# Patient Record
Sex: FEMALE | Race: WHITE | NOT HISPANIC OR LATINO | Employment: UNEMPLOYED | URBAN - METROPOLITAN AREA
[De-identification: names, ages, dates, MRNs, and addresses within clinical notes are randomized per-mention and may not be internally consistent; named-entity substitution may affect disease eponyms.]

---

## 2019-02-04 ENCOUNTER — OFFICE VISIT (OUTPATIENT)
Dept: OBGYN | Facility: CLINIC | Age: 39
End: 2019-02-04
Payer: COMMERCIAL

## 2019-02-04 ENCOUNTER — ANCILLARY PROCEDURE (OUTPATIENT)
Dept: MAMMOGRAPHY | Facility: CLINIC | Age: 39
End: 2019-02-04
Payer: COMMERCIAL

## 2019-02-04 VITALS
WEIGHT: 293 LBS | BODY MASS INDEX: 47.09 KG/M2 | HEART RATE: 72 BPM | SYSTOLIC BLOOD PRESSURE: 112 MMHG | HEIGHT: 66 IN | DIASTOLIC BLOOD PRESSURE: 64 MMHG

## 2019-02-04 DIAGNOSIS — E66.01 MORBID OBESITY WITH BMI OF 50.0-59.9, ADULT (H): ICD-10-CM

## 2019-02-04 DIAGNOSIS — E03.9 ACQUIRED HYPOTHYROIDISM: ICD-10-CM

## 2019-02-04 DIAGNOSIS — Z01.419 ENCOUNTER FOR GYNECOLOGICAL EXAMINATION WITHOUT ABNORMAL FINDING: Primary | ICD-10-CM

## 2019-02-04 DIAGNOSIS — Z12.31 VISIT FOR SCREENING MAMMOGRAM: ICD-10-CM

## 2019-02-04 DIAGNOSIS — Z30.09 GENERAL COUNSELING AND ADVICE ON CONTRACEPTIVE MANAGEMENT: ICD-10-CM

## 2019-02-04 PROCEDURE — 87624 HPV HI-RISK TYP POOLED RSLT: CPT | Performed by: OBSTETRICS & GYNECOLOGY

## 2019-02-04 PROCEDURE — 77067 SCR MAMMO BI INCL CAD: CPT | Mod: TC

## 2019-02-04 PROCEDURE — G0145 SCR C/V CYTO,THINLAYER,RESCR: HCPCS | Performed by: OBSTETRICS & GYNECOLOGY

## 2019-02-04 PROCEDURE — 99213 OFFICE O/P EST LOW 20 MIN: CPT | Mod: 25 | Performed by: OBSTETRICS & GYNECOLOGY

## 2019-02-04 PROCEDURE — 99396 PREV VISIT EST AGE 40-64: CPT | Performed by: OBSTETRICS & GYNECOLOGY

## 2019-02-04 RX ORDER — PHENTERMINE HYDROCHLORIDE 30 MG/1
30 CAPSULE ORAL EVERY MORNING
Qty: 30 CAPSULE | Refills: 1 | Status: SHIPPED | OUTPATIENT
Start: 2019-02-04 | End: 2019-03-04

## 2019-02-04 ASSESSMENT — ANXIETY QUESTIONNAIRES
5. BEING SO RESTLESS THAT IT IS HARD TO SIT STILL: NOT AT ALL
1. FEELING NERVOUS, ANXIOUS, OR ON EDGE: NOT AT ALL
7. FEELING AFRAID AS IF SOMETHING AWFUL MIGHT HAPPEN: NOT AT ALL
6. BECOMING EASILY ANNOYED OR IRRITABLE: NOT AT ALL
IF YOU CHECKED OFF ANY PROBLEMS ON THIS QUESTIONNAIRE, HOW DIFFICULT HAVE THESE PROBLEMS MADE IT FOR YOU TO DO YOUR WORK, TAKE CARE OF THINGS AT HOME, OR GET ALONG WITH OTHER PEOPLE: NOT DIFFICULT AT ALL
2. NOT BEING ABLE TO STOP OR CONTROL WORRYING: NOT AT ALL
GAD7 TOTAL SCORE: 0
3. WORRYING TOO MUCH ABOUT DIFFERENT THINGS: NOT AT ALL

## 2019-02-04 ASSESSMENT — PATIENT HEALTH QUESTIONNAIRE - PHQ9
SUM OF ALL RESPONSES TO PHQ QUESTIONS 1-9: 2
5. POOR APPETITE OR OVEREATING: NOT AT ALL

## 2019-02-04 ASSESSMENT — MIFFLIN-ST. JEOR: SCORE: 2298.15

## 2019-02-04 NOTE — PROGRESS NOTES
Yodit is a 38 year old  female who presents for annual exam.     Besides routine health maintenance,  she would like to discuss weight loss management and nipple sensitivity. Also interested in learning different BC options.     HPI:  The patient's PCP is [facility].    Patient hasn't been seen for about 3 yrs. Does have a PCP and has seen them and had labs done about 1-2 yrs ago that were normal. Continues to follow her thyroid through endo and that has been stable.    Patient hasn't contracepted other than condoms since having her kids. Would really like to finally make a decision on contraception and do that as would be horrified to get pregnant at her age. Patient thought her mom had cervical cancer(in notes states ovarian) but now has found out from her sisters that it was likely uterine. She  at age 48 and so Yodit was very young and didn't know the details. Patient's mom was definitely overweight from what she recalls but doesn't think it was that bad. Unsure of any other details though. Wonders about what options she would use that would either improve risk or could actually worsen it. Worries about ocps given her weight and risk of blood clots    Patient's weight has also gotten out of hand. She is actually down about 15# from last weight at a [facility] but BMI is 58. We've discussed bariatric surgery on many occasions but she feels that that isn't for her and really doesn't want to do that. States she doesn't eat much. Not a snacker, doesn't crave sweets. Definitely craves more salty snacks and bread/pasta/potatoes. Was doing a lot of soda but has actually cut that out.  is on phentermine since sept and has lost 120#. Working out all the time, cut out soda, not eating at gas stations on way home from work, etc. Already making healthier food choices b/c of his attempts. Wondering if maybe she could also try phentermine.      GYNECOLOGIC HISTORY:    Patient's last  menstrual period was 2019 (exact date).  Her current contraception method is: condoms.  She  reports that  has never smoked. she has never used smokeless tobacco.    Patient is sexually active.  STD testing offered?  Declined  Last PHQ-9 score on record =   PHQ-9 SCORE 2019   PHQ-9 Total Score 2     Last GAD7 score on record =   MELINA-7 SCORE 2019   Total Score 0     Alcohol Score = 1    HEALTH MAINTENANCE:  Cholesterol:  17   Total= 183, Triglycerides=122, HDL=45, CFL=273, TSH=2.97  Last Mammo: Never, Result: not applicable  Pap: 12 wnl, HPV-  Colonoscopy:  Never, Result: not applicable  Dexa:  Never    Health maintenance updated:  yes    HISTORY:  Obstetric History       T2      L2     SAB1   TAB0   Ectopic0   Multiple0   Live Births2       # Outcome Date GA Lbr Misael/2nd Weight Sex Delivery Anes PTL Lv   3 Term 11 39w0d  4.423 kg (9 lb 12 oz) F CS-Unspec EPI  PAULY      Name: Ebonie   2 Term 12/10/08 39w0d  3.6 kg (7 lb 15 oz) M CS-Unspec Spinal  PAULY      Name: Flaquito   1 SAB 06     SAB             Patient Active Problem List   Diagnosis     Menorrhagia with regular cycle     Morbid obesity, unspecified obesity type (H)     Family history of ovarian cancer     Hypothyroidism     Morbid obesity with BMI of 50.0-59.9, adult (H)     Past Surgical History:   Procedure Laterality Date      SECTION  12/10/08, 11     HC CREATE EARDRUM OPENING,GEN ANESTH       HC TOOTH EXTRACTION W/FORCEP        Social History     Tobacco Use     Smoking status: Never Smoker     Smokeless tobacco: Never Used   Substance Use Topics     Alcohol use: Yes     Alcohol/week: 0.0 oz      Problem (# of Occurrences) Relation (Name,Age of Onset)    Breast Cancer (1) Maternal Grandmother: 2013    Thyroid Disease (1) Sister    Uterine Cancer (1) Mother (48):  age 48            Current Outpatient Medications   Medication Sig     levothyroxine (SYNTHROID, LEVOTHROID) 125 MCG tablet  "Take 2 tablets (250 mcg) by mouth daily     phentermine (ADIPEX-P) 30 MG capsule Take 1 capsule (30 mg) by mouth every morning     No current facility-administered medications for this visit.      No Known Allergies    Past medical, surgical, social and family histories were reviewed and updated in EPIC.    ROS:   12 point review of systems negative other than symptoms noted below.  Breast: Tenderness  Genitourinary: Vaginal Discharge  Skin: Acne and Skin Dryness    EXAM:  /64   Pulse 72   Ht 1.664 m (5' 5.5\")   Wt (!) 161.9 kg (357 lb)   LMP 01/17/2019 (Exact Date)   BMI 58.50 kg/m     BMI: Body mass index is 58.5 kg/m .    PHYSICAL EXAM:  Constitutional:  Appearance: Well nourished, well developed, alert, in no acute distress  Neck:  Lymph Nodes:  No lymphadenopathy present    Thyroid:  Gland size normal, nontender, no nodules or masses present  on palpation  Chest:  Respiratory Effort:  Breathing unlabored  Cardiovascular:    Heart: Auscultation:  Regular rate, normal rhythm, no murmurs present  Breasts: Palpation of Breasts and Axillae:  No masses present on palpation, no breast tenderness. and No nodularity, asymmetry or nipple discharge bilaterally.  Gastrointestinal:   Abdominal Examination:  Abdomen nontender to palpation, tone normal without rigidity or guarding, no masses present, umbilicus without lesions   Liver and Spleen:  No hepatomegaly present, liver nontender to palpation    Hernias:  No hernias present  Lymphatic: Lymph Nodes:  No other lymphadenopathy present  Skin:  General Inspection:  No rashes present, no lesions present, no areas of  discoloration    Genitalia and Groin:  No rashes present, no lesions present, no areas of  discoloration, no masses present  Neurologic/Psychiatric:    Mental Status:  Oriented X3     Pelvic Exam:  External Genitalia:     Normal appearance for age, no discharge present, no tenderness present, no inflammatory lesions present, color normal  Vagina:  "    Normal vaginal vault without central or paravaginal defects, no discharge present, no inflammatory lesions present, no masses present  Bladder:     Nontender to palpation  Urethra:   Urethral Body:  Urethra palpation normal, urethra structural support normal   Urethral Meatus:  No erythema or lesions present  Cervix:     Appearance healthy, no lesions present, nontender to palpation, no bleeding present  Uterus:     Uterus: firm, normal sized and nontender, midplane in position.   Adnexa:     No adnexal tenderness present, no adnexal masses present  Perineum:     Perineum within normal limits, no evidence of trauma, no rashes or skin lesions present  Anus:     Anus within normal limits, no hemorrhoids present  Inguinal Lymph Nodes:     No lymphadenopathy present  Pubic Hair:     Normal pubic hair distribution for age  Genitalia and Groin:     No rashes present, no lesions present, no areas of discoloration, no masses present      COUNSELING:   Reviewed preventive health counseling, as reflected in patient instructions  Special attention given to:        Regular exercise       Healthy diet/nutrition       Contraception    BMI: Body mass index is 58.5 kg/m .  Weight management plan: Specific weight management program called bariatric surgery discussed but patient declines that. See below for alternative therapy plan    ASSESSMENT:  38 year old female with satisfactory annual exam.    ICD-10-CM    1. Encounter for gynecological examination without abnormal finding Z01.419 Pap imaged thin layer screen with HPV - recommended age 30 - 65     HPV High Risk Types DNA Cervical   2. Morbid obesity with BMI of 50.0-59.9, adult (H) E66.01 phentermine (ADIPEX-P) 30 MG capsule    Z68.43    3. General counseling and advice on contraceptive management Z30.09    4. Acquired hypothyroidism E03.9        PLAN:  Pap and cotesting done today  mammo today  Continue to follow thyroid with endo and her labs with PCP    Discussed  contraceptive options. ocps were a better choice if her mom had ovarian cancer but given her weight and sedentary lifestyle she is high risk for DVT on that. Could consider N.R but still some risk. Discussed that a mirena IUd could be a great option to decrease her flow, decrease risk of uterine cancer, and be a long term option that doesn't increase DVT risk. Patient does think she'd like to do that. Had both kids by C/S and cvx is fairly deep set so may need a long speculum but were able to see her cvx for pap today with a lot of pressure on the speculum.    Also spent a lot of time discussing her weight. Discussed her fears of bariatric surgery as legitimate but the risk to her health and life from her morbid obesity is much worse. Her risk of just uterine cancer like her mom is much higher give her BMI.  Patient isn't interested in pursuing surgery at this time though  Discussed phentermine, it's indications, and usage. Discussed its r/b/a/side effects.  Cautioned not to expect the same level of weight loss as her  as male and female weight loss is very different  Discussed that this can possibly be done long term in a lower dose it is technically meant for short term use so will start with planning about 6 months and reassess from there.  Will start on it and f/u with me in a month regarding weight loss, BP check and also do her IUD insert at same time.  Will have her do initially monthly f/u with me on the med and then could consider longer 2-3 month intervals.    Spent an additional 15 min on top of her annual addressing the morbid obesity, implications, and treatment options.    Lexi Salazar MD

## 2019-02-05 ASSESSMENT — ANXIETY QUESTIONNAIRES: GAD7 TOTAL SCORE: 0

## 2019-02-07 PROBLEM — E66.01 MORBID OBESITY WITH BMI OF 50.0-59.9, ADULT (H): Status: ACTIVE | Noted: 2019-02-07

## 2019-02-07 LAB
COPATH REPORT: NORMAL
PAP: NORMAL

## 2019-02-08 LAB
FINAL DIAGNOSIS: NORMAL
HPV HR 12 DNA CVX QL NAA+PROBE: NEGATIVE
HPV16 DNA SPEC QL NAA+PROBE: NEGATIVE
HPV18 DNA SPEC QL NAA+PROBE: NEGATIVE
SPECIMEN DESCRIPTION: NORMAL
SPECIMEN SOURCE CVX/VAG CYTO: NORMAL

## 2019-02-18 ENCOUNTER — OFFICE VISIT (OUTPATIENT)
Dept: OBGYN | Facility: CLINIC | Age: 39
End: 2019-02-18
Payer: COMMERCIAL

## 2019-02-18 ENCOUNTER — TRANSFERRED RECORDS (OUTPATIENT)
Dept: HEALTH INFORMATION MANAGEMENT | Facility: CLINIC | Age: 39
End: 2019-02-18

## 2019-02-18 VITALS
DIASTOLIC BLOOD PRESSURE: 68 MMHG | SYSTOLIC BLOOD PRESSURE: 118 MMHG | BODY MASS INDEX: 47.09 KG/M2 | HEIGHT: 66 IN | HEART RATE: 64 BPM | WEIGHT: 293 LBS

## 2019-02-18 DIAGNOSIS — Z30.431 IUD CHECK UP: ICD-10-CM

## 2019-02-18 DIAGNOSIS — Z97.5 PRESENCE OF INTRAUTERINE CONTRACEPTIVE DEVICE (IUD): ICD-10-CM

## 2019-02-18 DIAGNOSIS — Z30.430 ENCOUNTER FOR INSERTION OF INTRAUTERINE CONTRACEPTIVE DEVICE: Primary | ICD-10-CM

## 2019-02-18 LAB — BETA HCG QUAL IFA URINE: NEGATIVE

## 2019-02-18 PROCEDURE — 58300 INSERT INTRAUTERINE DEVICE: CPT | Performed by: OBSTETRICS & GYNECOLOGY

## 2019-02-18 PROCEDURE — 84703 CHORIONIC GONADOTROPIN ASSAY: CPT | Performed by: OBSTETRICS & GYNECOLOGY

## 2019-02-18 ASSESSMENT — MIFFLIN-ST. JEOR: SCORE: 2275.47

## 2019-02-18 NOTE — PROGRESS NOTES
"  IUD Insertion:  CONSULT:    Is a pregnancy test required: Yes.  Was it positive or negative?  Negative  Was a consent obtained?  Yes    Subjective: Yodit Harvey is a 38 year old  presents for IUD and desires Mirena type IUD.    Patient has been given the opportunity to ask questions about all forms of birth control, including all options appropriate for Yodit Harvey. Discussed that no method of birth control, except abstinence is 100% effective against pregnancy or sexually transmitted infection.     Yodit Harvey understands she may have the IUD removed at any time. IUD should be removed by a health care provider.    The entire insertion procedure was reviewed with the patient, including care after placement.    Patient's last menstrual period was 2019 (exact date). Has current contraception: condoms. No allergy to betadine or shellfish. Patient declines STD screening      /68   Pulse 64   Ht 1.664 m (5' 5.5\")   Wt (!) 159.7 kg (352 lb)   LMP 2019 (Exact Date)   BMI 57.68 kg/m      Pelvic Exam:   EG/BUS: normal genital architecture without lesions, erythema or abnormal secretions.   Vagina: moist, pink, rugae with physiologic discharge and secretions  Cervix: nulliparous appearing no lesions and pink, moist, closed, without lesion or CMT, very deep set so need the extra long graves but the graves was somewhat tight for the introitus  Uterus: midposition, mobile, no pain  Adnexa: within normal limits and no masses, nodularity, tenderness    PROCEDURE NOTE: -- IUD Insertion    Reason for Insertion: contraception    Patient didn't premedicate with ibuprofen. Says she will take some when she gets home.  Under sterile technique, cervix was visualized with speculum and prepped with Betadine solution swab x 3. Tenaculum was placed for stability. The uterus was gently straightened and attempt at sounding was made. The cervix allowed the sound to pass but b/c the speculum was " large for her introitus it was hard to direct the sound with the tenaculum there b/c of tight fit. Finally able to easily pass os finder clearly into the endometrial cavity. Tried to follow with sound but felt like possibly an initial false passage was being made. The os finder then again easily passed and the uterus sounded to 7.5 cm. IUD prepared for placement, and IUD inserted according to 's instructions without difficulty or significant resitance, and deployed at the fundus. The strings were visualized and trimmed to 3.0 cm from the external os. Tenaculum was removed and hemostasis noted. Speculum removed.  Patient tolerated procedure well. B/C there was some difficulty with the sound and wanted to confirm that the IUD was actually placed appropriately and centrally, patient was actually sent to [facility] for U/S to confirm. On U/S the IUD was seen to be appropriately positioned at the fundus.    Lot # [id number]  Exp: 06/2021  NDC: 39065-680-07    EBL: minimal    Complications: none    ASSESSMENT:     ICD-10-CM    1. Encounter for insertion of intrauterine contraceptive device Z30.430 levonorgestrel (MIRENA) 20 MCG/24HR IUD 20 mcg     INSERTION INTRAUTERINE DEVICE     Beta HCG Qual, Urine - [facility] and [place] ([id number])        PLAN:    Given 's handouts, including when to have IUD removed, list of danger s/sx, side effects and follow up recommended. Encouraged condom use for prevention of STD. Back up contraception advised for 7 days if progestin method. Advised to call for any fever, for prolonged or severe pain or bleeding, abnormal vaginal discharge, or unable to palpate strings. She was advised to use pain medications (ibuprofen) as needed for mild to moderate pain. Advised to follow-up in clinic in 4-6 weeks for IUD string check if unable to find strings or as directed by provider.     Patient has lost 5# since here 2 weeks ago and BP is great on phentermine. Has had a couple dizzy spells  with it but for the most part is tolerating it fine. Has a f/u in another 2-3 weeks for just that and can f/u on her IUD at that time also.    Lexi Salazar MD

## 2019-02-19 PROBLEM — Z97.5 PRESENCE OF INTRAUTERINE CONTRACEPTIVE DEVICE (IUD): Status: ACTIVE | Noted: 2019-02-18

## 2019-03-04 ENCOUNTER — OFFICE VISIT (OUTPATIENT)
Dept: OBGYN | Facility: CLINIC | Age: 39
End: 2019-03-04
Payer: COMMERCIAL

## 2019-03-04 VITALS
SYSTOLIC BLOOD PRESSURE: 112 MMHG | BODY MASS INDEX: 47.09 KG/M2 | HEART RATE: 70 BPM | DIASTOLIC BLOOD PRESSURE: 62 MMHG | HEIGHT: 66 IN | WEIGHT: 293 LBS

## 2019-03-04 DIAGNOSIS — E66.01 MORBID OBESITY WITH BMI OF 50.0-59.9, ADULT (H): Primary | ICD-10-CM

## 2019-03-04 PROCEDURE — 99212 OFFICE O/P EST SF 10 MIN: CPT | Performed by: OBSTETRICS & GYNECOLOGY

## 2019-03-04 RX ORDER — PHENTERMINE HYDROCHLORIDE 30 MG/1
30 CAPSULE ORAL EVERY MORNING
Qty: 30 CAPSULE | Refills: 0 | Status: SHIPPED | OUTPATIENT
Start: 2019-03-04 | End: 2019-04-10

## 2019-03-04 ASSESSMENT — MIFFLIN-ST. JEOR: SCORE: 2252.79

## 2019-03-04 NOTE — PROGRESS NOTES
"    SUBJECTIVE:                                                   Yodit Harvey is a 38 year old female who presents to clinic today for the following health issue(s):  Patient presents with:  Recheck Medication: f/u to phentermine- states she wishes she could see better results but \"better than nothing\"       HPI:  Patient has now been on her phentermine for one month and has lost 10#. Patient is disappointed. Had hoped to lose more. Patient's  lost 25# the first month of starting it. Reminded her again that she is not to expect to have the same results as her  and how men and women differ and that 10# in one seng is great progress    Patient is going to the gym 2-3 times a week and play volleyball one additional day on top of that. Anmol is going 6 days a week to the gym.    Is eating smaller portions, is trying to make better food choices like not going to get fast food, not snacking in the evenings as much. Has cut out all soda.    Did mention that if they go to subway Anmol is getting a lettuce wrap and \"i'm not as hard core, I'll still get a sub with bread\" but trying to make more adjustment    Was dizzy the first few days on the med but now isn't and doesn't have any other side effects.    Patient's last menstrual period was 2019 (exact date)..   Patient is sexually active, .  Using IUD and condoms for contraception.    reports that  has never smoked. she has never used smokeless tobacco.    STD testing offered?  Declined    Health maintenance updated:  yes    Problem list and histories reviewed & adjusted, as indicated.  Additional history: as documented.    Patient Active Problem List   Diagnosis     Menorrhagia with regular cycle     Morbid obesity, unspecified obesity type (H)     Family history of ovarian cancer     Hypothyroidism     Morbid obesity with BMI of 50.0-59.9, adult (H)     Presence of intrauterine contraceptive device (IUD)     Past Surgical History:   Procedure " "Laterality Date      SECTION  12/10/08, 11     HC CREATE EARDRUM OPENING,GEN ANESTH       HC TOOTH EXTRACTION W/FORCEP        Social History     Tobacco Use     Smoking status: Never Smoker     Smokeless tobacco: Never Used   Substance Use Topics     Alcohol use: Yes     Alcohol/week: 0.0 oz      Problem (# of Occurrences) Relation (Name,Age of Onset)    Breast Cancer (1) Maternal Grandmother: 2013    Thyroid Disease (1) Sister    Uterine Cancer (1) Mother (48):  age 48            Current Outpatient Medications   Medication Sig     levothyroxine (SYNTHROID, LEVOTHROID) 125 MCG tablet Take 2 tablets (250 mcg) by mouth daily     phentermine (ADIPEX-P) 30 MG capsule Take 1 capsule (30 mg) by mouth every morning     No current facility-administered medications for this visit.      No Known Allergies    ROS:  12 point review of systems negative other than symptoms noted below.    OBJECTIVE:     /62   Pulse 70   Ht 1.664 m (5' 5.5\")   Wt (!) 157.4 kg (347 lb)   LMP 2019 (Exact Date)   BMI 56.87 kg/m    Body mass index is 56.87 kg/m .    Exam:  Constitutional:  Appearance: Well nourished, well developed alert, in no acute distress  Neck:  Lymph Nodes:  No lymphadenopathy present; Thyroid:  Gland size normal, nontender, no nodules or masses present on palpation  Chest:  Respiratory Effort:  Breathing unlabored, CTA bilaterall  Cardiovascular: Heart: Auscultation:  Regular rate, normal rhythm, no murmurs present     In-Clinic Test Results:  No results found for this or any previous visit (from the past 24 hour(s)).    ASSESSMENT/PLAN:                                                        ICD-10-CM    1. Morbid obesity with BMI of 50.0-59.9, adult (H) E66.01 phentermine (ADIPEX-P) 30 MG capsule    Z68.43          Patient has lost 10# this month and is feeling good on the medication w/o side effects  Discussed other things she can do in her day to day routine to not only stimulate more " weight loss but also to just simply have lifestyle changes in place after the medicine is gone.  Strongly encouraged to avoid big bread consumption like at a subway and generally carbs  Recommend taht for weight loss she really needs to be doing at least 30 min of intense cardio to HR of at least 150+ to get weight loss burning 5x/week. Volleyball is active but it is not getting her HR high enough in a sustained fashion  Will give one additional month refill and she will f/u with me in 6 weeks or so for a check in.    Lexi Salazar MD  [facility]

## 2019-04-10 ENCOUNTER — OFFICE VISIT (OUTPATIENT)
Dept: OBGYN | Facility: CLINIC | Age: 39
End: 2019-04-10
Payer: COMMERCIAL

## 2019-04-10 DIAGNOSIS — R10.2 VAGINAL PAIN: ICD-10-CM

## 2019-04-10 DIAGNOSIS — E66.01 MORBID OBESITY WITH BMI OF 50.0-59.9, ADULT (H): Primary | ICD-10-CM

## 2019-04-10 PROCEDURE — 99214 OFFICE O/P EST MOD 30 MIN: CPT | Performed by: OBSTETRICS & GYNECOLOGY

## 2019-04-10 RX ORDER — PHENTERMINE HYDROCHLORIDE 30 MG/1
30 CAPSULE ORAL EVERY MORNING
Qty: 30 CAPSULE | Refills: 1 | Status: SHIPPED | OUTPATIENT
Start: 2019-04-10 | End: 2024-01-27

## 2019-04-10 ASSESSMENT — ANXIETY QUESTIONNAIRES
1. FEELING NERVOUS, ANXIOUS, OR ON EDGE: SEVERAL DAYS
5. BEING SO RESTLESS THAT IT IS HARD TO SIT STILL: NOT AT ALL
6. BECOMING EASILY ANNOYED OR IRRITABLE: NOT AT ALL
3. WORRYING TOO MUCH ABOUT DIFFERENT THINGS: NOT AT ALL
GAD7 TOTAL SCORE: 1
IF YOU CHECKED OFF ANY PROBLEMS ON THIS QUESTIONNAIRE, HOW DIFFICULT HAVE THESE PROBLEMS MADE IT FOR YOU TO DO YOUR WORK, TAKE CARE OF THINGS AT HOME, OR GET ALONG WITH OTHER PEOPLE: NOT DIFFICULT AT ALL
2. NOT BEING ABLE TO STOP OR CONTROL WORRYING: NOT AT ALL
7. FEELING AFRAID AS IF SOMETHING AWFUL MIGHT HAPPEN: NOT AT ALL

## 2019-04-10 ASSESSMENT — PATIENT HEALTH QUESTIONNAIRE - PHQ9
SUM OF ALL RESPONSES TO PHQ QUESTIONS 1-9: 0
5. POOR APPETITE OR OVEREATING: NOT AT ALL

## 2019-04-10 ASSESSMENT — MIFFLIN-ST. JEOR: SCORE: 2212.88

## 2019-04-10 NOTE — PROGRESS NOTES
SUBJECTIVE:                                                   Yodit Harvey is a 38 year old female who presents to clinic today for the following health issue(s):  Patient presents with:  Recheck Medication      HPI:  Patient is now just finishing her 2nd month of phentermine. She is down 19# total in that period of time and 9# this last month.  Is now at the gym 4-5 times a week and was there only 3x/week last month.  She is now definitely trying to eat more like her  in terms of food choices since he is also on phentermine and really focused on healthy eating.  Her portions are of course smaller than his but trying to order healthier if go out to eat and avoiding pasta when prepping at home.  She is still on occasion using food as a reward so will go out to eat and choose a greasy burger or they'll order pizza. She is just eating much less of it and full fast but certainly could choose something better.    Never eats breakfast and never has. Gets so busy at work that now sometimes will even occasionally skip lunch. Then gets home and is more hungry. Did this a couple days ago but ate a bowl of yogurt while prepping dinner and normally would have grabbed a bag of chips. So cravings are less and easier to control.    Still really disappointed that not going faster. Her personal goal would be 15#/month. Doesn't weigh herself at home.  weighs himself everyday and gets depressed if doesn't lose a pound or gains a pound yet he's always trying to commend her on how good she's doing and doesn't feel the same about themselves as they do about the other. However great support to one another as well.    Trying to drink enough water but certainly isn't drinking as much as she can.  In the last month or so has noticed that she'll have a day of going to the bathroom and feeling a pain or discomfort when the urine is passing. Doesn't hurt like a typical UTI, though has never had one personally, but just  aware of the sensation. Then it's gone. Can last a whole day or just one trip to the bathroom and then be gone for days. Also is aware that sometimes when she's sitting relaxed there's like a fullness in her vagina. Not painful, not a bulge but almost like its full or swollen. Then if she sits really upright with good posture or leans forward or back it goes away. No itching, burning, vaginal discharge. No other UTI type sx.     Patient's last menstrual period was 2019..   Patient is sexually active, .  Using IUD for contraception.    reports that she has never smoked. She has never used smokeless tobacco.    STD testing offered?  Declined    Health maintenance updated:  yes    Today's PHQ-2 Score: No flowsheet data found.  Today's PHQ-9 Score:   PHQ-9 SCORE 4/10/2019   PHQ-9 Total Score 0     Today's MELINA-7 Score:   MELINA-7 SCORE 4/10/2019   Total Score 1       Problem list and histories reviewed & adjusted, as indicated.  Additional history: as documented.    Patient Active Problem List   Diagnosis     Menorrhagia with regular cycle     Morbid obesity, unspecified obesity type (H)     Family history of ovarian cancer     Hypothyroidism     Morbid obesity with BMI of 50.0-59.9, adult (H)     Presence of intrauterine contraceptive device (IUD)     Past Surgical History:   Procedure Laterality Date      SECTION  12/10/08, 11     HC CREATE EARDRUM OPENING,GEN ANESTH       HC TOOTH EXTRACTION W/FORCEP        Social History     Tobacco Use     Smoking status: Never Smoker     Smokeless tobacco: Never Used   Substance Use Topics     Alcohol use: Yes     Alcohol/week: 0.0 oz      Problem (# of Occurrences) Relation (Name,Age of Onset)    Breast Cancer (1) Maternal Grandmother: 2013    Thyroid Disease (1) Sister    Uterine Cancer (1) Mother (48):  age 48            Current Outpatient Medications   Medication Sig     levothyroxine (SYNTHROID, LEVOTHROID) 125 MCG tablet Take 2 tablets (250  "mcg) by mouth daily     phentermine (ADIPEX-P) 30 MG capsule Take 1 capsule (30 mg) by mouth every morning     No current facility-administered medications for this visit.      No Known Allergies    ROS:  12 point review of systems negative other than symptoms noted below.    OBJECTIVE:     /61 (BP Location: Right arm, Patient Position: Sitting, Cuff Size: Adult Large)   Pulse 67   Ht 1.664 m (5' 5.5\")   Wt (!) 153.4 kg (338 lb 3.2 oz)   LMP 04/08/2019   Breastfeeding? No   BMI 55.42 kg/m    Body mass index is 55.42 kg/m .    Exam:  Constitutional:  Appearance: Well nourished, well developed alert, in no acute distress  Chest:  Respiratory Effort:  Breathing unlabored, CTA bilaterally  Cardiovascular: Heart: Auscultation:  Regular rate, normal rhythm, no murmurs present     In-Clinic Test Results:  No results found for this or any previous visit (from the past 24 hour(s)).    ASSESSMENT/PLAN:                                                        ICD-10-CM    1. Morbid obesity with BMI of 50.0-59.9, adult (H) E66.01 phentermine (ADIPEX-P) 30 MG capsule    Z68.43    2. Vaginal pain R10.2          Patient is doing very well from a weight loss standpoint and is averaging about 10# a month. Reassured that this is great and that she is setting unrealistic goals for herself at 15#/month.  Discussed how many less calories in a week/month she'd have to consume vs expend to get to 15# a month and she is realizing somewhat that that is really excessive to expect.  Discussed a few different small tweaks she could make in terms of water and a small breakfast of only protein that could jump start her weight loss if she wants to set 15#/month as a goal but don't want her to be discouraged by unrealistic goals either.  Also cautioned that the occasional burger or pizza is to be expected but that overall when phentermine isn't on board she will not consume small portions so to make those food choices very rarely and " learn to change what she orders at restaurants and what she grabs to eat/snack on when she's really hungry, etc.    As far as her vaginal complaints it seems almost more like her weight loss may be causing some increased vaginal laxity, in addition to having had NSVDs in past. She may be having some slight worsening In prolapse b/c the tissue is now looser from her weight loss. Though she's only lost 20# these last 2 months she was at 276# as a high one year ago so really is down 40#.  Not having the sx today and is actually starting her period so a UA will be nonuseful.   Will future out a UA/Ucx order and she can do a quick specimen drop off lab appointment on a day she is having the sx   Does not sound like it is related to her IUD but that has been in for only about 6 weeks or so now so will see how she's doing in another month or so. Can do a vaginal exam next time if sx are persisting and patient is agreeable.    Given 1 month and 1 refill on phentermine. Will f/u with me again in 6 weeks.    Spent 30 min with patient >50% of which was in face to face counseling time    Lexi Salazar MD  [facility] [place]

## 2019-04-11 VITALS
HEART RATE: 67 BPM | WEIGHT: 293 LBS | HEIGHT: 66 IN | DIASTOLIC BLOOD PRESSURE: 61 MMHG | BODY MASS INDEX: 47.09 KG/M2 | SYSTOLIC BLOOD PRESSURE: 110 MMHG

## 2019-04-11 ASSESSMENT — ANXIETY QUESTIONNAIRES: GAD7 TOTAL SCORE: 1

## 2019-05-13 ENCOUNTER — MYC MEDICAL ADVICE (OUTPATIENT)
Dept: OBGYN | Facility: CLINIC | Age: 39
End: 2019-05-13

## 2019-05-13 DIAGNOSIS — R10.2 VAGINAL PAIN: ICD-10-CM

## 2019-05-13 DIAGNOSIS — N39.0 ACUTE UTI: Primary | ICD-10-CM

## 2019-05-13 LAB
ALBUMIN UR-MCNC: NEGATIVE MG/DL
APPEARANCE UR: CLEAR
BACTERIA #/AREA URNS HPF: ABNORMAL /HPF
BILIRUB UR QL STRIP: NEGATIVE
COLOR UR AUTO: YELLOW
GLUCOSE UR STRIP-MCNC: NEGATIVE MG/DL
HGB UR QL STRIP: ABNORMAL
KETONES UR STRIP-MCNC: NEGATIVE MG/DL
LEUKOCYTE ESTERASE UR QL STRIP: ABNORMAL
NITRATE UR QL: NEGATIVE
PH UR STRIP: 5.5 PH (ref 5–7)
RBC #/AREA URNS AUTO: ABNORMAL /HPF
SOURCE: ABNORMAL
SP GR UR STRIP: 1.01 (ref 1–1.03)
UROBILINOGEN UR STRIP-ACNC: 0.2 EU/DL (ref 0.2–1)
WBC #/AREA URNS AUTO: ABNORMAL /HPF

## 2019-05-13 PROCEDURE — 81001 URINALYSIS AUTO W/SCOPE: CPT | Performed by: OBSTETRICS & GYNECOLOGY

## 2019-05-13 PROCEDURE — 87086 URINE CULTURE/COLONY COUNT: CPT | Performed by: OBSTETRICS & GYNECOLOGY

## 2019-05-13 PROCEDURE — 87186 SC STD MICRODIL/AGAR DIL: CPT | Performed by: OBSTETRICS & GYNECOLOGY

## 2019-05-13 PROCEDURE — 87088 URINE BACTERIA CULTURE: CPT | Performed by: OBSTETRICS & GYNECOLOGY

## 2019-05-13 RX ORDER — SULFAMETHOXAZOLE AND TRIMETHOPRIM 400; 80 MG/1; MG/1
1 TABLET ORAL 2 TIMES DAILY
Qty: 14 TABLET | Refills: 0 | Status: SHIPPED | OUTPATIENT
Start: 2019-05-13 | End: 2019-06-14

## 2019-05-13 NOTE — TELEPHONE ENCOUNTER
Pt informed of UA results. Informed that RX ahs been sent. Pt is complaining of pain with urination and BM's. Informed can try warm tub soaks. Could also try AZO to help with burning. Pt has appt with Dr. Salazar  on wed.

## 2019-05-13 NOTE — PROGRESS NOTES
"    SUBJECTIVE:                                                   Yodit Harvey is a 38 year old female who presents to clinic today for the following health issue(s):  Patient presents with:  Follow Up: medication - Phentermine, previous UTI - sx not going away      HPI:  Patient was originally scheduled for a phentermine f/u today as now just finishing month 3.  However she subsequentally developed severe UTI sx last week Friday so left a UA/Ucx and did have an e. Coli UTI. Only 50-100k colony counts but it was resistance to cipro/levo/ampicillin. Was already on bactrim DS for a 7 day course but started only 2 days ago. The severe dysuria is gone but still having horrible pain. Can hardyl sit. Tried azo for a couple days but didn't seem to be helping  Can hardly wear clothes. Having a BM is enough to put her into tears.   Had to leave work yesterday b/c it was so bad, took off her bottoms and just laid in bed with her legs spread b/c so painful   looked and told her \"you just have a lot of pimples down here\"  Completely miserable and doesn't understand why the UTI is so bad and not getting better on an appropriate antibiotic.    In terms of phentermine she is shocked that she lost 10# b/c hasn't been to the gym in almost 10 days b/c of her discomfort and the UTI sx prior. [place] and definitly ate a lot more and much more unhealthy food choices than has been typical for her since starting on meds. Is actually thrilled that still managed to lose 10#    No LMP recorded. (Menstrual status: IUD)..   Patient is sexually active, .  Using IUD and condoms for contraception.    reports that she has never smoked. She has never used smokeless tobacco.    STD testing offered?  Declined    Health maintenance updated:  yes    Today's PHQ-2 Score: No flowsheet data found.  Today's PHQ-9 Score:   PHQ-9 SCORE 4/10/2019   PHQ-9 Total Score 0     Today's MELINA-7 Score:   MELINA-7 SCORE 4/10/2019   Total " "Score 1       Problem list and histories reviewed & adjusted, as indicated.  Additional history: as documented.    Patient Active Problem List   Diagnosis     Menorrhagia with regular cycle     Morbid obesity, unspecified obesity type (H)     Family history of ovarian cancer     Hypothyroidism     Morbid obesity with BMI of 50.0-59.9, adult (H)     Presence of intrauterine contraceptive device (IUD)     Past Surgical History:   Procedure Laterality Date      SECTION  12/10/08, 11     HC CREATE EARDRUM OPENING,GEN ANESTH       HC TOOTH EXTRACTION W/FORCEP        Social History     Tobacco Use     Smoking status: Never Smoker     Smokeless tobacco: Never Used   Substance Use Topics     Alcohol use: Yes     Alcohol/week: 0.0 oz      Problem (# of Occurrences) Relation (Name,Age of Onset)    Breast Cancer (1) Maternal Grandmother: 2013    Thyroid Disease (1) Sister    Uterine Cancer (1) Mother (48):  age 48            Current Outpatient Medications   Medication Sig     levonorgestrel (MIRENA) 20 MCG/24HR IUD 1 each by Intrauterine route once Inserted 19 FCFW     levothyroxine (SYNTHROID, LEVOTHROID) 125 MCG tablet Take 2 tablets (250 mcg) by mouth daily (Patient taking differently: Take 250 mcg by mouth daily Brand Name Synthroid)     phentermine (ADIPEX-P) 30 MG capsule Take 1 capsule (30 mg) by mouth every morning     sulfamethoxazole-trimethoprim (BACTRIM/SEPTRA) 400-80 MG tablet Take 1 tablet by mouth 2 times daily for 7 days     valACYclovir (VALTREX) 500 MG tablet Take 1 tablet (500 mg) by mouth 2 times daily for 10 days     No current facility-administered medications for this visit.      No Known Allergies    ROS:  12 point review of systems negative other than symptoms noted below.  Genitourinary: Painful Urination  Skin: New Skin Lesions    OBJECTIVE:     /80   Pulse 88   Ht 1.651 m (5' 5\")   Wt 149.1 kg (328 lb 12.8 oz)   BMI 54.72 kg/m    Body mass index is 54.72 " kg/m .    Exam:  Constitutional:  Appearance: Well nourished, well developed alert, in no acute distress  Pelvic Exam:  External Genitalia:    PATIENT HAS EXTENSIVE HERPETIC OUTBREAK. SHE HAS A CLUSTER OF INTACT BLISTERS UPPER RIGHT LABIA MAJORA WITH AN ULCERATED SINGLE BLISTER NEXT TO IT. SHE THEN HAS MANY INDIVIDUAL ULCERATIONS ON HER RIGHT LABIA, RIGHT INNER LABIA AND THEN BILATERALLY UP AND DOWN HER INNER BUTTOCKS THERE IS EXTENSIVE ULCERATIONS  Vagina:     Normal vaginal vault without central or paravaginal defects, no discharge present, no inflammatory lesions present, no masses present, NO ABNORMAL DISCHARGE OR ERYTHEAM  Bladder:     Nontender to palpation  Urethra:   Urethral Body:  Urethra palpation normal, urethra structural support normal   Urethral Meatus:  No erythema or lesions present  Cervix:     Appearance healthy, no lesions present, nontender to palpation, no bleeding present, IUD STRINGS NOT SEEN BUT USED A SHORT GRAVES AND USUALLY NEEDS A LONG GRAVES TO SEE HER CERVIX. WAS BEING DONE JUST TO GET GC/C AND WET PREP DONE SO DIDN'T FOCUS ON GETTING ENOUGH VISUALIZATION TO SEE STRINGS WELL.  Uterus:     Uterus: firm, normal sized and nontender, anteverted in position.   Adnexa:     No adnexal tenderness present, no adnexal masses present  Perineum:     Perineum within normal limits, no evidence of trauma, no rashes or skin lesions present  Anus:     Anus within normal limits, no hemorrhoids present  Inguinal Lymph Nodes:     No lymphadenopathy present  Pubic Hair:     Normal pubic hair distribution for age  Genitalia and Groin:     No rashes present, no lesions present, no areas of discoloration, no masses present       In-Clinic Test Results:  Results for orders placed or performed in visit on 05/15/19 (from the past 24 hour(s))   Wet prep   Result Value Ref Range    Specimen Description Vagina     Wet Prep No clue cells seen     Wet Prep No yeast seen     Wet Prep No Trichomonas seen     Wet Prep  No WBC's seen        ASSESSMENT/PLAN:                                                        ICD-10-CM    1. Herpes simplex infection of genitourinary system A60.00 valACYclovir (VALTREX) 500 MG tablet     HSV 1 and 2 DNA by PCR     Herpes Simplex Virus 1 and 2 IgG   2. Acute cystitis without hematuria N30.00    3. Morbid obesity with BMI of 50.0-59.9, adult (H) E66.01     Z68.43    4. Screen for STD (sexually transmitted disease) Z11.3 Treponema Abs w Reflex to RPR and Titer     NEISSERIA GONORRHOEA PCR   5. Screening for chlamydial disease Z11.8 CHLAMYDIA TRACHOMATIS PCR   6. Screening for HIV (human immunodeficiency virus) Z11.4 HIV Antigen Antibody Combo   7. Vulvar lesion N90.89 Wet prep         Patient was informed that there is no doubt that she has a herpes outbreak. On further questioning her  just had a cold sore 3 weeks ago. Were certainly s.a around that time and she is sure that there was probably oral sex around that time though not 100% sure on timing. He has had some cold sores over the years. She and the kids never have thus far but hasn't had blood testing for it. He is her only sexual partner ever and have been together 16 yrs. He had had other partners before her.    PCR of the lesions was done for confirmation but also for typing. If this is type I, which is most likely is, then it is very likely that his HSV-1 orally is now transmitted to her genitally. Will start her on initial episode valtrex 500mg BID for 10 days though she is 5 days into the outbreak. Cautioned that may shorten or make less aggressive sx but may still take a while to heal since not started sooner.  In future if has an outbreak would start at first inkling of sx and do 500mg BID for 3 days  For now will do the valtrex and then can do either xylocaine 4% topically or just a lot of aquaphor. Can ice for comfort and/or soak in warm tub. Can even urinate in the tub so the urine doesn't cause so much pain. Offered pain  meds but declines at this time. Can do nsaids/tylenol as well    Will do complete STD Testing regardless of likely scenario being related to known cold sores, just to be sure.  Discussed that if hsv abs are neg this is truly a brand new infection and if pos then she may have gotten the virus over the years and just first outbreak  Likely her UTI is getting better and her pain was the HSV and not that as she'd thought. So should complete the 7 days of bactrim as well  Can stop azo as not helping anyway    Wet prep done to make sure that not other infection like yeast but clinically doesn't appear to be    In terms of phentermine she's doing great. There is a refill at pharmacy already so will get that. Will continue to monitor her eating for now since can't exercise at the moment and as soon as sx resolve will get going back to the gym.    F/u with me in 1 month for the phenetermine    Lexi Salazar MD  [facility]

## 2019-05-15 ENCOUNTER — OFFICE VISIT (OUTPATIENT)
Dept: OBGYN | Facility: CLINIC | Age: 39
End: 2019-05-15
Payer: COMMERCIAL

## 2019-05-15 VITALS
HEIGHT: 65 IN | BODY MASS INDEX: 48.82 KG/M2 | SYSTOLIC BLOOD PRESSURE: 108 MMHG | DIASTOLIC BLOOD PRESSURE: 80 MMHG | WEIGHT: 293 LBS | HEART RATE: 88 BPM

## 2019-05-15 DIAGNOSIS — E66.01 MORBID OBESITY WITH BMI OF 50.0-59.9, ADULT (H): ICD-10-CM

## 2019-05-15 DIAGNOSIS — Z11.8 SCREENING FOR CHLAMYDIAL DISEASE: ICD-10-CM

## 2019-05-15 DIAGNOSIS — N30.00 ACUTE CYSTITIS WITHOUT HEMATURIA: ICD-10-CM

## 2019-05-15 DIAGNOSIS — N90.89 VULVAR LESION: ICD-10-CM

## 2019-05-15 DIAGNOSIS — Z11.4 SCREENING FOR HIV (HUMAN IMMUNODEFICIENCY VIRUS): ICD-10-CM

## 2019-05-15 DIAGNOSIS — A60.00 HERPES SIMPLEX INFECTION OF GENITOURINARY SYSTEM: Primary | ICD-10-CM

## 2019-05-15 DIAGNOSIS — Z11.3 SCREEN FOR STD (SEXUALLY TRANSMITTED DISEASE): ICD-10-CM

## 2019-05-15 LAB
BACTERIA SPEC CULT: ABNORMAL
BACTERIA SPEC CULT: ABNORMAL
Lab: ABNORMAL
SPECIMEN SOURCE: ABNORMAL
SPECIMEN SOURCE: NORMAL
WET PREP SPEC: NORMAL

## 2019-05-15 PROCEDURE — 87529 HSV DNA AMP PROBE: CPT | Performed by: OBSTETRICS & GYNECOLOGY

## 2019-05-15 PROCEDURE — 87210 SMEAR WET MOUNT SALINE/INK: CPT | Performed by: OBSTETRICS & GYNECOLOGY

## 2019-05-15 PROCEDURE — 87491 CHLMYD TRACH DNA AMP PROBE: CPT | Performed by: OBSTETRICS & GYNECOLOGY

## 2019-05-15 PROCEDURE — 99214 OFFICE O/P EST MOD 30 MIN: CPT | Performed by: OBSTETRICS & GYNECOLOGY

## 2019-05-15 PROCEDURE — 87529 HSV DNA AMP PROBE: CPT | Mod: 59 | Performed by: OBSTETRICS & GYNECOLOGY

## 2019-05-15 PROCEDURE — 36415 COLL VENOUS BLD VENIPUNCTURE: CPT | Performed by: OBSTETRICS & GYNECOLOGY

## 2019-05-15 PROCEDURE — 86780 TREPONEMA PALLIDUM: CPT | Performed by: OBSTETRICS & GYNECOLOGY

## 2019-05-15 PROCEDURE — 87389 HIV-1 AG W/HIV-1&-2 AB AG IA: CPT | Performed by: OBSTETRICS & GYNECOLOGY

## 2019-05-15 PROCEDURE — 87591 N.GONORRHOEAE DNA AMP PROB: CPT | Performed by: OBSTETRICS & GYNECOLOGY

## 2019-05-15 PROCEDURE — 86696 HERPES SIMPLEX TYPE 2 TEST: CPT | Performed by: OBSTETRICS & GYNECOLOGY

## 2019-05-15 PROCEDURE — 86695 HERPES SIMPLEX TYPE 1 TEST: CPT | Performed by: OBSTETRICS & GYNECOLOGY

## 2019-05-15 RX ORDER — VALACYCLOVIR HYDROCHLORIDE 500 MG/1
500 TABLET, FILM COATED ORAL 2 TIMES DAILY
Qty: 20 TABLET | Refills: 3 | Status: SHIPPED | OUTPATIENT
Start: 2019-05-15 | End: 2019-06-14

## 2019-05-15 ASSESSMENT — MIFFLIN-ST. JEOR: SCORE: 2162.31

## 2019-05-17 PROBLEM — A60.00 GENITAL HERPES: Status: ACTIVE | Noted: 2019-05-15

## 2019-05-17 LAB
C TRACH DNA SPEC QL NAA+PROBE: NEGATIVE
HIV 1+2 AB+HIV1 P24 AG SERPL QL IA: NONREACTIVE
HSV1 DNA SPEC QL NAA+PROBE: POSITIVE
HSV1 IGG SERPL QL IA: 1 AI (ref 0–0.8)
HSV2 DNA SPEC QL NAA+PROBE: NEGATIVE
HSV2 IGG SERPL QL IA: 0.3 AI (ref 0–0.8)
N GONORRHOEA DNA SPEC QL NAA+PROBE: NEGATIVE
SPECIMEN SOURCE: ABNORMAL
SPECIMEN SOURCE: NORMAL
SPECIMEN SOURCE: NORMAL
T PALLIDUM AB SER QL: NONREACTIVE

## 2019-06-11 NOTE — PROGRESS NOTES
"    SUBJECTIVE:                                                   Yodit Harvey is a 38 year old female who presents to clinic today for the following health issue(s):  Patient presents with:  Medication Follow Up: Phentermine      HPI:  Patient did not lose weight this month and actually is up 3#. However this was a really hard month for her after her HSV diagnosis.  Was not able to go to the gym for at least 1.5 weeks before she saw me and then at least 2.5 weeks since. Has gone to the gym 3x/since she was in. Had too much pain and was unable to go  In addition to that she was really upset and angry with her . Admits she was \"eating my feelings\" and was really sedentary. Missed her phentermine doses multiple times in the last month. Partly b/c of forgetting and partly b/c didn't even want to take it b/c wanted to eat and snack and almost rebel out of frustration about the HSV  Now is feeling much better and starting to get to a better place with Anmol and hoping to get back on track  Does feel that there is still a spot near her anus that is really sore and uncomfortable but getting slowly better    No LMP recorded. (Menstrual status: IUD)..   Patient is sexually active, .  Using IUD and condoms for contraception.    reports that she has never smoked. She has never used smokeless tobacco.    STD testing offered?  Declined    Health maintenance updated:  yes    Today's PHQ-2 Score: No flowsheet data found.  Today's PHQ-9 Score:   PHQ-9 SCORE 2019   PHQ-9 Total Score 2     Today's MELINA-7 Score:   MELINA-7 SCORE 4/10/2019   Total Score 1       Problem list and histories reviewed & adjusted, as indicated.  Additional history: as documented.    Patient Active Problem List   Diagnosis     Menorrhagia with regular cycle     Morbid obesity, unspecified obesity type (H)     Family history of ovarian cancer     Hypothyroidism     Morbid obesity with BMI of 50.0-59.9, adult (H)     Presence of intrauterine " "contraceptive device (IUD)     Genital herpes     Past Surgical History:   Procedure Laterality Date      SECTION  12/10/08, 11     HC CREATE EARDRUM OPENING,GEN ANESTH       HC TOOTH EXTRACTION W/FORCEP        Social History     Tobacco Use     Smoking status: Never Smoker     Smokeless tobacco: Never Used   Substance Use Topics     Alcohol use: Yes     Alcohol/week: 0.0 oz      Problem (# of Occurrences) Relation (Name,Age of Onset)    Breast Cancer (1) Maternal Grandmother: 2013    Thyroid Disease (1) Sister    Uterine Cancer (1) Mother (48):  age 48            Current Outpatient Medications   Medication Sig     levonorgestrel (MIRENA) 20 MCG/24HR IUD 1 each by Intrauterine route once Inserted 19 FCFW     levothyroxine (SYNTHROID, LEVOTHROID) 125 MCG tablet Take 2 tablets (250 mcg) by mouth daily (Patient taking differently: Take 250 mcg by mouth daily Brand Name Synthroid)     phentermine (ADIPEX-P) 30 MG capsule Take 1 capsule (30 mg) by mouth every morning     No current facility-administered medications for this visit.      No Known Allergies    ROS:  12 point review of systems negative other than symptoms noted below.    OBJECTIVE:     /66   Pulse 84   Ht 1.651 m (5' 5\")   Wt (!) 150.4 kg (331 lb 9.6 oz)   BMI 55.18 kg/m    Body mass index is 55.18 kg/m .    Exam:  Constitutional:  Appearance: Well nourished, well developed alert, in no acute distress  Chest:  Respiratory Effort:  Breathing unlabored, CTA bilaterally  Cardiovascular: Heart: Auscultation:  Regular rate, normal rhythm, no murmurs present   Ext:NT, NE    In-Clinic Test Results:  No results found for this or any previous visit (from the past 24 hour(s)).    ASSESSMENT/PLAN:                                                        ICD-10-CM    1. Morbid obesity with BMI of 50.0-59.9, adult (H) E66.01     Z68.43        Had to leave abruptly for a delivery so didn't quite get to finish our planning  Reassured her " pain is likely going to take a bit longer to feel better given how severe her outbreak was but will get better with time  Will now refocus on herself and her health  Will take her med daily, will get back to the gym at least 5x/week like she was and really pay attention to her intake and what types of foods she's choosing, etc  Thinks she has one refill left so will not do one today and then will f/u in 1 month. If gets home and doesn't have a refill will contact me to send it in      Lexi Salazar MD  [facility] [place]

## 2019-06-14 ENCOUNTER — OFFICE VISIT (OUTPATIENT)
Dept: OBGYN | Facility: CLINIC | Age: 39
End: 2019-06-14
Payer: COMMERCIAL

## 2019-06-14 VITALS
DIASTOLIC BLOOD PRESSURE: 66 MMHG | HEIGHT: 65 IN | WEIGHT: 293 LBS | SYSTOLIC BLOOD PRESSURE: 110 MMHG | HEART RATE: 84 BPM | BODY MASS INDEX: 48.82 KG/M2

## 2019-06-14 DIAGNOSIS — E66.01 MORBID OBESITY WITH BMI OF 50.0-59.9, ADULT (H): Primary | ICD-10-CM

## 2019-06-14 PROCEDURE — 99213 OFFICE O/P EST LOW 20 MIN: CPT | Performed by: OBSTETRICS & GYNECOLOGY

## 2019-06-14 ASSESSMENT — ANXIETY QUESTIONNAIRES
3. WORRYING TOO MUCH ABOUT DIFFERENT THINGS: SEVERAL DAYS
5. BEING SO RESTLESS THAT IT IS HARD TO SIT STILL: NOT AT ALL
IF YOU CHECKED OFF ANY PROBLEMS ON THIS QUESTIONNAIRE, HOW DIFFICULT HAVE THESE PROBLEMS MADE IT FOR YOU TO DO YOUR WORK, TAKE CARE OF THINGS AT HOME, OR GET ALONG WITH OTHER PEOPLE: NOT DIFFICULT AT ALL
1. FEELING NERVOUS, ANXIOUS, OR ON EDGE: NOT AT ALL
2. NOT BEING ABLE TO STOP OR CONTROL WORRYING: NOT AT ALL
6. BECOMING EASILY ANNOYED OR IRRITABLE: SEVERAL DAYS

## 2019-06-14 ASSESSMENT — PATIENT HEALTH QUESTIONNAIRE - PHQ9
SUM OF ALL RESPONSES TO PHQ QUESTIONS 1-9: 2
5. POOR APPETITE OR OVEREATING: SEVERAL DAYS

## 2019-06-14 ASSESSMENT — MIFFLIN-ST. JEOR: SCORE: 2175.01

## 2019-07-17 ENCOUNTER — TELEPHONE (OUTPATIENT)
Dept: OBGYN | Facility: CLINIC | Age: 39
End: 2019-07-17

## 2019-07-17 NOTE — TELEPHONE ENCOUNTER
Prior Authorization Retail Medication Request    Medication/Dose: phentermine 30mg  ICD code (if different than what is on RX):    Previously Tried and Failed:    Rationale:  Patient started phentermine in February after already making some healthy changes to diet and exercise. She started at BMI of 58.5, 357lbs. SHe continues to make healthier food options and exercise. As of 6/14/19 her weith was 331 at BMI of 55.18    Insurance Name:  [employer]  [phone]  Insurance ID:  [id number]      Pharmacy Information (if different than what is on RX)  Name:    Phone:

## 2019-07-25 NOTE — TELEPHONE ENCOUNTER
Prior Authorization Approval    Authorization Effective Date: 6/25/2019  Authorization Expiration Date: 7/24/2020  Medication: phentermine 30mg- APPROVED   Approved Dose/Quantity:   Reference #:     Insurance Company:    Expected CoPay:       CoPay Card Available:      Foundation Assistance Needed:    Which Pharmacy is filling the prescription (Not needed for infusion/clinic administered): [facility] [id number] [place] [facility] [address]  Pharmacy Notified: Yes  Patient Notified: Comment:  **Instructed pharmacy to notify patient when script is ready to /ship.**

## 2019-10-01 ENCOUNTER — HEALTH MAINTENANCE LETTER (OUTPATIENT)
Age: 39
End: 2019-10-01

## 2019-11-06 ENCOUNTER — MYC MEDICAL ADVICE (OUTPATIENT)
Dept: OBGYN | Facility: CLINIC | Age: 39
End: 2019-11-06

## 2019-11-06 DIAGNOSIS — E03.9 ACQUIRED HYPOTHYROIDISM: Primary | ICD-10-CM

## 2019-11-06 NOTE — TELEPHONE ENCOUNTER
Yes that's fine. Am I managing her thyroid though? I thought she was seeing either an endo or a PCP or something now for that?

## 2019-11-07 DIAGNOSIS — E03.9 ACQUIRED HYPOTHYROIDISM: ICD-10-CM

## 2019-11-07 PROCEDURE — 84443 ASSAY THYROID STIM HORMONE: CPT | Performed by: OBSTETRICS & GYNECOLOGY

## 2019-11-07 PROCEDURE — 36415 COLL VENOUS BLD VENIPUNCTURE: CPT | Performed by: OBSTETRICS & GYNECOLOGY

## 2019-11-08 ENCOUNTER — MYC MEDICAL ADVICE (OUTPATIENT)
Dept: OBGYN | Facility: CLINIC | Age: 39
End: 2019-11-08

## 2019-11-08 DIAGNOSIS — E03.9 ACQUIRED HYPOTHYROIDISM: Primary | ICD-10-CM

## 2019-11-08 DIAGNOSIS — E03.9 HYPOTHYROIDISM: ICD-10-CM

## 2019-11-08 LAB — TSH SERPL DL<=0.005 MIU/L-ACNC: 128.85 MU/L (ref 0.4–4)

## 2019-11-08 RX ORDER — LEVOTHYROXINE SODIUM 125 UG/1
250 TABLET ORAL DAILY
Qty: 120 TABLET | Refills: 0 | Status: SHIPPED | OUTPATIENT
Start: 2019-11-08 | End: 2019-12-16

## 2019-11-08 NOTE — TELEPHONE ENCOUNTER
My Chart messages:  11/6/19  10:37 am  Good morning!   I was wondering if I could get lab work done for my thyroid.  I do not think I am level.  Is this possible?  Or should I set up an appointment with Dr. Salazar first?   Thank you for your time!   Yodit      1:47 pm  Yuri Mrianda,   Thank you for getting back to me so quickly.  I could not reply to your message so sorry this is a new one.  Yes, I did have another primary but she left that clinic and I have not gone back to find another one.  So I was just checking with Dr. Salazar.  Please thank Dr. Salazar for putting in that order for lab work.  Do I just call to set up a time to go in, or do I just walk in type of a thing?  Thank you again!   Yodit     11/8/19  1:35 pm  Yuri Salazar!   Ok, those results are very high as I could tell they would off.  I was seeing a primary but like I mentioned in another email she left that clinic and I'll be honest I just dropped the ball on getting into someone else. With that I have been out of medication and it's been over a month maybe even close to two since I have taken any medication.  I know that is not a good thing.....I know!!     Please let me know what next steps are.   Thank you!   Yodit     Left message with patient stressing the importance of an office visit with primary care or endocrinologist.      Routing to provider  Jeanie Randall, RN on 11/8/2019 at 2:40 PM

## 2019-12-06 DIAGNOSIS — E03.9 ACQUIRED HYPOTHYROIDISM: ICD-10-CM

## 2019-12-06 RX ORDER — LEVOTHYROXINE SODIUM 125 MCG
TABLET ORAL
Qty: 60 TABLET | Refills: 1 | OUTPATIENT
Start: 2019-12-06

## 2019-12-06 NOTE — TELEPHONE ENCOUNTER
"Requested Prescriptions   Pending Prescriptions Disp Refills     SYNTHROID 125 MCG tablet [Pharmacy Med Name: SYNTHROID 125 MCG TABLET] 60 tablet 1     Sig: TAKE 2 TABLETS (250 MCG) BY MOUTH DAILY       Thyroid Protocol Failed - 12/6/2019  8:35 AM        Failed - Normal TSH on file in past 12 months     Recent Labs   Lab Test 11/07/19  1120   .85*              Passed - Patient is 12 years or older        Passed - Recent (12 mo) or future (30 days) visit within the authorizing provider's specialty     Patient has had an office visit with the authorizing provider or a provider within the authorizing providers department within the previous 12 mos or has a future within next 30 days. See \"Patient Info\" tab in inbasket, or \"Choose Columns\" in Meds & Orders section of the refill encounter.              Passed - Medication is active on med list        Passed - No active pregnancy on record     If patient is pregnant or has had a positive pregnancy test, please check TSH.          Passed - No positive pregnancy test in past 12 months     If patient is pregnant or has had a positive pregnancy test, please check TSH.          Refill not needed 120 tablets sent 11/8/19  Ruth Cohen RN on 12/6/2019 at 8:48 AM    "

## 2019-12-13 DIAGNOSIS — E03.9 ACQUIRED HYPOTHYROIDISM: ICD-10-CM

## 2019-12-13 RX ORDER — LEVOTHYROXINE SODIUM 125 UG/1
250 TABLET ORAL DAILY
Qty: 120 TABLET | Refills: 0 | Status: CANCELLED | OUTPATIENT
Start: 2019-12-13

## 2019-12-13 NOTE — TELEPHONE ENCOUNTER
"Requested Prescriptions   Pending Prescriptions Disp Refills     levothyroxine (SYNTHROID/LEVOTHROID) 125 MCG tablet 120 tablet 0     Sig: Take 2 tablets (250 mcg) by mouth daily       Thyroid Protocol Failed - 12/13/2019  3:10 PM        Failed - Normal TSH on file in past 12 months     Recent Labs   Lab Test 11/07/19  1120   .85*              Passed - Patient is 12 years or older        Passed - Recent (12 mo) or future (30 days) visit within the authorizing provider's specialty     Patient has had an office visit with the authorizing provider or a provider within the authorizing providers department within the previous 12 mos or has a future within next 30 days. See \"Patient Info\" tab in inbasket, or \"Choose Columns\" in Meds & Orders section of the refill encounter.              Passed - Medication is active on med list        Passed - No active pregnancy on record     If patient is pregnant or has had a positive pregnancy test, please check TSH.          Passed - No positive pregnancy test in past 12 months     If patient is pregnant or has had a positive pregnancy test, please check TSH.          Last Written Prescription Date:  11/08/2019  Last Fill Quantity: 120,  # refills: 0   Last office visit: 6/14/2019 with prescribing provider:  Dr. Salazar   Future Office Visit:  NONE    "

## 2019-12-16 DIAGNOSIS — E03.9 ACQUIRED HYPOTHYROIDISM: ICD-10-CM

## 2019-12-16 RX ORDER — LEVOTHYROXINE SODIUM 125 UG/1
250 TABLET ORAL DAILY
Qty: 28 TABLET | Refills: 0 | Status: ON HOLD | OUTPATIENT
Start: 2019-12-16 | End: 2024-02-05

## 2019-12-16 NOTE — TELEPHONE ENCOUNTER
Left message that the rx request had come to us.  Will not be refilled at this time.  Hoping that the patient has established care with a primary or endocrinologist.  At the very least have the TSH redrawn at her nearest  clinic [facility].  Told to call clinic to follow up.  Jeanie Randall, RN on 12/16/2019 at 11:44 AM

## 2019-12-16 NOTE — TELEPHONE ENCOUNTER
Called back  And left message informing  that 2 weeks of medication has been sent. Again encouraged to establish care if she hasn't already.  Jeanie Randall RN on 12/16/2019 at 11:50 AM

## 2020-01-24 ENCOUNTER — TELEPHONE (OUTPATIENT)
Dept: OBGYN | Facility: CLINIC | Age: 40
End: 2020-01-24

## 2020-01-24 NOTE — TELEPHONE ENCOUNTER
1st attempt: LMTCB on thyroid update-established care w/ Endo, if not needs to have TSH checked ASAP, extended out 2 weeks

## 2020-01-24 NOTE — LETTER
[facility]  [address] [address]  [phone]        February 12, 2020    Yodit Harvey  [address] [facility] [address]              Dear Yodit Harvey    This is to remind you that your TSH is due.    You may call our office at [phone] to schedule an appointment.    Please disregard this notice if you have already had your labs drawn or made an appointment.        Sincerely,        Lexi Salazar MD

## 2020-03-22 ENCOUNTER — HEALTH MAINTENANCE LETTER (OUTPATIENT)
Age: 40
End: 2020-03-22

## 2021-01-15 ENCOUNTER — HEALTH MAINTENANCE LETTER (OUTPATIENT)
Age: 41
End: 2021-01-15

## 2021-05-15 ENCOUNTER — HEALTH MAINTENANCE LETTER (OUTPATIENT)
Age: 41
End: 2021-05-15

## 2021-09-04 ENCOUNTER — HEALTH MAINTENANCE LETTER (OUTPATIENT)
Age: 41
End: 2021-09-04

## 2022-06-11 ENCOUNTER — HEALTH MAINTENANCE LETTER (OUTPATIENT)
Age: 42
End: 2022-06-11

## 2022-10-16 ENCOUNTER — HEALTH MAINTENANCE LETTER (OUTPATIENT)
Age: 42
End: 2022-10-16

## 2023-06-17 ENCOUNTER — HEALTH MAINTENANCE LETTER (OUTPATIENT)
Age: 43
End: 2023-06-17

## 2023-12-20 ENCOUNTER — TELEPHONE (OUTPATIENT)
Dept: OBGYN | Facility: CLINIC | Age: 43
End: 2023-12-20
Payer: COMMERCIAL

## 2023-12-20 NOTE — TELEPHONE ENCOUNTER
[facility] Call Center    Phone Message    May a detailed message be left on voicemail: yes     Reason for Call: Other: Pt is calling because she is wanting to see Dr. Salazar and the last office visit that the patient had was 2019. Patient states that she has a phobia of going to the doctors and Dr. Salazar is the only one that she is comfortable seeing, and pt is needing to be seen for gyn reasons. Writer informed patients that Dr. Salazar is only seeing her own return patients and Pt requested that a message is to be sent. Please call Pt to discuss     Action Taken: Other: [facility]    Travel Screening: Not Applicable

## 2023-12-20 NOTE — TELEPHONE ENCOUNTER
"Last Office Visit 6/14/19 with Dr. Salazar    Pt calling because she would like to be seen for some ongoing issues she has been having.  She has not seen any doctors or primary care since 2019.    Pt states \"something is going on\"  Reports hard \"fatty tissue\" lumps on legs - both of legs behind legs on thighs. She noticed these over a year ago and can barely walk. The lumps are hard and do not move.  Her legs do not close all the way due to these lumps. Pt cannot go shopping or take her children to appointments due to this. Walking long distances is difficult. She needs to use a wheelchair to go long distances and \"feels trapped\". Denies pain or discoloration of legs    She feels like her back is \"becoming a corkscrew\" with every step she takes and it gets tighter and tighter when she walks until she sits down to rest    She believes her thyroid levels are off. She has had weight gain, is tired all the time and has dry skin. Her throat feels constricted everytime she talks, she does have a raspy voice when speaking on the phone. She states she was on synthroid in the past but is not on any current medications.    She has a mirena IUD in place that is due to come out    Verbalized to pt that a primary care provider would be appropriate for these concerns, but pt states she has a fear of doctors and feels as though Dr. Salazar is the only doctor that is calm and actually listens to her. States Dr. Salazar is \"the only reason I want to go back to the doctors\"    Pt states if Dr Salazar is willing and able to see her, it will be greatly appreciated.  Routing to provider to advise - would you be willing to see her and reestablish care?    Dunia Petersen RN on 12/20/2023 at 10:33 AM         "

## 2023-12-21 NOTE — TELEPHONE ENCOUNTER
I can nearly promise this is all significant weight related issues and though I can see her, check her thyroid, and start some eval, this is definitely going to need PCP going forward.    This is most certainly a full 30 minute consultation so nothing less than that meaning there well may be a wait time but they can make an appointment for it. Nothing crammed in to a 15 min spot though, so make sure the schedulers know this is only going to be ok in an appropriate slot.    However I think she absolutely just needs to find a PCP in addition to me just in general b/c these issues are way beyond a gyn scope of practice

## 2023-12-21 NOTE — TELEPHONE ENCOUNTER
Called pt and gave the response from Dr Salazar.  Pt would like to schedule a visit w Dr Salazar as well as she has some GYN issues she would like to discuss but understands a PCP is also recommended.    Transferred to scheduling. NEEDS TO BE A 30-45 min slot, nothing less and 1st available.    Neva Villa RN on 12/21/2023 at 8:34 AM

## 2024-01-13 ENCOUNTER — HEALTH MAINTENANCE LETTER (OUTPATIENT)
Age: 44
End: 2024-01-13

## 2024-01-27 ENCOUNTER — APPOINTMENT (OUTPATIENT)
Dept: GENERAL RADIOLOGY | Facility: CLINIC | Age: 44
End: 2024-01-27
Attending: EMERGENCY MEDICINE
Payer: COMMERCIAL

## 2024-01-27 ENCOUNTER — APPOINTMENT (OUTPATIENT)
Dept: CT IMAGING | Facility: CLINIC | Age: 44
End: 2024-01-27
Attending: EMERGENCY MEDICINE
Payer: COMMERCIAL

## 2024-01-27 ENCOUNTER — HOSPITAL ENCOUNTER (INPATIENT)
Facility: CLINIC | Age: 44
LOS: 10 days | Discharge: HOME HEALTH | End: 2024-02-06
Attending: EMERGENCY MEDICINE | Admitting: STUDENT IN AN ORGANIZED HEALTH CARE EDUCATION/TRAINING PROGRAM
Payer: COMMERCIAL

## 2024-01-27 DIAGNOSIS — R09.02 HYPOXIA: ICD-10-CM

## 2024-01-27 DIAGNOSIS — J96.01 ACUTE RESPIRATORY FAILURE WITH HYPOXIA AND HYPERCAPNIA (H): ICD-10-CM

## 2024-01-27 DIAGNOSIS — J96.02 ACUTE RESPIRATORY FAILURE WITH HYPOXIA AND HYPERCAPNIA (H): ICD-10-CM

## 2024-01-27 DIAGNOSIS — J96.11 CHRONIC RESPIRATORY FAILURE WITH HYPOXIA AND HYPERCAPNIA (H): ICD-10-CM

## 2024-01-27 DIAGNOSIS — J96.12 CHRONIC RESPIRATORY FAILURE WITH HYPOXIA AND HYPERCAPNIA (H): ICD-10-CM

## 2024-01-27 DIAGNOSIS — E66.01 MORBID OBESITY WITH BMI OF 50.0-59.9, ADULT (H): Primary | ICD-10-CM

## 2024-01-27 DIAGNOSIS — E66.813 CLASS 3 OBESITY WITH ALVEOLAR HYPOVENTILATION, SERIOUS COMORBIDITY, AND BODY MASS INDEX (BMI) OF 50.0 TO 59.9 IN ADULT (H): ICD-10-CM

## 2024-01-27 DIAGNOSIS — E03.9 HYPOTHYROIDISM, UNSPECIFIED TYPE: ICD-10-CM

## 2024-01-27 DIAGNOSIS — G47.33 OBSTRUCTIVE SLEEP APNEA SYNDROME: ICD-10-CM

## 2024-01-27 DIAGNOSIS — I47.10 SVT (SUPRAVENTRICULAR TACHYCARDIA) (H): ICD-10-CM

## 2024-01-27 DIAGNOSIS — E66.2 CLASS 3 OBESITY WITH ALVEOLAR HYPOVENTILATION, SERIOUS COMORBIDITY, AND BODY MASS INDEX (BMI) OF 50.0 TO 59.9 IN ADULT (H): ICD-10-CM

## 2024-01-27 DIAGNOSIS — E03.9 ACQUIRED HYPOTHYROIDISM: ICD-10-CM

## 2024-01-27 LAB
ALBUMIN SERPL BCG-MCNC: 4.2 G/DL (ref 3.5–5.2)
ALP SERPL-CCNC: 51 U/L (ref 40–150)
ALT SERPL W P-5'-P-CCNC: 27 U/L (ref 0–50)
ANION GAP SERPL CALCULATED.3IONS-SCNC: 14 MMOL/L (ref 7–15)
AST SERPL W P-5'-P-CCNC: 37 U/L (ref 0–45)
ATRIAL RATE - MUSE: 96 BPM
ATRIAL RATE - MUSE: NORMAL BPM
BASE EXCESS BLDV CALC-SCNC: 6 MMOL/L (ref -3–3)
BASOPHILS # BLD AUTO: 0.1 10E3/UL (ref 0–0.2)
BASOPHILS NFR BLD AUTO: 1 %
BILIRUB SERPL-MCNC: 0.3 MG/DL
BUN SERPL-MCNC: 16.1 MG/DL (ref 6–20)
CALCIUM SERPL-MCNC: 9.3 MG/DL (ref 8.6–10)
CHLORIDE SERPL-SCNC: 98 MMOL/L (ref 98–107)
CREAT SERPL-MCNC: 1.47 MG/DL (ref 0.51–0.95)
D DIMER PPP FEU-MCNC: 0.38 UG/ML FEU (ref 0–0.5)
DEPRECATED HCO3 PLAS-SCNC: 27 MMOL/L (ref 22–29)
DIASTOLIC BLOOD PRESSURE - MUSE: NORMAL MMHG
DIASTOLIC BLOOD PRESSURE - MUSE: NORMAL MMHG
EGFRCR SERPLBLD CKD-EPI 2021: 44 ML/MIN/1.73M2
EOSINOPHIL # BLD AUTO: 0.2 10E3/UL (ref 0–0.7)
EOSINOPHIL NFR BLD AUTO: 2 %
ERYTHROCYTE [DISTWIDTH] IN BLOOD BY AUTOMATED COUNT: 15.7 % (ref 10–15)
ETHANOL SERPL-MCNC: <0.01 G/DL
FERRITIN SERPL-MCNC: 152 NG/ML (ref 6–175)
GLUCOSE SERPL-MCNC: 158 MG/DL (ref 70–99)
HBA1C MFR BLD: 5.7 %
HCG SERPL QL: NEGATIVE
HCO3 BLDV-SCNC: 33 MMOL/L (ref 21–28)
HCT VFR BLD AUTO: 34.3 % (ref 35–47)
HGB BLD-MCNC: 10.6 G/DL (ref 11.7–15.7)
HOLD SPECIMEN: NORMAL
HOLD SPECIMEN: NORMAL
IMM GRANULOCYTES # BLD: 0.3 10E3/UL
IMM GRANULOCYTES NFR BLD: 3 %
INTERPRETATION ECG - MUSE: NORMAL
INTERPRETATION ECG - MUSE: NORMAL
IRON BINDING CAPACITY (ROCHE): 309 UG/DL (ref 240–430)
IRON SATN MFR SERPL: 16 % (ref 15–46)
IRON SERPL-MCNC: 49 UG/DL (ref 37–145)
LYMPHOCYTES # BLD AUTO: 1.6 10E3/UL (ref 0.8–5.3)
LYMPHOCYTES NFR BLD AUTO: 15 %
MAGNESIUM SERPL-MCNC: 2 MG/DL (ref 1.7–2.3)
MCH RBC QN AUTO: 31.1 PG (ref 26.5–33)
MCHC RBC AUTO-ENTMCNC: 30.9 G/DL (ref 31.5–36.5)
MCV RBC AUTO: 101 FL (ref 78–100)
MONOCYTES # BLD AUTO: 0.3 10E3/UL (ref 0–1.3)
MONOCYTES NFR BLD AUTO: 3 %
NEUTROPHILS # BLD AUTO: 8.4 10E3/UL (ref 1.6–8.3)
NEUTROPHILS NFR BLD AUTO: 76 %
NRBC # BLD AUTO: 0 10E3/UL
NRBC BLD AUTO-RTO: 0 /100
NT-PROBNP SERPL-MCNC: 1199 PG/ML (ref 0–450)
O2/TOTAL GAS SETTING VFR VENT: 2 %
OXYHGB MFR BLDV: 21 % (ref 70–75)
P AXIS - MUSE: 71 DEGREES
P AXIS - MUSE: NORMAL DEGREES
PCO2 BLDV: 62 MM HG (ref 40–50)
PH BLDV: 7.34 [PH] (ref 7.32–7.43)
PLATELET # BLD AUTO: 234 10E3/UL (ref 150–450)
PO2 BLDV: 19 MM HG (ref 25–47)
POTASSIUM SERPL-SCNC: 4.2 MMOL/L (ref 3.4–5.3)
PR INTERVAL - MUSE: 160 MS
PR INTERVAL - MUSE: NORMAL MS
PROT SERPL-MCNC: 7.6 G/DL (ref 6.4–8.3)
QRS DURATION - MUSE: 60 MS
QRS DURATION - MUSE: 70 MS
QT - MUSE: 260 MS
QT - MUSE: 328 MS
QTC - MUSE: 414 MS
QTC - MUSE: 447 MS
R AXIS - MUSE: 112 DEGREES
R AXIS - MUSE: 118 DEGREES
RBC # BLD AUTO: 3.41 10E6/UL (ref 3.8–5.2)
SAO2 % BLDV: 21.3 % (ref 70–75)
SODIUM SERPL-SCNC: 139 MMOL/L (ref 135–145)
SYSTOLIC BLOOD PRESSURE - MUSE: NORMAL MMHG
SYSTOLIC BLOOD PRESSURE - MUSE: NORMAL MMHG
T AXIS - MUSE: -6 DEGREES
T AXIS - MUSE: -85 DEGREES
T4 FREE SERPL-MCNC: 0.18 NG/DL (ref 0.9–1.7)
TROPONIN T SERPL HS-MCNC: 146 NG/L
TROPONIN T SERPL HS-MCNC: 170 NG/L
TROPONIN T SERPL HS-MCNC: 65 NG/L
TROPONIN T SERPL HS-MCNC: 95 NG/L
TSH SERPL DL<=0.005 MIU/L-ACNC: 144.9 UIU/ML (ref 0.3–4.2)
VENTRICULAR RATE- MUSE: 178 BPM
VENTRICULAR RATE- MUSE: 96 BPM
VIT B12 SERPL-MCNC: 392 PG/ML (ref 232–1245)
WBC # BLD AUTO: 11 10E3/UL (ref 4–11)

## 2024-01-27 PROCEDURE — 36415 COLL VENOUS BLD VENIPUNCTURE: CPT | Performed by: EMERGENCY MEDICINE

## 2024-01-27 PROCEDURE — 83880 ASSAY OF NATRIURETIC PEPTIDE: CPT | Performed by: STUDENT IN AN ORGANIZED HEALTH CARE EDUCATION/TRAINING PROGRAM

## 2024-01-27 PROCEDURE — 83036 HEMOGLOBIN GLYCOSYLATED A1C: CPT | Performed by: STUDENT IN AN ORGANIZED HEALTH CARE EDUCATION/TRAINING PROGRAM

## 2024-01-27 PROCEDURE — 84443 ASSAY THYROID STIM HORMONE: CPT | Performed by: EMERGENCY MEDICINE

## 2024-01-27 PROCEDURE — 83540 ASSAY OF IRON: CPT | Performed by: STUDENT IN AN ORGANIZED HEALTH CARE EDUCATION/TRAINING PROGRAM

## 2024-01-27 PROCEDURE — 96374 THER/PROPH/DIAG INJ IV PUSH: CPT

## 2024-01-27 PROCEDURE — 84484 ASSAY OF TROPONIN QUANT: CPT | Performed by: STUDENT IN AN ORGANIZED HEALTH CARE EDUCATION/TRAINING PROGRAM

## 2024-01-27 PROCEDURE — 258N000003 HC RX IP 258 OP 636: Performed by: EMERGENCY MEDICINE

## 2024-01-27 PROCEDURE — 82607 VITAMIN B-12: CPT | Performed by: STUDENT IN AN ORGANIZED HEALTH CARE EDUCATION/TRAINING PROGRAM

## 2024-01-27 PROCEDURE — 250N000011 HC RX IP 250 OP 636: Mod: JZ

## 2024-01-27 PROCEDURE — 210N000002 HC R&B HEART CARE

## 2024-01-27 PROCEDURE — 96361 HYDRATE IV INFUSION ADD-ON: CPT

## 2024-01-27 PROCEDURE — 82805 BLOOD GASES W/O2 SATURATION: CPT | Performed by: EMERGENCY MEDICINE

## 2024-01-27 PROCEDURE — 96375 TX/PRO/DX INJ NEW DRUG ADDON: CPT

## 2024-01-27 PROCEDURE — 83735 ASSAY OF MAGNESIUM: CPT | Performed by: EMERGENCY MEDICINE

## 2024-01-27 PROCEDURE — 99222 1ST HOSP IP/OBS MODERATE 55: CPT | Performed by: INTERNAL MEDICINE

## 2024-01-27 PROCEDURE — 99291 CRITICAL CARE FIRST HOUR: CPT | Mod: 25

## 2024-01-27 PROCEDURE — 84439 ASSAY OF FREE THYROXINE: CPT | Performed by: EMERGENCY MEDICINE

## 2024-01-27 PROCEDURE — 83550 IRON BINDING TEST: CPT | Performed by: STUDENT IN AN ORGANIZED HEALTH CARE EDUCATION/TRAINING PROGRAM

## 2024-01-27 PROCEDURE — 250N000013 HC RX MED GY IP 250 OP 250 PS 637: Performed by: EMERGENCY MEDICINE

## 2024-01-27 PROCEDURE — 93005 ELECTROCARDIOGRAM TRACING: CPT

## 2024-01-27 PROCEDURE — 250N000011 HC RX IP 250 OP 636: Performed by: EMERGENCY MEDICINE

## 2024-01-27 PROCEDURE — 84484 ASSAY OF TROPONIN QUANT: CPT | Performed by: EMERGENCY MEDICINE

## 2024-01-27 PROCEDURE — 99223 1ST HOSP IP/OBS HIGH 75: CPT | Performed by: STUDENT IN AN ORGANIZED HEALTH CARE EDUCATION/TRAINING PROGRAM

## 2024-01-27 PROCEDURE — 85379 FIBRIN DEGRADATION QUANT: CPT | Performed by: EMERGENCY MEDICINE

## 2024-01-27 PROCEDURE — 250N000013 HC RX MED GY IP 250 OP 250 PS 637: Performed by: STUDENT IN AN ORGANIZED HEALTH CARE EDUCATION/TRAINING PROGRAM

## 2024-01-27 PROCEDURE — 36415 COLL VENOUS BLD VENIPUNCTURE: CPT | Performed by: STUDENT IN AN ORGANIZED HEALTH CARE EDUCATION/TRAINING PROGRAM

## 2024-01-27 PROCEDURE — 84703 CHORIONIC GONADOTROPIN ASSAY: CPT | Performed by: EMERGENCY MEDICINE

## 2024-01-27 PROCEDURE — 71046 X-RAY EXAM CHEST 2 VIEWS: CPT

## 2024-01-27 PROCEDURE — 250N000009 HC RX 250: Performed by: EMERGENCY MEDICINE

## 2024-01-27 PROCEDURE — 85025 COMPLETE CBC W/AUTO DIFF WBC: CPT | Performed by: EMERGENCY MEDICINE

## 2024-01-27 PROCEDURE — 71275 CT ANGIOGRAPHY CHEST: CPT

## 2024-01-27 PROCEDURE — 93005 ELECTROCARDIOGRAM TRACING: CPT | Mod: 76

## 2024-01-27 PROCEDURE — 73610 X-RAY EXAM OF ANKLE: CPT | Mod: LT

## 2024-01-27 PROCEDURE — 82728 ASSAY OF FERRITIN: CPT | Performed by: STUDENT IN AN ORGANIZED HEALTH CARE EDUCATION/TRAINING PROGRAM

## 2024-01-27 PROCEDURE — 82077 ASSAY SPEC XCP UR&BREATH IA: CPT | Performed by: EMERGENCY MEDICINE

## 2024-01-27 PROCEDURE — 80053 COMPREHEN METABOLIC PANEL: CPT | Performed by: EMERGENCY MEDICINE

## 2024-01-27 RX ORDER — ACETAMINOPHEN 325 MG/1
650 TABLET ORAL EVERY 4 HOURS PRN
Status: DISCONTINUED | OUTPATIENT
Start: 2024-01-27 | End: 2024-02-06 | Stop reason: HOSPADM

## 2024-01-27 RX ORDER — IOPAMIDOL 755 MG/ML
83 INJECTION, SOLUTION INTRAVASCULAR ONCE
Status: COMPLETED | OUTPATIENT
Start: 2024-01-27 | End: 2024-01-27

## 2024-01-27 RX ORDER — CALCIUM CARBONATE 500 MG/1
1000 TABLET, CHEWABLE ORAL 4 TIMES DAILY PRN
Status: DISCONTINUED | OUTPATIENT
Start: 2024-01-27 | End: 2024-02-06 | Stop reason: HOSPADM

## 2024-01-27 RX ORDER — ONDANSETRON 2 MG/ML
4 INJECTION INTRAMUSCULAR; INTRAVENOUS ONCE
Status: COMPLETED | OUTPATIENT
Start: 2024-01-27 | End: 2024-01-27

## 2024-01-27 RX ORDER — LEVOTHYROXINE SODIUM 125 UG/1
125 TABLET ORAL ONCE
Status: COMPLETED | OUTPATIENT
Start: 2024-01-27 | End: 2024-01-27

## 2024-01-27 RX ORDER — ONDANSETRON 2 MG/ML
INJECTION INTRAMUSCULAR; INTRAVENOUS
Status: COMPLETED
Start: 2024-01-27 | End: 2024-01-27

## 2024-01-27 RX ORDER — AMOXICILLIN 250 MG
1 CAPSULE ORAL 2 TIMES DAILY PRN
Status: DISCONTINUED | OUTPATIENT
Start: 2024-01-27 | End: 2024-02-06 | Stop reason: HOSPADM

## 2024-01-27 RX ORDER — ONDANSETRON 2 MG/ML
4 INJECTION INTRAMUSCULAR; INTRAVENOUS EVERY 30 MIN PRN
Status: DISCONTINUED | OUTPATIENT
Start: 2024-01-27 | End: 2024-01-27

## 2024-01-27 RX ORDER — ASPIRIN 81 MG/1
324 TABLET, CHEWABLE ORAL ONCE
Status: COMPLETED | OUTPATIENT
Start: 2024-01-27 | End: 2024-01-27

## 2024-01-27 RX ORDER — AMOXICILLIN 250 MG
2 CAPSULE ORAL 2 TIMES DAILY PRN
Status: DISCONTINUED | OUTPATIENT
Start: 2024-01-27 | End: 2024-02-06 | Stop reason: HOSPADM

## 2024-01-27 RX ORDER — LEVOTHYROXINE SODIUM 125 UG/1
125 TABLET ORAL
Status: DISCONTINUED | OUTPATIENT
Start: 2024-01-28 | End: 2024-01-28

## 2024-01-27 RX ORDER — ADENOSINE 3 MG/ML
INJECTION, SOLUTION INTRAVENOUS
Status: COMPLETED
Start: 2024-01-27 | End: 2024-01-27

## 2024-01-27 RX ORDER — ADENOSINE 3 MG/ML
6 INJECTION, SOLUTION INTRAVENOUS ONCE
Status: COMPLETED | OUTPATIENT
Start: 2024-01-27 | End: 2024-01-27

## 2024-01-27 RX ORDER — ACETAMINOPHEN 650 MG/1
650 SUPPOSITORY RECTAL EVERY 4 HOURS PRN
Status: DISCONTINUED | OUTPATIENT
Start: 2024-01-27 | End: 2024-02-06 | Stop reason: HOSPADM

## 2024-01-27 RX ORDER — ONDANSETRON 4 MG/1
4 TABLET, ORALLY DISINTEGRATING ORAL EVERY 6 HOURS PRN
Status: DISCONTINUED | OUTPATIENT
Start: 2024-01-27 | End: 2024-02-06 | Stop reason: HOSPADM

## 2024-01-27 RX ORDER — ADENOSINE 3 MG/ML
12 INJECTION, SOLUTION INTRAVENOUS ONCE
Status: COMPLETED | OUTPATIENT
Start: 2024-01-27 | End: 2024-01-27

## 2024-01-27 RX ORDER — LIDOCAINE 40 MG/G
CREAM TOPICAL
Status: DISCONTINUED | OUTPATIENT
Start: 2024-01-27 | End: 2024-01-29

## 2024-01-27 RX ORDER — ONDANSETRON 2 MG/ML
4 INJECTION INTRAMUSCULAR; INTRAVENOUS EVERY 6 HOURS PRN
Status: DISCONTINUED | OUTPATIENT
Start: 2024-01-27 | End: 2024-02-06 | Stop reason: HOSPADM

## 2024-01-27 RX ADMIN — ADENOSINE 12 MG: 3 INJECTION, SOLUTION INTRAVENOUS at 05:09

## 2024-01-27 RX ADMIN — ASPIRIN 81 MG CHEWABLE TABLET 324 MG: 81 TABLET CHEWABLE at 09:34

## 2024-01-27 RX ADMIN — SODIUM CHLORIDE, POTASSIUM CHLORIDE, SODIUM LACTATE AND CALCIUM CHLORIDE 1000 ML: 600; 310; 30; 20 INJECTION, SOLUTION INTRAVENOUS at 04:55

## 2024-01-27 RX ADMIN — IOPAMIDOL 83 ML: 755 INJECTION, SOLUTION INTRAVENOUS at 10:42

## 2024-01-27 RX ADMIN — ONDANSETRON 4 MG: 2 INJECTION INTRAMUSCULAR; INTRAVENOUS at 04:50

## 2024-01-27 RX ADMIN — MICONAZOLE NITRATE: 2 POWDER TOPICAL at 20:03

## 2024-01-27 RX ADMIN — ADENOSINE 6 MG: 3 INJECTION, SOLUTION INTRAVENOUS at 05:08

## 2024-01-27 RX ADMIN — SODIUM CHLORIDE 99 ML: 9 INJECTION, SOLUTION INTRAVENOUS at 10:43

## 2024-01-27 RX ADMIN — LEVOTHYROXINE SODIUM 125 MCG: 125 TABLET ORAL at 09:34

## 2024-01-27 RX ADMIN — ADENOSINE 6 MG: 3 INJECTION INTRAVENOUS at 05:08

## 2024-01-27 RX ADMIN — ACETAMINOPHEN 650 MG: 325 TABLET, FILM COATED ORAL at 22:54

## 2024-01-27 RX ADMIN — ADENOSINE 12 MG: 3 INJECTION INTRAVENOUS at 05:09

## 2024-01-27 ASSESSMENT — ACTIVITIES OF DAILY LIVING (ADL)
ADLS_ACUITY_SCORE: 25
ADLS_ACUITY_SCORE: 33
ADLS_ACUITY_SCORE: 35
ADLS_ACUITY_SCORE: 25
ADLS_ACUITY_SCORE: 22
ADLS_ACUITY_SCORE: 35
ADLS_ACUITY_SCORE: 25
ADLS_ACUITY_SCORE: 22

## 2024-01-27 NOTE — ED NOTES
Pt up for ambulation trial. Required walker and assistance from writer and her  to get out of bed. Walked to the other side of her room and back twice reporting increased shortness of breath and fatigue. Difficult to get a good portable oximeter reading on her but back on wall monitor was 92% on room air after getting back in bed with pulse remaining in the 70s-80s.

## 2024-01-27 NOTE — ED TRIAGE NOTES
Patient lives at home with . She has been having some soft stools the last couple of days. She has very dizzy tonight at home and fell at least two times. Heart rate in 160s per EMS with SBPs in the 80s. She has not gone to the doctor in a couple of years. Vomiting upon arrival

## 2024-01-27 NOTE — ED PROVIDER NOTES
History     Chief Complaint:  Fall and Dizziness       HPI   Yodit Harvey is a 43 year old female who presents with 2 falls today. Patient reports while walking to the bathroom today she tripped over laundry baskets and fell. She did not hit her head but did require her  to help her up. While getting up she became dizziness, but returned to her chair. Shortly after this she got up again with her 's help to go the bathroom. She became dizzy again half way to the bathroom but her  helped her down before she fell. Patient's  then called EMS. Her heart rate was 177 and had 93% O2 with EMS. Here in the ED she has had nausea and vomiting and reports a week of worsening weakness. Denies cough, fever, or any pain. She is noted to have history of hypothymism and obesity. No drug allergies.     Independent Historian:   Spouse/Partner - They report she fell twice tonight    Review of External Notes:   OBGYN note 23 discussing weight gain and need to follow up      Medications:    Synthroid     Past Medical History:    Hypothyroidism, unspecified hypothyroidism type  Menorrhagia with regular cycle  Morbid obesity, unspecified obesity type    Past Surgical History:    IUD   section  Tympanostomy     Physical Exam   Patient Vitals for the past 24 hrs:   BP Temp Temp src Pulse Resp SpO2 Weight   24 0859 -- -- -- 64 (!) 9 96 % --   24 0843 -- -- -- 78 -- (!) 86 % --   24 0815 120/73 -- -- 75 17 99 % --   24 0800 119/63 -- -- 75 12 97 % --   24 0733 -- -- -- 77 24 97 % --   24 0730 106/71 -- -- -- -- -- --   24 0713 118/80 -- -- 82 17 92 % --   24 0711 90/56 -- -- 79 -- -- --   24 0645 108/60 -- -- 74 25 96 % --   24 0630 103/64 -- -- 78 22 98 % --   24 0620 -- -- -- 81 14 94 % --   24 0619 97/60 -- -- 83 16 -- --   24 0615 104/66 -- -- 80 26 98 % --   24 0545 -- -- -- 84 18 97 % --   24 0459 --  97.6  F (36.4  C) Oral -- -- -- --   01/27/24 0458 -- -- -- -- -- -- (!) 224 kg (493 lb 13.3 oz)   01/27/24 0453 106/76 -- -- (!) 179 22 93 % --        Physical Exam  Head: No signs of trauma.   Mouth/Throat: Oropharynx is clear and moist.   Neck: Normal range of motion.   CV: Significant tachycadia  Resp: Effort normal and breath sounds normal. No respiratory distress.   GI: Soft. There is no tenderness.  No rebound or guarding.  Normal bowel sounds.    MSK: Normal range of motion.   Neuro: The patient is alert and oriented to person, place, and time.  PERRLA, EOMI, strength in upper/lower extremities normal and symmetrical. Sensation normal. Speech normal.  Skin: Skin is warm and dry. No rash noted.   Psych: Appears anxious      Emergency Department Course   ECG  ECG results from 01/27/24   EKG 12-lead, tracing only     Value    Systolic Blood Pressure     Diastolic Blood Pressure     Ventricular Rate 178    Atrial Rate     UT Interval     QRS Duration 60        QTc 447    P Axis     R AXIS 118    T Axis -85    Interpretation ECG      ** Critical Test Result: High HR  Supraventricular tachycardia  Right axis deviation  Low voltage QRS  ST & T wave abnormality, consider inferior ischemia  Abnormal ECG  No previous ECGs available  Confirmed by GENERATED REPORT, COMPUTER ([id number]),  Darlyn Lugo ([id number]) on 1/27/2024 5:00:33 AM     EKG 12 lead     Value    Systolic Blood Pressure     Diastolic Blood Pressure     Ventricular Rate 96    Atrial Rate 96    UT Interval 160    QRS Duration 70        QTc 414    P Axis 71    R AXIS 112    T Axis -6    Interpretation ECG      Sinus rhythm  Low voltage QRS  Left posterior fascicular block  Nonspecific T wave abnormality  Abnormal ECG  When compared with ECG of 27-JAN-2024 04:54,  Vent. rate has decreased BY  82 BPM  T wave inversion no longer evident in Inferior leads  Nonspecific T wave abnormality has replaced inverted T waves in Lateral  leads  Confirmed by GENERATED REPORT, COMPUTER ([id number]),  Darlyn Lugo ([id number]) on 1/27/2024 5:14:38 AM          Imaging:  Chest XR,  PA & LAT   Final Result   IMPRESSION: Lateral view is underpenetrated due to body habitus.      Lungs are clear. No signs of pneumonia or failure. Heart is of normal size. No effusions.      XR Ankle Left G/E 3 Views   Final Result   IMPRESSION: Soft tissue edema. No visible fracture or dislocation. Plantar calcaneal spur. Several corticated calcifications plantar aspect of the midfoot presumably chronic.      CT Chest Pulmonary Embolism w Contrast    (Results Pending)          Laboratory:  Labs Ordered and Resulted from Time of ED Arrival to Time of ED Departure   COMPREHENSIVE METABOLIC PANEL - Abnormal       Result Value    Sodium 139      Potassium 4.2      Carbon Dioxide (CO2) 27      Anion Gap 14      Urea Nitrogen 16.1      Creatinine 1.47 (*)     GFR Estimate 44 (*)     Calcium 9.3      Chloride 98      Glucose 158 (*)     Alkaline Phosphatase 51      AST 37      ALT 27      Protein Total 7.6      Albumin 4.2      Bilirubin Total 0.3     TROPONIN T, HIGH SENSITIVITY - Abnormal    Troponin T, High Sensitivity 65 (*)    TSH WITH FREE T4 REFLEX - Abnormal    .90 (*)    CBC WITH PLATELETS AND DIFFERENTIAL - Abnormal    WBC Count 11.0      RBC Count 3.41 (*)     Hemoglobin 10.6 (*)     Hematocrit 34.3 (*)      (*)     MCH 31.1      MCHC 30.9 (*)     RDW 15.7 (*)     Platelet Count 234      % Neutrophils 76      % Lymphocytes 15      % Monocytes 3      % Eosinophils 2      % Basophils 1      % Immature Granulocytes 3      NRBCs per 100 WBC 0      Absolute Neutrophils 8.4 (*)     Absolute Lymphocytes 1.6      Absolute Monocytes 0.3      Absolute Eosinophils 0.2      Absolute Basophils 0.1      Absolute Immature Granulocytes 0.3      Absolute NRBCs 0.0     TROPONIN T, HIGH SENSITIVITY - Abnormal    Troponin T, High Sensitivity 95 (*)    T4 FREE - Abnormal     Free T4 0.18 (*)    BLOOD GAS VENOUS - Abnormal    pH Venous 7.34      pCO2 Venous 62 (*)     pO2 Venous 19 (*)     Bicarbonate Venous 33 (*)     Base Excess/Deficit Venous 6.0 (*)     FIO2 2      Oxyhemoglobin Venous 21 (*)     O2 Sat, Venous 21.3 (*)    ETHYL ALCOHOL LEVEL - Normal    Alcohol ethyl <0.01     HCG QUALITATIVE PREGNANCY - Normal    hCG Serum Qualitative Negative     D DIMER QUANTITATIVE - Normal    D-Dimer Quantitative 0.38     MAGNESIUM - Normal    Magnesium 2.0     EXTRA PURPLE TOP ON ICE    Hold Specimen            Emergency Department Course & Assessments:  Interventions:  Medications   ondansetron (ZOFRAN) injection 4 mg (has no administration in time range)   levothyroxine (SYNTHROID/LEVOTHROID) tablet 125 mcg (has no administration in time range)   lactated ringers BOLUS 1,000 mL (0 mLs Intravenous Stopped 1/27/24 0615)   ondansetron (ZOFRAN) injection 4 mg (4 mg Intravenous $Given 1/27/24 0450)   adenosine (ADENOCARD) injection 6 mg (6 mg Intravenous $Given 1/27/24 0508)   adenosine (ADENOCARD) injection 12 mg (12 mg Intravenous $Given 1/27/24 0509)        Assessments:  0446 I obtained history and examined the patient as noted above.     Independent Interpretation (X-rays, CTs, rhythm strip):  On my independent interpretation of CXR there is no pneumothorax      Consultations/Discussion of Management or Tests:  Dr. Verma, Hospitalist, for admission    Social Determinants of Health affecting care:   Healthcare Access/Compliance    Disposition:  The patient was admitted to the hospital under the care of Dr. Verma.     Impression & Plan    Medical Decision Making:  Yodit Hravey presents due to dizziness, and not feeling well.  She had a fall earlier in the evening.  Later she tried to get up with her , and got quite lightheaded and dizzy and had to sit on the ground.  EMS was contacted and the patient was found to be significantly tachycardic.  On arrival an EKG was obtained that  showed SVT.  Patient appeared in significant distress and overall had poor perfusion.  Pacer pads were placed and the patient was given adenosine.  With this we were able to break the SVT and she returned to a normal heart rhythm.  She felt significantly better with this.  Given the initial presentation, I had done a broad workup including troponin.  Patient's troponin did come back elevated, which would be expected given the significant elevation of her heart rate.  Patient was not having continued chest pain after the return to normal sinus rhythm.  She was feeling somewhat short of breath and was mildly hypoxic.  D-dimer had been obtained that was normal.  Chest x-ray did not show any signs of infiltrate or effusions or other acute process.  We attempted to have the patient ambulate, and she continued to be short of breath and felt quite weak and had difficulty ambulating any significant distance.  Because of the shortness of breath is not clear, the patient will be admitted for continued monitoring and evaluation.  With the hypoxia, I did order a CT scan despite the negative D-dimer.    Critical Care Time:  35 minutes excluding procedures    Diagnosis:    ICD-10-CM    1. SVT (supraventricular tachycardia)  I47.10       2. Hypoxia  R09.02       3. Hypothyroidism, unspecified type  E03.9                  Scribe Disclosure:  I, Crow Burnham, am serving as a scribe at 5:17 AM on 1/27/2024 to document services personally performed by Fei Inman MD based on my observations and the provider's statements to me.   1/27/2024   Fei Inman MD [place]stal, John A, MD  01/27/24 0913

## 2024-01-27 NOTE — ED NOTES
Writer spoke to Dr. Verma regarding bed assignment, pt does not need IMC at this time, general medicine bed order placed per verbal order

## 2024-01-27 NOTE — CONSULTS
[facility]    Cardiology Consultation    Date of Admission:  1/27/2024  Date of Service: 01/27/24    Reason for Consult   Reason for consult: I was asked by primary service to evaluate this patient for troponin elevation.    History of Present Illness   Yodit Harvey is a 43 year old female who presents with falls found to have SVT.  Past medical history includes hypothyroidism, obesity, macrocytic anemia.    The patient presented today to emergency department after experiencing lightheadedness, which occurred after she stood up from a mechanical fall twice.  There is question of if she actually lost consciousness during 1 of these episodes.      In the ED, ECG revealed supraventricular tachycardia for which the patient received adenosine x 2 with conversion to normal sinus rhythm.  She was admitted for further evaluation.  Labs on admission revealed troponin 65 -> 95 -> 170.  , T4 0.18.  Repeat ECG revealed sinus rhythm, left posterior fascicular block.  CT chest revealed no pulmonary embolism or coronary artery calcifications.  Chest x-ray revealed normal cardiac silhouette, no pleural effusions.  Given the troponin elevation, cardiology has been consulted for further recommendations.    The patient reports developing exertional dyspnea that has been getting progressively worse over the last several years.  She reports maybe around 2019 developing lower extremity edema that subsequently led to leg pain which led to deconditioning and further weight gain.  She denies any history of chest pain, or palpitations, or any other syncopal episodes.    The patient is a lifelong non-smoker, does not drink.  She does report family history of valve disease in a relative, but no history of coronary artery disease.    Assessment & Plan   Yodit Harvey is a 43 year old female who was admitted on 1/27/2024.    #1 Supraventricular tachycardia  #2  Troponin elevation likely secondary to  #1  #3  Obesity  #4  Possible HFpEF  #5  Profound hypothyroidism    Overall, the patient presents with symptomatic lightheadedness in the setting of SVT in the setting of profound hypothyroidism.  I wonder if her abnormal thyroid labs are causative not only of her SVT but also her leg edema that she has been dealing with.  Differential also includes heart failure with preserved ejection fraction/high-output heart failure in the setting of her obesity.  Bedside ultrasound unable to be performed due to technical issues with my probe.  I discussed with the patient that given her lack of concerning cardiorespiratory symptoms I am not suspicious for ACS.  Though I do not think her symptoms are secondary to ischemia, given her progressive exertional dyspnea, risk factors including obesity and hyperlipidemia, as well as secondhand smoke exposure it is reasonable to recheck another troponin tonight.  It may continue to uptrend but so long as were not seeing drastic increases I would not treat for ACS.       I do think she may have a component of heart failure as mentioned, and thus getting echocardiogram for further evaluation would be reasonable.  I think gentle diuresis could be considered but I would hold off for now given her creatinine 1.47 until further evaluation.  I think her largest issue is probably her significantly abnormal thyroid labs for which she needs appropriate dosing of her thyroid medication    Plan:  -Repeat troponin at 7 PM. Troponin trended down to 140s. No need to repeat.  -Uptitrate thyroid medication to treat her hypothyroidism  - cardiac telemetry reasonable for now  - if patient experiences recurrent SVT, recommend vagal maneuvers followed by adenosine PRN (x2)  -Agree with echocardiogram    Thank for the consult.  Please reach out with any questions or concerns.    Ankush Faye MD    Primary Care Physician   Lexi Salazar    Patient Active Problem List   Diagnosis    Menorrhagia with regular  cycle    Morbid obesity, unspecified obesity type (H)    Family history of ovarian cancer    Hypothyroidism    Morbid obesity with BMI of 50.0-59.9, adult (H)    Presence of intrauterine contraceptive device (IUD)    Genital herpes    SVT (supraventricular tachycardia)    Hypoxia       Past Medical History   I have reviewed this patient's medical history and updated it with pertinent information if needed.   Past Medical History:   Diagnosis Date    Family history of ovarian cancer 3/3/2016    Genital herpes 05/15/2019    first outbreak.  with known cold sores for years and PCR pos for type I and antibody just barely positive at first outbreak with known exposure about 3-4 weeks prior to her outbreak    Hypothyroidism, unspecified hypothyroidism type [E03.9] 3/3/2016    Menorrhagia with regular cycle 3/3/2016    Morbid obesity, unspecified obesity type (H) 3/3/2016    Presence of intrauterine contraceptive device (IUD) 2019    mirena placed by [facility]/S confirmation at [facility] immediately after with correct placement       Past Surgical History   I have reviewed this patient's surgical history and updated it with pertinent information if needed.  Past Surgical History:   Procedure Laterality Date     SECTION  12/10/08, 11    HC CREATE EARDRUM OPENING,GEN ANESTH      HC TOOTH EXTRACTION W/FORCEP         Prior to Admission Medications   Prior to Admission Medications   Prescriptions Last Dose Informant Patient Reported? Taking?   levonorgestrel (MIRENA) 20 MCG/24HR IUD  Self Yes Yes   Si each by Intrauterine route once - inserted 19 FCFW   levothyroxine (SYNTHROID/LEVOTHROID) 125 MCG tablet more than a year Self No Yes   Sig: Take 2 tablets (250 mcg) by mouth daily      Facility-Administered Medications: None     Current Facility-Administered Medications   Medication Dose Route Frequency    [START ON 2024] levothyroxine  125 mcg Oral QAM AC    miconazole   Topical BID    sodium  chloride (PF)  3 mL Intracatheter Q8H     Current Facility-Administered Medications   Medication Last Rate     Allergies   No Known Allergies    Social History    reports that she has never smoked. She has never used smokeless tobacco. She reports current alcohol use. She reports that she does not use drugs.    Family History   Family History   Problem Relation Age of Onset    Breast Cancer Maternal Grandmother         2013    Uterine Cancer Mother 48         age 48    Thyroid Disease Sister        Review of Systems   The comprehensive 10 point Review of Systems is negative other than noted in the HPI or here.     Physical Exam   Vital Signs with Ranges  Temp:  [97.6  F (36.4  C)-98.3  F (36.8  C)] 98.3  F (36.8  C)  Pulse:  [] 65  Resp:  [9-26] 20  BP: ()/(56-80) 108/76  SpO2:  [86 %-99 %] 93 %  Wt Readings from Last 4 Encounters:   24 (!) 224 kg (493 lb 13.3 oz)   19 (!) 150.4 kg (331 lb 9.6 oz)   05/15/19 149.1 kg (328 lb 12.8 oz)   04/10/19 (!) 153.4 kg (338 lb 3.2 oz)     No intake/output data recorded.      Vitals: /76   Pulse 65   Temp 98.3  F (36.8  C) (Oral)   Resp 20   Wt (!) 224 kg (493 lb 13.3 oz)   SpO2 93%   BMI 82.18 kg/m      General: Alert, oriented, in no acute distress  HEENT: PERRLA, mucous membranes moist  Neck: JVP difficult to assess  CV: Regular rate and rhythm without murmur  Respiratory: Breathing comfortably on nasal cannula, no audible crackles or coarse breath sounds  GI: Normoactive bowel sounds  Neuro: No focal deficits appreciated  Extremities bilateral nonpitting lower extremity edema    Recent Labs   Lab 24  0450   WBC 11.0   HGB 10.6*   *         POTASSIUM 4.2   CHLORIDE 98   CO2 27   BUN 16.1   CR 1.47*   GFRESTIMATED 44*   ANIONGAP 14   BECKY 9.3   *   ALBUMIN 4.2   PROTTOTAL 7.6   BILITOTAL 0.3   ALKPHOS 51   ALT 27   AST 37     Recent Labs   Lab Test 16  1014   CHOL 215*   HDL 51   *  "  TRIG 110     Recent Labs   Lab 01/27/24  1516 01/27/24  0450   WBC  --  11.0   HGB  --  10.6*   HCT  --  34.3*   MCV  --  101*   PLT  --  234   IRON 49  --    IRONSAT 16  --      --      Recent Labs   Lab 01/27/24  0858   PHV 7.34   PO2V 19*   PCO2V 62*   HCO3V 33*     Recent Labs   Lab 01/27/24  1516   NTBNPI 1,199*     Recent Labs   Lab 01/27/24  0457   DD 0.38     No results for input(s): \"SED\", \"CRP\" in the last 168 hours.  Recent Labs   Lab 01/27/24  0450        Recent Labs   Lab 01/27/24  0450   .90*     No results for input(s): \"COLOR\", \"APPEARANCE\", \"URINEGLC\", \"URINEBILI\", \"URINEKETONE\", \"SG\", \"UBLD\", \"URINEPH\", \"PROTEIN\", \"UROBILINOGEN\", \"NITRITE\", \"LEUKEST\", \"RBCU\", \"WBCU\" in the last 168 hours.    Imaging:  Recent Results (from the past 48 hour(s))   XR Ankle Left G/E 3 Views    Narrative    EXAM: XR ANKLE LEFT G/E 3 VIEWS  LOCATION: [facility]  DATE: 1/27/2024    INDICATION: Fall. Pain  COMPARISON: None.      Impression    IMPRESSION: Soft tissue edema. No visible fracture or dislocation. Plantar calcaneal spur. Several corticated calcifications plantar aspect of the midfoot presumably chronic.   Chest XR,  PA & LAT    Narrative    EXAM: XR CHEST 2 VIEWS  LOCATION: [facility]  DATE: 1/27/2024    INDICATION: SOB  COMPARISON: None.      Impression    IMPRESSION: Lateral view is underpenetrated due to body habitus.    Lungs are clear. No signs of pneumonia or failure. Heart is of normal size. No effusions.   CT Chest Pulmonary Embolism w Contrast    Narrative    EXAM: CT CHEST PULMONARY EMBOLISM W CONTRAST  LOCATION: [facility]  DATE: 1/27/2024    INDICATION: Hypoxia.  COMPARISON: None.  TECHNIQUE: CT chest pulmonary angiogram during arterial phase injection of IV contrast. Multiplanar reformats and MIP reconstructions were performed. Dose reduction techniques were used.   CONTRAST: 83mL Isovue " 370    FINDINGS:  ANGIOGRAM CHEST: Pulmonary arteries are normal caliber and negative for pulmonary emboli. Thoracic aorta is negative for dissection. No CT evidence of right heart strain.    LUNGS AND PLEURA: Mild dependent probable atelectasis versus less likely infiltrate. No effusions.    MEDIASTINUM/AXILLAE: No adenopathy. No aneurysm.    CORONARY ARTERY CALCIFICATION: None.    UPPER ABDOMEN: No acute findings.    MUSCULOSKELETAL: No frankly destructive bony lesions.      Impression    IMPRESSION:  1.  No pulmonary embolism demonstrated.  2.  Mild dependent probable atelectasis versus less likely infiltrate.         Medical Decision Making       45 MINUTES SPENT BY ME on the date of service doing chart review, history, exam, documentation & further activities per the note.

## 2024-01-27 NOTE — H&P
[facility]    History and Physical - Hospitalist Service       Date of Admission:  1/27/2024    Assessment & Plan      Yodit Harvey is a 43 year old female with past medical history significant for hypothyroidism and morbid obesity admitted on 1/27/2024 with dizziness and falls, found to be in SVT. She is also found to have mild hypoxia and compensated hypercapnia.    SVT   Dizziness, falls likely 2/2 above   Pt presented to the ED after two falls. The first fall she reports was a trip and fall, however when getting up she started feeling dizzy so sat down. When she got up she again started to feel dizzy and was assisted to the ground by her . She thinks though that she might have passed out briefly during the second fall. When EMS arrived her HR was 177.   * EKG in the ED showed SVT. She was given adenosine x2 and she eventually returned to a normal sinus rhythm.   - Cardiac Monitoring   - TTE   - Hold off on beta blocker or other agent for now given BP and HR are on the lower side (unclear how accurate BPs are - checking on forearm).   - Recommend ambulatory cardiac monitoring at discharge  - Cardiology consulted     Troponin elevation   Troponin elevated on admission 65->95. Suspect demand ischemia in the setting of SVT. No prior known hx of CAD.   - Continue to trend troponin   - TTE and Cardiology consult as noted above     Hypoxemia   Hypercapnia- suspect chronic, likely OHS and JL   Pt continue to endorse some shortness of breath following conversion to NSR. O2 sats in the ED down to mid 80s at times. VBG showed elevated pCO2 to 62 but with a normal pH, so this is presumed chronic.   * CXR was unremarkable but limited due to body habitus.   * CTPE negative for pulmonary embolism. Mild dependent probable atelectasis vs less likely infiltrate.   - Suspect the mild hypoxia and hypercapnia is chronic and related to obesity.   - Continuous oximetry, supplemental O2 as needed    - Incentive spirometry   - Monitor VBG  - Recommend outpatient sleep study     Elevated creatinine   Creatinine on admission is 1.47. No prior creatinine on file.   - Monitor renal function.   - Avoid nephrotoxins as able.     Uncontrolled hypothyroidism   Pt with known hx of hypothyroidism. PTA is on levothyroxine 250mcg daily, but has not been taking for years and prior to that was only intermittently taking  * TSH is 144 and T4 is 0.18   - Resume levothyroxine at 125mcg for now     Mild macrocytic anemia   Hemoglobin is 10.6, again no prior on record. Possibly 2/2 hypothyroidism   - Check Iron studies and B12 levels     Hyperglycemia  - Check hemoglobin A1C     Morbid obesity   Body mass index is 82.18 kg/m .  Pt reports she has gained over 200lbs since the start of COVID.   - Discussed briefly recommendations for weight loss   - Treating hypothyroidism should help   - Would also recommend referral to endocrinology or comprehensive weight management clinic.       Diet: Combination Diet Regular Diet Adult    DVT Prophylaxis: Pneumatic Compression Devices  Gonzales Catheter: Not present  Lines: None     Cardiac Monitoring: ACTIVE order. Indication: Tachyarrhythmias, acute (48 hours)  Code Status: Full Code      Clinically Significant Risk Factors Present on Admission                                  Disposition Plan      Expected Discharge Date: 01/29/2024      Destination: home            Sudha Verma MD  Hospitalist Service  [facility]  Securely message with Aframe (more info)  Text page via [employer] Paging/Directory     ______________________________________________________________________    Chief Complaint   Dizziness, Falls     History is obtained from the patient    History of Present Illness   Yodit Harvey is a 43 year old female who presents to the ED with dizziness and falls.     Pt presented to the ED after two falls. The first fall she reports was a trip and fall,  however when getting up she started feeling dizzy so sat down. When she got up she again started to feel dizzy and was assisted to the ground by her . She thinks though that she might have passed out briefly during the second fall. She did not feel her heart racing or any palpitations. She denies chest pain. She notes getting short of breath with exertion, but is not currently short of breath at rest.   She is worried about fluid retention. She sleeps upright in a recliner and notes that she has a hard time lying flat. She has never been tested for sleep apnea. She thinks that she gained about 200lbs since the start of COVID. She has not been taking her levothyroxine, but cannot really state why.       Past Medical History    Past Medical History:   Diagnosis Date    Family history of ovarian cancer 3/3/2016    Genital herpes 05/15/2019    first outbreak.  with known cold sores for years and PCR pos for type I and antibody just barely positive at first outbreak with known exposure about 3-4 weeks prior to her outbreak    Hypothyroidism, unspecified hypothyroidism type [E03.9] 3/3/2016    Menorrhagia with regular cycle 3/3/2016    Morbid obesity, unspecified obesity type (H) 3/3/2016    Presence of intrauterine contraceptive device (IUD) 2019    mirena placed by [facility]/S confirmation at [facility] immediately after with correct placement       Past Surgical History   Past Surgical History:   Procedure Laterality Date     SECTION  12/10/08, 11    HC CREATE EARDRUM OPENING,GEN ANESTH      HC TOOTH EXTRACTION W/FORCEP         Prior to Admission Medications   Prior to Admission Medications   Prescriptions Last Dose Informant Patient Reported? Taking?   levonorgestrel (MIRENA) 20 MCG/24HR IUD  Self Yes Yes   Si each by Intrauterine route once - inserted 19 FCFW   levothyroxine (SYNTHROID/LEVOTHROID) 125 MCG tablet more than a year Self No Yes   Sig: Take 2 tablets (250 mcg) by mouth  daily      Facility-Administered Medications: None        Review of Systems    The 10 point Review of Systems is negative other than noted in the HPI or here.     Physical Exam   Vital Signs: Temp: 98.3  F (36.8  C) Temp src: Oral BP: 104/59 Pulse: 65   Resp: 20 SpO2: 98 % O2 Device: Nasal cannula Oxygen Delivery: 2 LPM  Weight: 493 lbs 13.29 oz    Constitutional: Awake, alert, cooperative, no apparent distress.  Eyes: Conjunctiva and pupils examined and normal.  HEENT: dry mucous membranes, normal dentition.  Respiratory: Clear to auscultation bilaterally, no crackles or wheezing.  Cardiovascular: Regular rate and rhythm, normal S1 and S2, and no murmur noted.  GI: Soft, non-distended, non-tender, normal bowel sounds.  Skin: No rashes, no cyanosis, no edema. Very dry skin. Numerous skin tags   Musculoskeletal: No joint swelling, erythema or tenderness.  Neurologic: Cranial nerves 2-12 intact, normal strength and sensation.  Psychiatric: Alert, oriented to person, place and time, no obvious anxiety or depression.    Medical Decision Making       75 MINUTES SPENT BY ME on the date of service doing chart review, history, exam, documentation & further activities per the note.      Data     I have personally reviewed the following data over the past 24 hrs:    11.0  \   10.6 (L)   / 234     139 98 16.1 /  158 (H)   4.2 27 1.47 (H) \     ALT: 27 AST: 37 AP: 51 TBILI: 0.3   ALB: 4.2 TOT PROTEIN: 7.6 LIPASE: N/A     Trop: 95 (H) BNP: N/A     TSH: 144.90 (H) T4: 0.18 (L) A1C: N/A     INR:  N/A PTT:  N/A   D-dimer:  0.38 Fibrinogen:  N/A       Imaging results reviewed over the past 24 hrs:   Recent Results (from the past 24 hour(s))   XR Ankle Left G/E 3 Views    Narrative    EXAM: XR ANKLE LEFT G/E 3 VIEWS  LOCATION: [facility]  DATE: 1/27/2024    INDICATION: Fall. Pain  COMPARISON: None.      Impression    IMPRESSION: Soft tissue edema. No visible fracture or dislocation. Plantar calcaneal spur.  Several corticated calcifications plantar aspect of the midfoot presumably chronic.   Chest XR,  PA & LAT    Narrative    EXAM: XR CHEST 2 VIEWS  LOCATION: [facility]  DATE: 1/27/2024    INDICATION: SOB  COMPARISON: None.      Impression    IMPRESSION: Lateral view is underpenetrated due to body habitus.    Lungs are clear. No signs of pneumonia or failure. Heart is of normal size. No effusions.   CT Chest Pulmonary Embolism w Contrast    Narrative    EXAM: CT CHEST PULMONARY EMBOLISM W CONTRAST  LOCATION: [facility]  DATE: 1/27/2024    INDICATION: Hypoxia.  COMPARISON: None.  TECHNIQUE: CT chest pulmonary angiogram during arterial phase injection of IV contrast. Multiplanar reformats and MIP reconstructions were performed. Dose reduction techniques were used.   CONTRAST: 83mL Isovue 370    FINDINGS:  ANGIOGRAM CHEST: Pulmonary arteries are normal caliber and negative for pulmonary emboli. Thoracic aorta is negative for dissection. No CT evidence of right heart strain.    LUNGS AND PLEURA: Mild dependent probable atelectasis versus less likely infiltrate. No effusions.    MEDIASTINUM/AXILLAE: No adenopathy. No aneurysm.    CORONARY ARTERY CALCIFICATION: None.    UPPER ABDOMEN: No acute findings.    MUSCULOSKELETAL: No frankly destructive bony lesions.      Impression    IMPRESSION:  1.  No pulmonary embolism demonstrated.  2.  Mild dependent probable atelectasis versus less likely infiltrate.

## 2024-01-27 NOTE — PROGRESS NOTES
RECEIVING UNIT ED HANDOFF REVIEW    ED Nurse Handoff Report was reviewed by: Cadence Can RN on January 27, 2024 at 11:04 AM

## 2024-01-27 NOTE — ED NOTES
[facility]  ED Nurse Handoff Report    ED Chief complaint: Fall and Dizziness      ED Diagnosis:   Final diagnoses:   SVT (supraventricular tachycardia)   Hypoxia   Hypothyroidism, unspecified type       Code Status: To be addressed by admitting MD.     Allergies: No Known Allergies    Patient Story: Presents to the ED in SVT, converted with adenosine, reporting SOB with exertion and position  Focused Assessment:  Hypoxia on room air at 86%, on 2L via NC, in NSR in the 70s, Alert and oriented x4    Treatments and/or interventions provided:   Chest XR,  PA & LAT   Final Result   IMPRESSION: Lateral view is underpenetrated due to body habitus.      Lungs are clear. No signs of pneumonia or failure. Heart is of normal size. No effusions.      XR Ankle Left G/E 3 Views   Final Result   IMPRESSION: Soft tissue edema. No visible fracture or dislocation. Plantar calcaneal spur. Several corticated calcifications plantar aspect of the midfoot presumably chronic.      CT Chest Pulmonary Embolism w Contrast    (Results Pending)     Medications   ondansetron (ZOFRAN) injection 4 mg (has no administration in time range)   iopamidol (ISOVUE-370) solution 83 mL (has no administration in time range)   Saline (has no administration in time range)   lactated ringers BOLUS 1,000 mL (0 mLs Intravenous Stopped 1/27/24 0628)   ondansetron (ZOFRAN) injection 4 mg (4 mg Intravenous $Given 1/27/24 0450)   adenosine (ADENOCARD) injection 6 mg (6 mg Intravenous $Given 1/27/24 0508)   adenosine (ADENOCARD) injection 12 mg (12 mg Intravenous $Given 1/27/24 0509)   levothyroxine (SYNTHROID/LEVOTHROID) tablet 125 mcg (125 mcg Oral $Given 1/27/24 0934)   aspirin (ASA) chewable tablet 324 mg (324 mg Oral $Given 1/27/24 0934)       Patient's response to treatments and/or interventions: Tolerated well     To be done/followed up on inpatient unit:  Further evaluation    Does this patient have any cognitive concerns?:  NA     Activity  level - Baseline/Home:  Stand with Assist  Activity Level - Current:   Stand with Assist and Walker    Patient's Preferred language: English   Needed?: No    Isolation: None  Infection: Not Applicable  Patient tested for COVID 19 prior to admission:   Bariatric?: Yes    Vital Signs:   Vitals:    01/27/24 0858 01/27/24 0859 01/27/24 0919 01/27/24 0946   BP: 113/72  106/64 113/58   Pulse: 66 64 67 69   Resp: 10 (!) 9 16 19   Temp:       TempSrc:       SpO2: 96% 96% 91% 95%   Weight:           Cardiac Rhythm:Cardiac Rhythm: Normal sinus rhythm    Was the PSS-3 completed:   Yes  What interventions are required if any?               Family Comments:  at bedside in ED.   OBS brochure/video discussed/provided to patient/family: N/A              Name of person given brochure if not patient: NA               Relationship to patient: NA     For the majority of the shift this patient's behavior was Green.   Behavioral interventions performed were frequent rounding in ED.    ED NURSE PHONE NUMBER: [phone]

## 2024-01-27 NOTE — PHARMACY-ADMISSION MEDICATION HISTORY
Pharmacy Intern Admission Medication History    Admission medication history is complete. The information provided in this note is only as accurate as the sources available at the time of the update.    Information Source(s): Patient and CareEverywhere/SureScripts via in-person    Pertinent Information:   - pt reports hasn't taken levothyroxine in approx. more than a year but notes that she should be taking and has no refills remaining    Changes made to PTA medication list:  Added: none  Deleted: phentermine   Changed: none    Allergies reviewed with patient and updates made in EHR: yes    Medication History Completed By: Syl Diamond 1/27/2024 9:48 AM    PTA Med List   Medication Sig Last Dose    levonorgestrel (MIRENA) 20 MCG/24HR IUD 1 each by Intrauterine route once - inserted 2/18/19 FCFW     levothyroxine (SYNTHROID/LEVOTHROID) 125 MCG tablet Take 2 tablets (250 mcg) by mouth daily more than a year

## 2024-01-28 ENCOUNTER — APPOINTMENT (OUTPATIENT)
Dept: CARDIOLOGY | Facility: CLINIC | Age: 44
End: 2024-01-28
Attending: STUDENT IN AN ORGANIZED HEALTH CARE EDUCATION/TRAINING PROGRAM
Payer: COMMERCIAL

## 2024-01-28 LAB
ANION GAP SERPL CALCULATED.3IONS-SCNC: 8 MMOL/L (ref 7–15)
BUN SERPL-MCNC: 14.1 MG/DL (ref 6–20)
C PNEUM DNA SPEC QL NAA+PROBE: NOT DETECTED
CALCIUM SERPL-MCNC: 9.5 MG/DL (ref 8.6–10)
CHLORIDE SERPL-SCNC: 100 MMOL/L (ref 98–107)
CREAT SERPL-MCNC: 0.98 MG/DL (ref 0.51–0.95)
DEPRECATED HCO3 PLAS-SCNC: 32 MMOL/L (ref 22–29)
EGFRCR SERPLBLD CKD-EPI 2021: 72 ML/MIN/1.73M2
ERYTHROCYTE [DISTWIDTH] IN BLOOD BY AUTOMATED COUNT: 16.1 % (ref 10–15)
FLUAV H1 2009 PAND RNA SPEC QL NAA+PROBE: NOT DETECTED
FLUAV H1 RNA SPEC QL NAA+PROBE: NOT DETECTED
FLUAV H3 RNA SPEC QL NAA+PROBE: NOT DETECTED
FLUAV RNA SPEC QL NAA+PROBE: NOT DETECTED
FLUBV RNA SPEC QL NAA+PROBE: NOT DETECTED
GLUCOSE SERPL-MCNC: 134 MG/DL (ref 70–99)
HADV DNA SPEC QL NAA+PROBE: NOT DETECTED
HCOV PNL SPEC NAA+PROBE: NOT DETECTED
HCT VFR BLD AUTO: 36.3 % (ref 35–47)
HGB BLD-MCNC: 10.8 G/DL (ref 11.7–15.7)
HMPV RNA SPEC QL NAA+PROBE: NOT DETECTED
HPIV1 RNA SPEC QL NAA+PROBE: NOT DETECTED
HPIV2 RNA SPEC QL NAA+PROBE: NOT DETECTED
HPIV3 RNA SPEC QL NAA+PROBE: NOT DETECTED
HPIV4 RNA SPEC QL NAA+PROBE: NOT DETECTED
LVEF ECHO: NORMAL
M PNEUMO DNA SPEC QL NAA+PROBE: NOT DETECTED
MCH RBC QN AUTO: 31.1 PG (ref 26.5–33)
MCHC RBC AUTO-ENTMCNC: 29.8 G/DL (ref 31.5–36.5)
MCV RBC AUTO: 105 FL (ref 78–100)
PLATELET # BLD AUTO: 210 10E3/UL (ref 150–450)
POTASSIUM SERPL-SCNC: 4.4 MMOL/L (ref 3.4–5.3)
RBC # BLD AUTO: 3.47 10E6/UL (ref 3.8–5.2)
RSV RNA SPEC QL NAA+PROBE: NOT DETECTED
RSV RNA SPEC QL NAA+PROBE: NOT DETECTED
RV+EV RNA SPEC QL NAA+PROBE: NOT DETECTED
SODIUM SERPL-SCNC: 140 MMOL/L (ref 135–145)
WBC # BLD AUTO: 8.2 10E3/UL (ref 4–11)

## 2024-01-28 PROCEDURE — 255N000002 HC RX 255 OP 636: Performed by: STUDENT IN AN ORGANIZED HEALTH CARE EDUCATION/TRAINING PROGRAM

## 2024-01-28 PROCEDURE — 999N000208 ECHOCARDIOGRAM COMPLETE

## 2024-01-28 PROCEDURE — 210N000002 HC R&B HEART CARE

## 2024-01-28 PROCEDURE — 87581 M.PNEUMON DNA AMP PROBE: CPT | Performed by: HOSPITALIST

## 2024-01-28 PROCEDURE — 250N000011 HC RX IP 250 OP 636: Performed by: STUDENT IN AN ORGANIZED HEALTH CARE EDUCATION/TRAINING PROGRAM

## 2024-01-28 PROCEDURE — 85027 COMPLETE CBC AUTOMATED: CPT | Performed by: STUDENT IN AN ORGANIZED HEALTH CARE EDUCATION/TRAINING PROGRAM

## 2024-01-28 PROCEDURE — 36415 COLL VENOUS BLD VENIPUNCTURE: CPT | Performed by: STUDENT IN AN ORGANIZED HEALTH CARE EDUCATION/TRAINING PROGRAM

## 2024-01-28 PROCEDURE — 250N000013 HC RX MED GY IP 250 OP 250 PS 637: Performed by: HOSPITALIST

## 2024-01-28 PROCEDURE — 99232 SBSQ HOSP IP/OBS MODERATE 35: CPT | Performed by: HOSPITALIST

## 2024-01-28 PROCEDURE — 250N000011 HC RX IP 250 OP 636: Performed by: HOSPITALIST

## 2024-01-28 PROCEDURE — 36415 COLL VENOUS BLD VENIPUNCTURE: CPT | Performed by: HOSPITALIST

## 2024-01-28 PROCEDURE — 93306 TTE W/DOPPLER COMPLETE: CPT | Mod: 26 | Performed by: INTERNAL MEDICINE

## 2024-01-28 PROCEDURE — 250N000013 HC RX MED GY IP 250 OP 250 PS 637: Performed by: STUDENT IN AN ORGANIZED HEALTH CARE EDUCATION/TRAINING PROGRAM

## 2024-01-28 PROCEDURE — 80048 BASIC METABOLIC PNL TOTAL CA: CPT | Performed by: STUDENT IN AN ORGANIZED HEALTH CARE EDUCATION/TRAINING PROGRAM

## 2024-01-28 RX ORDER — IBUPROFEN 400 MG/1
400 TABLET, FILM COATED ORAL EVERY 6 HOURS PRN
Status: DISCONTINUED | OUTPATIENT
Start: 2024-01-28 | End: 2024-01-28

## 2024-01-28 RX ORDER — ENOXAPARIN SODIUM 100 MG/ML
40 INJECTION SUBCUTANEOUS EVERY 24 HOURS
Status: DISCONTINUED | OUTPATIENT
Start: 2024-01-28 | End: 2024-01-28

## 2024-01-28 RX ORDER — ENOXAPARIN SODIUM 100 MG/ML
40 INJECTION SUBCUTANEOUS EVERY 12 HOURS
Status: DISCONTINUED | OUTPATIENT
Start: 2024-01-28 | End: 2024-02-06 | Stop reason: HOSPADM

## 2024-01-28 RX ORDER — IBUPROFEN 600 MG/1
600 TABLET, FILM COATED ORAL EVERY 8 HOURS PRN
Status: DISCONTINUED | OUTPATIENT
Start: 2024-01-28 | End: 2024-01-29

## 2024-01-28 RX ADMIN — ONDANSETRON 4 MG: 2 INJECTION INTRAMUSCULAR; INTRAVENOUS at 22:45

## 2024-01-28 RX ADMIN — ENOXAPARIN SODIUM 40 MG: 40 INJECTION SUBCUTANEOUS at 17:00

## 2024-01-28 RX ADMIN — HUMAN ALBUMIN MICROSPHERES AND PERFLUTREN 9 ML: 10; .22 INJECTION, SOLUTION INTRAVENOUS at 08:51

## 2024-01-28 RX ADMIN — MICONAZOLE NITRATE: 2 POWDER TOPICAL at 09:56

## 2024-01-28 RX ADMIN — IBUPROFEN 600 MG: 600 TABLET ORAL at 11:55

## 2024-01-28 RX ADMIN — LEVOTHYROXINE SODIUM 125 MCG: 125 TABLET ORAL at 08:15

## 2024-01-28 RX ADMIN — ACETAMINOPHEN 650 MG: 325 TABLET, FILM COATED ORAL at 08:15

## 2024-01-28 RX ADMIN — MICONAZOLE NITRATE: 2 POWDER TOPICAL at 21:06

## 2024-01-28 RX ADMIN — IBUPROFEN 600 MG: 600 TABLET ORAL at 20:21

## 2024-01-28 ASSESSMENT — ACTIVITIES OF DAILY LIVING (ADL)
ADLS_ACUITY_SCORE: 33
ADLS_ACUITY_SCORE: 33
ADLS_ACUITY_SCORE: 29
ADLS_ACUITY_SCORE: 29
ADLS_ACUITY_SCORE: 33
ADLS_ACUITY_SCORE: 29
ADLS_ACUITY_SCORE: 33
ADLS_ACUITY_SCORE: 33

## 2024-01-28 NOTE — PROVIDER NOTIFICATION
MD Notification    Notified Person: MD    Notified Person Name: Dr. Palm    Notification Date/Time: 01/27 @ 1952    Notification Interaction: AMCOM    Purpose of Notification: FYI: Trops trending down 95--170--146    Orders Received:    Comments:

## 2024-01-28 NOTE — PROGRESS NOTES
[facility]    Medicine Progress Note - Hospitalist Service    Date of Admission:  1/27/2024    Assessment & Plan     Yodit Harvey is a 43 year old female with past medical history significant for hypothyroidism and morbid obesity admitted on 1/27/2024 with dizziness and falls, found to be in SVT. She is also found to have mild hypoxia and compensated hypercapnia.     SVT   Dizziness, falls likely 2/2 above   Pt presented to the ED after two falls. The first fall she reports was a trip and fall, however when getting up she started feeling dizzy so sat down. When she got up she again started to feel dizzy and was assisted to the ground by her . She thinks though that she might have passed out briefly during the second fall. When EMS arrived her HR was 177.   - EKG in the ED showed SVT. She was given adenosine x2 and she eventually returned to a normal sinus rhythm.   -Patient evaluated by the cardiology team and initial differentials included heart failure with preserved ejection fraction versus high-output heart failure and they were not concerned about ischemia and they recommended to treat her underlying thyroid issues  -Patient was being monitored in the hospital and troponins peaked at 170 and they have trended down   -Echo on admission showed EF of 55 to 60%, LV diastolic function is indeterminate and mild RV volume overload  -Heart rate seems to be stable for now    Troponin elevation   -On admission presented without any chest pain and troponins peaked at around 170s and trended down   Continue to trend troponin   - TTE and Cardiology consult as noted above      Hypoxemia   Hypercapnia- suspect chronic, likely OHS and JL   History of recent COVID-19 infection in December 2023  -Pt continue to endorse some shortness of breath following conversion to NSR. O2 sats in the ED down to mid 80s at times. VBG showed elevated pCO2 to 62 but with a normal pH, so this is presumed  chronic.   -CXR was unremarkable but limited due to body habitus.   - CTPE negative for pulmonary embolism. Mild dependent probable atelectasis vs less likely infiltrate.   - Suspect the mild hypoxia and hypercapnia is chronic and related to obesity, likely undiagnosed sleep apnea and obesity hypoventilation syndrome  -On exam I did not appreciate any wheeze but she has swelling of the legs and on my examination patient did not needed any oxygen but at times when she sleeps she has been needing oxygen and there is no history of any prior sleep study in the past  -Patient during the course of the day has been needing intermittent oxygen and we will order overnight oximetry and see how she does  -Will also get VBG  -Discussed with the cardiology team and they do not recommend any indication of diuresis at this point in time and that given that she had elevated renal function yesterday and is taking ibuprofen I discussed with  that today is not optimal date to give her diuresis as it may worsen the renal function and he understands and agrees  - Continuous oximetry, supplemental O2 as needed   - Incentive spirometry   -Will check VBG again today  -Will also order respiratory panel  - Recommend outpatient sleep study      Likely acute kidney injury-resolved  -Creatinine on admission is 1.47. No prior creatinine on file.   -Renal function has improved to 0.98 today  -Will monitor daily     Uncontrolled hypothyroidism   Pt with known hx of hypothyroidism. PTA is on levothyroxine 250mcg daily, but has not been taking for years and prior to that was only intermittently taking  - TSH is 144 and T4 is 0.18   -will change to levothyroxine at 250 mcg  which was her prior dose  -`Will recommend to follow-up with endocrinology     Mild chronic macrocytic anemia   -Hemoglobin on admission was 10.6 and in EMR last recorded value is in 2016 when it was 11.6  -B12 and iron studies are adequate  -Will check for folate levels  tomorrow morning and this can be due to underlying hypothyroidism       Hyperglycemia  -  HbA1c during hospitalization is 5.7    Headache  -As needed Tylenol and as needed ibuprofen is available for the patient     Morbid obesity   Body mass index is 82.18 kg/m .  Pt reports she has gained over 200lbs since the start of COVID.   - Discussed briefly recommendations for weight loss   - Treating hypothyroidism should help   - Would also recommend referral to endocrinology or comprehensive weight management clinic.              Diet: Combination Diet Regular Diet Adult    DVT Prophylaxis: Pneumatic Compression Devices, will start subcu Lovenox  Gonzales Catheter: Not present  Lines: None     Cardiac Monitoring: ACTIVE order. Indication: Tachyarrhythmias, acute (48 hours)  Code Status: Full Code      Clinically Significant Risk Factors                                    Disposition Plan      Expected Discharge Date: 01/29/2024      Destination: home              Alma Christopher MD  Hospitalist Service  [facility]  Securely message with [employer] (more info)  Text page via [facility] Paging/Directory   ______________________________________________________________________    Interval History     Saw the patient this morning and she denied any shortness of breath, nausea, vomiting or chest discomfort.  The  was worried about the BNP had echo at that time was pending.  Patient's oxygenation when I saw her was stable on room air but getting a good waveform on her is a challenge.  She was complaining of headache and that Tylenol did not help and has taken ibuprofen in the past.    I again saw the patient later in the day and the  and her mother were present and discussed the results of the echo.  The  was wondering about edema and is aware that it is most likely due to hypothyroidism.  I did discuss with him that we will consult PT and Occupational Therapy tomorrow and overnight oximetry will  be ordered.  If she continues to have significant hypoxia at rest then she will benefit From Pulmonary Team and He Is on Board with the Plan of Care.    Physical Exam   Vital Signs: Temp: 98.4  F (36.9  C) Temp src: Oral BP: 106/55 Pulse: 79   Resp: 20 SpO2: 98 % O2 Device: Oxymask Oxygen Delivery: 2 LPM  Weight: 493 lbs 13.29 oz        General: Patient appears comfortable and in no acute distress.  HEENT: Head is atraumatic, normocephalic.    Neck: Neck is supple and No Lymphadenopathy   Respiratory: Lungs are clear to auscultation bilaterally with no wheeze or crackles   Cardiovascular: Regular rate , S1 and S2 normal with no murmer or rubs or gallops and has bilateral swelling  Abdomen:   soft , non tender , non distended and bowel sound present   Skin: No skin rashes or lesions to inspection or palpation.  Neurologic: Higher functions are within normal limits. No obvious defects in speech, language and memory. No facial droop  Musculoskeletal: Normal Range of motion over upper and lower extremities bilaterally   Psychiatric: cooperative      Medical Decision Making           Time time spent in care of patient is 45 minutes and I reviewed her labs including CBC and basic metabolic panel and discussed  plan of care in detail with the patient and nursing staff.  I again saw the patient this evening and also discussed with her , mother and the cardiology team.    Data     I have personally reviewed the following data over the past 24 hrs:    8.2  \   10.8 (L)   / 210     140 100 14.1 /  134 (H)   4.4 32 (H) 0.98 (H) \     Trop: 146 (HH) BNP: 1,199 (H)     TSH: N/A T4: N/A A1C: 5.7 (H)     Ferritin:  152 % Retic:  N/A LDH:  N/A       Imaging results reviewed over the past 24 hrs:   Recent Results (from the past 24 hour(s))   CT Chest Pulmonary Embolism w Contrast    Narrative    EXAM: CT CHEST PULMONARY EMBOLISM W CONTRAST  LOCATION: [facility]  DATE: 1/27/2024    INDICATION:  Hypoxia.  COMPARISON: None.  TECHNIQUE: CT chest pulmonary angiogram during arterial phase injection of IV contrast. Multiplanar reformats and MIP reconstructions were performed. Dose reduction techniques were used.   CONTRAST: 83mL Isovue 370    FINDINGS:  ANGIOGRAM CHEST: Pulmonary arteries are normal caliber and negative for pulmonary emboli. Thoracic aorta is negative for dissection. No CT evidence of right heart strain.    LUNGS AND PLEURA: Mild dependent probable atelectasis versus less likely infiltrate. No effusions.    MEDIASTINUM/AXILLAE: No adenopathy. No aneurysm.    CORONARY ARTERY CALCIFICATION: None.    UPPER ABDOMEN: No acute findings.    MUSCULOSKELETAL: No frankly destructive bony lesions.      Impression    IMPRESSION:  1.  No pulmonary embolism demonstrated.  2.  Mild dependent probable atelectasis versus less likely infiltrate.

## 2024-01-28 NOTE — PLAN OF CARE
"Shift Note [id number]:     Patient is alert and oriented x4.   Mobility: Ax2 GB, Walker  Vitals: VSS-soft b/p's at times. Continues on 2L NC  Tele: SR  Aggression Stop Light: green    Shift Summary: Patient admitted to floor from ED. Per pt report fallen 2x today. Redness noted in patient's folds. Blisters noted under right breast and back of LLE. WOC consulted. Attempted to use PCDs but patient stated \"it hurts when it squeezes\"-PCDs removed at this time.     Discharge Plan: pending test results.     See Flow sheets for assessment  "

## 2024-01-28 NOTE — PLAN OF CARE
Goal Outcome Evaluation:    Orientations: Aox4  Vitals/Pain: VSS, RA when awake, up to 5L oxymask when asleep, PRN tylenol and ibuprofen given x1  Tele: SR  Lines/Drains: PIV SL  Skin/Wounds: Blistering under right breast and back of left leg, rash in folds of buttocks   GI/: UOA, -BM  Labs: Abnormal/Trends, Electrolyte Replacement- VBG needs to be collected, repiratory panel in process, BMP and CBC in AM  Ambulation/Assist: A2 GBW  Plan: Overnight oximetry study tonight, WOC consulted, PT and lymphedema therapy ordered, continue plan of care

## 2024-01-28 NOTE — PLAN OF CARE
Primary Diagnosis: SVT (supraventricular tachycardia)  Hypoxia  Hypothyroidism, unspecified type  Inpatient Status: Cardiac   Needed? No  Procedures This Admit: Echo pending   Drips: nONE  Drains & Devices:  PIV SL   Rhythm:  SR  Activity Level: Ax2/GB/W vs Lift  Oxygen needs: 2L Oxymask, desats @ HS, possible JL  Important Labs Since Admit: Elevated Trops but trending down  Significant Echo results: n/a  Anticipated D/C Date: TBD  GI/: Continent. Tolerating regular diet  Other Concerns:Redness noted in patient's folds. Blisters noted under right breast and back of LLE. WOC consulted     Nurse: Donavan RN

## 2024-01-29 ENCOUNTER — APPOINTMENT (OUTPATIENT)
Dept: PHYSICAL THERAPY | Facility: CLINIC | Age: 44
End: 2024-01-29
Attending: HOSPITALIST
Payer: COMMERCIAL

## 2024-01-29 LAB
ANION GAP SERPL CALCULATED.3IONS-SCNC: 7 MMOL/L (ref 7–15)
BASE EXCESS BLDV CALC-SCNC: -4.1 MMOL/L (ref -3–3)
BASE EXCESS BLDV CALC-SCNC: 3.6 MMOL/L (ref -3–3)
BASE EXCESS BLDV CALC-SCNC: 3.6 MMOL/L (ref -3–3)
BASE EXCESS BLDV CALC-SCNC: 5.4 MMOL/L (ref -3–3)
BUN SERPL-MCNC: 16.3 MG/DL (ref 6–20)
CALCIUM SERPL-MCNC: 9.2 MG/DL (ref 8.6–10)
CHLORIDE SERPL-SCNC: 97 MMOL/L (ref 98–107)
CREAT SERPL-MCNC: 0.91 MG/DL (ref 0.51–0.95)
DEPRECATED HCO3 PLAS-SCNC: 33 MMOL/L (ref 22–29)
EGFRCR SERPLBLD CKD-EPI 2021: 79 ML/MIN/1.73M2
ERYTHROCYTE [DISTWIDTH] IN BLOOD BY AUTOMATED COUNT: 15.9 % (ref 10–15)
GLUCOSE BLDC GLUCOMTR-MCNC: 85 MG/DL (ref 70–99)
GLUCOSE BLDC GLUCOMTR-MCNC: 92 MG/DL (ref 70–99)
GLUCOSE SERPL-MCNC: 129 MG/DL (ref 70–99)
HCO3 BLDV-SCNC: 24 MMOL/L (ref 21–28)
HCO3 BLDV-SCNC: 34 MMOL/L (ref 21–28)
HCO3 BLDV-SCNC: 35 MMOL/L (ref 21–28)
HCO3 BLDV-SCNC: 36 MMOL/L (ref 21–28)
HCT VFR BLD AUTO: 33.8 % (ref 35–47)
HGB BLD-MCNC: 10.2 G/DL (ref 11.7–15.7)
MCH RBC QN AUTO: 31.8 PG (ref 26.5–33)
MCHC RBC AUTO-ENTMCNC: 30.2 G/DL (ref 31.5–36.5)
MCV RBC AUTO: 105 FL (ref 78–100)
NT-PROBNP SERPL-MCNC: 1271 PG/ML (ref 0–450)
O2/TOTAL GAS SETTING VFR VENT: 4 %
O2/TOTAL GAS SETTING VFR VENT: 4 %
O2/TOTAL GAS SETTING VFR VENT: 50 %
O2/TOTAL GAS SETTING VFR VENT: 75 %
OXYHGB MFR BLDV: 30 % (ref 70–75)
OXYHGB MFR BLDV: 58 % (ref 70–75)
OXYHGB MFR BLDV: 62 % (ref 70–75)
OXYHGB MFR BLDV: 73 % (ref 70–75)
PCO2 BLDV: 60 MM HG (ref 40–50)
PCO2 BLDV: 86 MM HG (ref 40–50)
PCO2 BLDV: 94 MM HG (ref 40–50)
PCO2 BLDV: 94 MM HG (ref 40–50)
PH BLDV: 7.17 [PH] (ref 7.32–7.43)
PH BLDV: 7.19 [PH] (ref 7.32–7.43)
PH BLDV: 7.2 [PH] (ref 7.32–7.43)
PH BLDV: 7.21 [PH] (ref 7.32–7.43)
PLATELET # BLD AUTO: 171 10E3/UL (ref 150–450)
PO2 BLDV: 23 MM HG (ref 25–47)
PO2 BLDV: 34 MM HG (ref 25–47)
PO2 BLDV: 37 MM HG (ref 25–47)
PO2 BLDV: 42 MM HG (ref 25–47)
POTASSIUM SERPL-SCNC: 4.5 MMOL/L (ref 3.4–5.3)
RBC # BLD AUTO: 3.21 10E6/UL (ref 3.8–5.2)
SAO2 % BLDV: 30 % (ref 70–75)
SAO2 % BLDV: 59.3 % (ref 70–75)
SAO2 % BLDV: 63.7 % (ref 70–75)
SAO2 % BLDV: 74.3 % (ref 70–75)
SODIUM SERPL-SCNC: 137 MMOL/L (ref 135–145)
WBC # BLD AUTO: 8.1 10E3/UL (ref 4–11)

## 2024-01-29 PROCEDURE — 250N000011 HC RX IP 250 OP 636: Performed by: STUDENT IN AN ORGANIZED HEALTH CARE EDUCATION/TRAINING PROGRAM

## 2024-01-29 PROCEDURE — 250N000011 HC RX IP 250 OP 636: Performed by: INTERNAL MEDICINE

## 2024-01-29 PROCEDURE — 97530 THERAPEUTIC ACTIVITIES: CPT | Mod: GP

## 2024-01-29 PROCEDURE — 200N000001 HC R&B ICU

## 2024-01-29 PROCEDURE — 258N000003 HC RX IP 258 OP 636: Performed by: PEDIATRICS

## 2024-01-29 PROCEDURE — 5A09557 ASSISTANCE WITH RESPIRATORY VENTILATION, GREATER THAN 96 CONSECUTIVE HOURS, CONTINUOUS POSITIVE AIRWAY PRESSURE: ICD-10-PCS | Performed by: INTERNAL MEDICINE

## 2024-01-29 PROCEDURE — 82805 BLOOD GASES W/O2 SATURATION: CPT | Performed by: INTERNAL MEDICINE

## 2024-01-29 PROCEDURE — 85027 COMPLETE CBC AUTOMATED: CPT | Performed by: HOSPITALIST

## 2024-01-29 PROCEDURE — 97161 PT EVAL LOW COMPLEX 20 MIN: CPT | Mod: GP

## 2024-01-29 PROCEDURE — 97140 MANUAL THERAPY 1/> REGIONS: CPT | Mod: GP

## 2024-01-29 PROCEDURE — 94660 CPAP INITIATION&MGMT: CPT

## 2024-01-29 PROCEDURE — 99291 CRITICAL CARE FIRST HOUR: CPT | Performed by: INTERNAL MEDICINE

## 2024-01-29 PROCEDURE — 94762 N-INVAS EAR/PLS OXIMTRY CONT: CPT

## 2024-01-29 PROCEDURE — 36569 INSJ PICC 5 YR+ W/O IMAGING: CPT

## 2024-01-29 PROCEDURE — 250N000011 HC RX IP 250 OP 636: Performed by: HOSPITALIST

## 2024-01-29 PROCEDURE — 36569 INSJ PICC 5 YR+ W/O IMAGING: CPT | Mod: 52

## 2024-01-29 PROCEDURE — 999N000157 HC STATISTIC RCP TIME EA 10 MIN

## 2024-01-29 PROCEDURE — G0463 HOSPITAL OUTPT CLINIC VISIT: HCPCS

## 2024-01-29 PROCEDURE — 80048 BASIC METABOLIC PNL TOTAL CA: CPT | Performed by: HOSPITALIST

## 2024-01-29 PROCEDURE — 82805 BLOOD GASES W/O2 SATURATION: CPT | Performed by: HOSPITALIST

## 2024-01-29 PROCEDURE — 250N000009 HC RX 250: Performed by: INTERNAL MEDICINE

## 2024-01-29 PROCEDURE — 36415 COLL VENOUS BLD VENIPUNCTURE: CPT | Performed by: HOSPITALIST

## 2024-01-29 PROCEDURE — 99291 CRITICAL CARE FIRST HOUR: CPT | Performed by: HOSPITALIST

## 2024-01-29 PROCEDURE — 272N000452 HC KIT SHRLOCK 5FR POWER PICC TRIPLE LUMEN

## 2024-01-29 PROCEDURE — 272N000433 HC KIT CATH IV 18 OR 20G CM, POWERGLIDE W MAX BARRIER

## 2024-01-29 PROCEDURE — 83880 ASSAY OF NATRIURETIC PEPTIDE: CPT | Performed by: HOSPITALIST

## 2024-01-29 RX ORDER — KETOROLAC TROMETHAMINE 15 MG/ML
15 INJECTION, SOLUTION INTRAMUSCULAR; INTRAVENOUS EVERY 6 HOURS PRN
Status: DISCONTINUED | OUTPATIENT
Start: 2024-01-29 | End: 2024-02-02

## 2024-01-29 RX ORDER — NICOTINE POLACRILEX 4 MG
15-30 LOZENGE BUCCAL
Status: DISCONTINUED | OUTPATIENT
Start: 2024-01-29 | End: 2024-02-06 | Stop reason: HOSPADM

## 2024-01-29 RX ORDER — ONDANSETRON 2 MG/ML
4 INJECTION INTRAMUSCULAR; INTRAVENOUS EVERY 6 HOURS PRN
Status: DISCONTINUED | OUTPATIENT
Start: 2024-01-29 | End: 2024-01-29

## 2024-01-29 RX ORDER — KETOROLAC TROMETHAMINE 30 MG/ML
15 INJECTION, SOLUTION INTRAMUSCULAR; INTRAVENOUS EVERY 6 HOURS PRN
Status: DISCONTINUED | OUTPATIENT
Start: 2024-01-29 | End: 2024-01-29

## 2024-01-29 RX ORDER — HEPARIN SODIUM 10000 [USP'U]/ML
7500 INJECTION, SOLUTION INTRAVENOUS; SUBCUTANEOUS EVERY 8 HOURS
Status: DISCONTINUED | OUTPATIENT
Start: 2024-01-29 | End: 2024-01-29 | Stop reason: ALTCHOICE

## 2024-01-29 RX ORDER — LIDOCAINE 40 MG/G
CREAM TOPICAL
Status: DISCONTINUED | OUTPATIENT
Start: 2024-01-29 | End: 2024-01-30

## 2024-01-29 RX ORDER — KETOROLAC TROMETHAMINE 30 MG/ML
15 INJECTION, SOLUTION INTRAMUSCULAR; INTRAVENOUS ONCE
Status: COMPLETED | OUTPATIENT
Start: 2024-01-29 | End: 2024-01-29

## 2024-01-29 RX ORDER — NITROGLYCERIN 0.4 MG/1
0.4 TABLET SUBLINGUAL EVERY 5 MIN PRN
Status: DISCONTINUED | OUTPATIENT
Start: 2024-01-29 | End: 2024-01-31

## 2024-01-29 RX ORDER — DEXTROSE MONOHYDRATE 25 G/50ML
25-50 INJECTION, SOLUTION INTRAVENOUS
Status: DISCONTINUED | OUTPATIENT
Start: 2024-01-29 | End: 2024-02-06 | Stop reason: HOSPADM

## 2024-01-29 RX ORDER — SODIUM CHLORIDE, SODIUM LACTATE, POTASSIUM CHLORIDE, CALCIUM CHLORIDE 600; 310; 30; 20 MG/100ML; MG/100ML; MG/100ML; MG/100ML
INJECTION, SOLUTION INTRAVENOUS CONTINUOUS
Status: DISCONTINUED | OUTPATIENT
Start: 2024-01-29 | End: 2024-01-30

## 2024-01-29 RX ORDER — ONDANSETRON 4 MG/1
4 TABLET, ORALLY DISINTEGRATING ORAL EVERY 6 HOURS PRN
Status: DISCONTINUED | OUTPATIENT
Start: 2024-01-29 | End: 2024-01-29

## 2024-01-29 RX ORDER — LIDOCAINE 40 MG/G
CREAM TOPICAL
Status: DISCONTINUED | OUTPATIENT
Start: 2024-01-29 | End: 2024-02-06 | Stop reason: HOSPADM

## 2024-01-29 RX ORDER — KETOROLAC TROMETHAMINE 15 MG/ML
15 INJECTION, SOLUTION INTRAMUSCULAR; INTRAVENOUS EVERY 6 HOURS PRN
Status: DISCONTINUED | OUTPATIENT
Start: 2024-01-29 | End: 2024-01-29

## 2024-01-29 RX ORDER — CARBOXYMETHYLCELLULOSE SODIUM 5 MG/ML
1 SOLUTION/ DROPS OPHTHALMIC
Status: DISCONTINUED | OUTPATIENT
Start: 2024-01-29 | End: 2024-02-06 | Stop reason: HOSPADM

## 2024-01-29 RX ADMIN — KETOROLAC TROMETHAMINE 15 MG: 30 INJECTION, SOLUTION INTRAMUSCULAR; INTRAVENOUS at 15:31

## 2024-01-29 RX ADMIN — KETOROLAC TROMETHAMINE 15 MG: 30 INJECTION, SOLUTION INTRAMUSCULAR; INTRAVENOUS at 09:14

## 2024-01-29 RX ADMIN — PROCHLORPERAZINE EDISYLATE 5 MG: 5 INJECTION INTRAMUSCULAR; INTRAVENOUS at 08:11

## 2024-01-29 RX ADMIN — ONDANSETRON 4 MG: 2 INJECTION INTRAMUSCULAR; INTRAVENOUS at 05:38

## 2024-01-29 RX ADMIN — ENOXAPARIN SODIUM 40 MG: 40 INJECTION SUBCUTANEOUS at 04:16

## 2024-01-29 RX ADMIN — MICONAZOLE NITRATE: 2 POWDER TOPICAL at 20:14

## 2024-01-29 RX ADMIN — KETOROLAC TROMETHAMINE 15 MG: 15 INJECTION, SOLUTION INTRAMUSCULAR; INTRAVENOUS at 19:57

## 2024-01-29 RX ADMIN — SODIUM CHLORIDE, POTASSIUM CHLORIDE, SODIUM LACTATE AND CALCIUM CHLORIDE: 600; 310; 30; 20 INJECTION, SOLUTION INTRAVENOUS at 22:26

## 2024-01-29 RX ADMIN — ENOXAPARIN SODIUM 40 MG: 40 INJECTION SUBCUTANEOUS at 18:31

## 2024-01-29 RX ADMIN — LIDOCAINE HYDROCHLORIDE 1 ML: 10 INJECTION, SOLUTION EPIDURAL; INFILTRATION; INTRACAUDAL; PERINEURAL at 19:05

## 2024-01-29 ASSESSMENT — ACTIVITIES OF DAILY LIVING (ADL)
ADLS_ACUITY_SCORE: 29
ADLS_ACUITY_SCORE: 34
ADLS_ACUITY_SCORE: 29

## 2024-01-29 NOTE — PROGRESS NOTES
01/29/24 1500   Appointment Info   Signing Clinician's Name / Credentials (PT) Kourtney Ragsdale, ADEN   Living Environment   People in Home significant other;child(pam), dependent   Current Living Arrangements house   Self-Care   Usual Activity Tolerance moderate   Current Activity Tolerance poor   Regular Exercise No   Equipment Currently Used at Home cane, straight;wheelchair, manual   Fall history within last six months yes   Number of times patient has fallen within last six months 2   General Information   Onset of Illness/Injury or Date of Surgery 01/27/24   Referring Physician Dr. Veram   Patient/Family Therapy Goals Statement (PT) To go home   Pertinent History of Current Problem (include personal factors and/or comorbidities that impact the POC) Pt is a 43 year old female admitted with SVT, hypoxia   Existing Precautions/Restrictions fall   Cognition   Affect/Mental Status (Cognition) WFL   Orientation Status (Cognition) oriented x 3   Follows Commands (Cognition) WFL   Pain Assessment   Patient Currently in Pain No   Range of Motion (ROM)   Range of Motion ROM is WFL   Strength (Manual Muscle Testing)   Strength (Manual Muscle Testing) Deficits observed during functional mobility   Bed Mobility   Comment, (Bed Mobility) Min A   Transfers   Comment, (Transfers) Min A   Gait/Stairs (Locomotion)   Comment, (Gait/Stairs) NT   Balance   Balance Comments Good in sitting, fair in standing   Clinical Impression   Criteria for Skilled Therapeutic Intervention Yes, treatment indicated   PT Diagnosis (PT) Impaired ambulation   Influenced by the following impairments Decreased strength, decreased endurance, decreased balance   Functional limitations due to impairments Difficulty with bed mobility, transfers, ambulation, stair management   Clinical Presentation (PT Evaluation Complexity) evolving   Clinical Presentation Rationale Clinical judgment   Clinical Decision Making (Complexity) moderate complexity   Planned  Therapy Interventions (PT) balance training;bed mobility training;gait training;patient/family education;stair training;strengthening;transfer training   Risk & Benefits of therapy have been explained evaluation/treatment results reviewed;risks/benefits reviewed;care plan/treatment goals reviewed;current/potential barriers reviewed;participants voiced agreement with care plan;participants included;patient   PT Total Evaluation Time   PT Eval, Low Complexity Minutes (61260) 10   PT Discharge Planning   PT Plan Ambulation with w/c follow, lymphedema wraps when able   PT Discharge Recommendation (DC Rec) home with assist   PT Rationale for DC Rec Anticipate with further medical management and therapy, pt will progress to SBA and be safe to discharge home with family   PT Brief overview of current status Assist of 1, decreased activity tolerance

## 2024-01-29 NOTE — PROGRESS NOTES
Called respiratory care 2x to request setting up sleep study. Still awaiting RT to come to bedside. On 5L Oxymask.     RT came to set up sleep oxygen study at 1045.

## 2024-01-29 NOTE — PLAN OF CARE
Goal Outcome Evaluation:      Plan of Care Reviewed With: patient    Overall Patient Progress: improvingOverall Patient Progress: improving    Outcome Evaluation: Pt neuros intact. Underwent overnight oxygen study. Pt observed to be apneic frequently requiring increasing levels of O2. Currelty on 8L via oxymask. Sats dropped as low as 20%. RT notified. NSR, BP WDL. Afebrile.    0615: Pt awoke with c/o nausea and headache. Zofran given with no effect. Pt repositioned higher in bed and sitting more upright. Switched over to NC 3L, sating mid 90s. VSS WDL. Unable to tolerate Ibu or Tylenol at this time. Ice chips given with some relief. MD updated. VBG to be ordered. No further interventions at this time.

## 2024-01-29 NOTE — PROGRESS NOTES
Order received to transfer to ICU. Report called to ICU RN. Family and patient aware of transfer, in agreement. All belongings packed and sent.

## 2024-01-29 NOTE — PROGRESS NOTES
Critical vbg results of pc02 86 and pH 7.20 received from lab. This is a slight improvement from prior, and RT is in room presently attempting to place BIPAP. MD made aware of results in person. Pt education on the importance of allowing the BIPAP to assist her breathing. RN received a message that  wanted to be called by RN. Pt gives verbal consent for RN to update . RN called  but there was no answer after 5 rings.

## 2024-01-29 NOTE — PROVIDER NOTIFICATION
Message sent to Dr Christopher: Critical VBG results of pH 7.17, pCO2 94. Please advise. She is alert but feeling unwell with headache and nausea.

## 2024-01-29 NOTE — CONSULTS
[facility] Nurse Inpatient Assessment     Consulted for: Left Leg wound    Summary: Pt has very deep skin folds with chronic moisture trapping. Overall skin does look pretty good, however could use some routine skin care. Will use Interdry AG to wick moisture, no powder when using Interdry AG    Patient History (according to provider note(s):      Yodit Harvey is a 43 year old female with past medical history significant for hypothyroidism and morbid obesity admitted on 1/27/2024 with dizziness and falls, found to be in SVT. She is also found to have mild hypoxia and compensated hypercapnia.       Assessment:      Areas visualized during today's visit: Skin folds     Skin Injury Location: Skin folds    Last photo: none taken  Skin injury due to: Intertrigo  Skin history and plan of care:   Pt has very deep skin folds with mild irritation and noted moisture trapping. No significant rash, erythema and no open areas observed.   Affected area:      Skin assessment: Faint, mild, superficial denudement/irritation to skin creases behind knees, pannus and breast creases.      Measurements (length x width x depth, in cm) No measurable open areas      Color: normal and consistent with surrounding tissue and pink     Temperature  moist     Drainage: none .      Color: none      Odor: none  Pain: denies , none  Pain interventions prior to dressing change: N/A  Treatment goal: Decrease moisture and Protection  STATUS: initial assessment  Supplies ordered: ordered Interdry AG and supplies stored on unit       Treatment Plan:     Skin Folds wound(s): BID  Daily:  Interdry(order#[id number]):   1.  Cleanse skin folds with christa cleanse and protect and dry white wash cloth.  2.  With clean scissors, cut enough fabric to cover the affected area, allowing for a minimum of 2 inches to extend beyond the skin fold for moisture evaporation.  3.  Lay a single layer of fabric in the skin fold, placing one edge  into the base of the fold. Gently smooth the rest of the fabric over the skin, keeping it flat.  4.  Leave at least 2 inches of fabric exposed outside the skin fold.  5.  Secure the fabric in one of several ways: with the skin fold, with a small amount of skin-friendly tape, or tucked under clothing.  6.  Remove the fabric before bathing and reuse it when finished. When removing, gently separate the skin fold and lift away.  Helpful Hints  1. InterDry  can be written on with a ballpoint pen. It may be helpful to label each piece of InterDry  with the date you started using it.  2.  Each piece of InterDry  may be used up to 5 days, depending on fabric soiling, odor, amount of moisture and general skin condition. Replace InterDry  if it becomes soiled with blood, urine or stool.  3.  Do not use creams, ointments, or powders with InterDry  as it may interfere with product efficacy.       Orders: Written    RECOMMEND PRIMARY TEAM ORDER: None, at this time  Education provided: importance of repositioning, plan of care, and Moisture management  Discussed plan of care with: Patient and Nurse  WOC nurse follow-up plan: signing off  Notify WOC if wound(s) deteriorate.  Nursing to notify the Provider(s) and re-consult the WOC Nurse if new skin concern.    DATA:     Current support surface: Standard  Standard gel/foam mattress (IsoFlex, Atmos air, etc)-nursing has bariatric bed and waiting on sling for transfer  Containment of urine/stool: Incontinence Protocol and Incontinent pad in bed  BMI: Body mass index is 84.77 kg/m .   Active diet order: Orders Placed This Encounter      NPO for Medical/Clinical Reasons Except for: No Exceptions     Output: No intake/output data recorded.     Labs:   Recent Labs   Lab 01/29/24  0653 01/28/24  0542 01/27/24  1516 01/27/24  0450   ALBUMIN  --   --   --  4.2   HGB 10.2*   < >  --  10.6*   WBC 8.1   < >  --  11.0   A1C  --   --  5.7*  --     < > = values in this interval not displayed.      Pressure injury risk assessment:   Sensory Perception: 3-->slightly limited  Moisture: 4-->rarely moist  Activity: 2-->chairfast  Mobility: 2-->very limited  Nutrition: 2-->probably inadequate  Friction and Shear: 2-->potential problem  Pal Score: 15    Alfonso Fried RN CWOCN  -Securely message with [employer] (more info) - can reach individually by name or search 'WOC Nurse' (Vini) to reach all current WOCs on duty.  WOC Office Phone: [phone]

## 2024-01-29 NOTE — PROGRESS NOTES
[facility]    Medicine Progress Note - Hospitalist Service    Date of Admission:  1/27/2024    Assessment & Plan     Yodit Harvey is a 43 year old female with past medical history significant for hypothyroidism and morbid obesity admitted on 1/27/2024 with dizziness and falls, found to be in SVT. She is also found to have mild hypoxia and compensated hypercapnia.           Respiratory failure with hypoxemia and hypercapnia likely due to OHS and JL  Respiratory acidosis  Hypercapnia- suspect chronic, likely OHS and JL   History of recent COVID-19 infection in December 2023  -On admission patient endorsed some shortness of breath following conversion to NSR. O2 sats in the ED down to mid 80s at times. VBG showed elevated pCO2 to 62 but with a normal pH, so this is presumed chronic.   -CXR was unremarkable but limited due to body habitus.   - CTPE negative for pulmonary embolism. Mild dependent probable atelectasis vs less likely infiltrate.   -It was thought that her mild hypoxia and hypercapnia is chronic likely due to obesity hypoventilation syndrome, likely due to undiagnosed sleep apnea  -Respiratory panel was checked on admission and was negative  --Echo on admission showed EF of 55 to 60%, LV diastolic function is indeterminate and mild RV volume overload  -She did had mildly elevated proBNP on admission and I did order repeat as per request of her  and it is near same as before and cardiology did not think that patient needed any diuresis  -On exam she does seem to have bilateral lymphedema and lymphedema therapy has been ordered  -I had ordered overnight oximetry study and reviewed the report  -I had ordered VBG yesterday evening but it was never done and the hospital medicine team was never notified and VBG this morning showed pH of 7.17 with a pCO2 of 94  -This morning patient was very nauseous and she has been following commands and I had ordered BiPAP and respiratory  was not comfortable on starting BiPAP given this for aspiration.  I ordered a stat ABG but was not able to be drawn.  I did discuss with patient and  about risk and benefits and they were okay with the BiPAP trial.  Patient has been alert oriented x 3 all the time and has been following commands.  Before starting BiPAP I repeated blood gas which showed that venous pH improved to 7.20 with pCO2 of 86   -patient was started on BiPAP and was continued and had half an hour break in the afternoon  -And pulmonary team was also consulted today  -Repeat VBG done at 230 this afternoon does not show improvement in her VBG and shows pH of 7.19 with pCO2 of 94.  -Pulmonary team recommends to put arterial line and they want to see what her successive ABGs are on while she is on BiPAP and if there is no improvement in her CO2 then patient will need to be intubated and this was discussed with the patient,  and mother and initially she was reluctant but then patient understand the risk and benefits and is on board with intubation if needed.  -She will be moved to ICU and I have also consulted intensivist and they will follow-up on ongoing blood gases and decide on need for intubation  --Will need definitive sleep study at discharge  -Discussed with Dr Song Intensivist and for the rest for the day they will manage and follow blood gases and appreciate input     SVT   Dizziness, falls likely 2/2 above   Pt presented to the ED after two falls. The first fall she reports was a trip and fall, however when getting up she started feeling dizzy so sat down. When she got up she again started to feel dizzy and was assisted to the ground by her . She thinks though that she might have passed out briefly during the second fall. When EMS arrived her HR was 177.   - EKG in the ED showed SVT. She was given adenosine x2 and she eventually returned to a normal sinus rhythm.   -Patient evaluated by the cardiology team and  initial differentials included heart failure with preserved ejection fraction versus high-output heart failure and they were not concerned about ischemia and they recommended to treat her underlying thyroid issues  -Patient was being monitored in the hospital and troponins peaked at 170 and they have trended down   -Echo on admission showed EF of 55 to 60%, LV diastolic function is indeterminate and mild RV volume overload  -Heart rate seems to be stable for now    Troponin elevation likely due to demand  -On admission presented without any chest pain and troponins peaked at around 170s and trended down   - TTE and Cardiology consult as noted above       acute kidney injury-resolved  -Creatinine on admission is 1.47. No prior creatinine on file.   -Renal function has improved to 0.98 today  -Will monitor daily     Uncontrolled hypothyroidism   Pt with known hx of hypothyroidism. PTA is on levothyroxine 250mcg daily, but has not been taking for years and prior to that was only intermittently taking  - TSH is 144 and T4 is 0.18   -Continue with PTA levothyroxine at 250 mcg    -`Will recommend to follow-up with endocrinology     Mild chronic macrocytic anemia   -Hemoglobin on admission was 10.6 and in EMR last recorded value is in 2016 when it was 11.6  -B12 and iron studies are adequate  -Hemoglobin today is 10.2  -Will check for folate levels tomorrow morning and this can be due to underlying hypothyroidism       Hyperglycemia  -  HbA1c during hospitalization is 5.7    Headache  -On exam she does not have any focal deficits and I think her headache is most likely due to CO2 narcosis and the patient was on BiPAP this morning and we can use IV Toradol as needed     Morbid obesity   Body mass index is 82.18 kg/m .  Pt reports she has gained over 200lbs since the start of COVID.   - Discussed briefly recommendations for weight loss   - Treating hypothyroidism should help   - Would also recommend referral to endocrinology  "or comprehensive weight management clinic.              Diet: NPO for Medical/Clinical Reasons Except for: No Exceptions    DVT Prophylaxis: Pneumatic Compression Devices, will start subcu Lovenox  Gonzales Catheter: Not present  Lines: None     Cardiac Monitoring: ACTIVE order. Indication: Tachyarrhythmias, acute (48 hours)  Code Status: Full Code      Clinically Significant Risk Factors                         # Severe Obesity: Estimated body mass index is 84.77 kg/m  as calculated from the following:    Height as of this encounter: 1.626 m (5' 4\").    Weight as of this encounter: 224 kg (493 lb 13.3 oz)., PRESENT ON ADMISSION            Disposition Plan      Expected Discharge Date: 01/31/2024      Destination: home              Alma Christopher MD  Hospitalist Service  [facility]  Securely message with [employer] (more info)  Text page via [employer] Paging/Directory   _  She is critically ill and her prognosis is guarded and this was discussed with the patient,  and mother    _____________________________________________________________________    Interval History     Seen this morning and VBG was reviewed and it does show pH of 7.17 with pCO2 of 94 and BiPAP was ordered but patient was having headache and nausea and the Zofran did not help and she was given Compazine.  Stat ABG was ordered but could not be done due to access issues.  Discussed with patient's  and and patient and she is agreeable with BiPAP and understand the risk and benefits including aspiration.  Patient was put on BiPAP and she was seen multiple times and I had updated the  on the phone and in person few times and she was again seen in the afternoon with the pulmonary team.  Discussed repeat VBG which has not shown any improvement and plan to move to ICU to put arterial line and monitor ABGs and if no improvement then she will need to be intubated and she is on agreement    Physical Exam   Vital Signs: Temp: " 97.6  F (36.4  C) Temp src: Oral BP: 111/60 Pulse: 83   Resp: 18 SpO2: 90 % O2 Device: BiPAP/CPAP Oxygen Delivery: 4 LPM  Weight: 493 lbs 13.29 oz        General: Patient appears comfortable and in no acute distress.  HEENT: Head is atraumatic,   Respiratory: Lungs are clear to auscultation bilaterally with no wheeze or crackles   Cardiovascular: Regular rate , S1 and S2 normal with no murmer or rubs or gallops and has bilateral swelling and her edema seems to be nonpitting  Abdomen:   soft , non tender , non distended and bowel sound present and she is on nasal cannula  Neurologic: No facial droop  Musculoskeletal: Normal Range of motion over upper and lower extremities bilaterally   Psychiatric: cooperative      Medical Decision Making           Critical care time spent in management of the patient is 35 minutes and I have reviewed her labs including CBC, basic metabolic panel, multiple VBG's I discussed the plan of care in detail with the patient, nursing staff, pulmonary team and also with the  met and no other    Data     I have personally reviewed the following data over the past 24 hrs:    8.1  \   10.2 (L)   / 171     137 97 (L) 16.3 /  129 (H)   4.5 33 (H) 0.91 \       Imaging results reviewed over the past 24 hrs:   No results found for this or any previous visit (from the past 24 hour(s)).

## 2024-01-29 NOTE — CONSULTS
"                                                         Pulmonary Consult  Yodit Harvey MRN: [id number]  1980  Date of Admission:1/27/2024  Primary care provider: Lexi Salazar  ___________________________________    Yodit Harvey MRN# [id number]   YOB: 1980 Age: 43 year old   Date of Admission: 1/27/2024     Reason for consult: \"Unexplained hypoxia, recent COVID around [facility].  Morbidly obese\"         Assessment and Recommendations:     ## Probable obesity hypoventilation syndrome  ## Nocturnal hypoxia  ## Obesity    Initially presented for dizziness and falls, found to be in SVT, but also hypoxic and hypercarbic.  Overnight oximetry with 42 minutes below 88% despite supplemental oxygen.  Venous gases with pH between 7.17 and 7.21 with minimal improvement on BiPAP.  I spent significant time in the room with the patient trying to adjust the settings on her BiPAP to provide adequate ventilation, however moderate pressure settings were not sufficient to overcome her thoracic resistance, and higher settings just resulted in a greater mask leak again without achieving adequate ventilation.  This was complicated by extreme anxiety on the patient's part.  After approximately 2 hours of attempting BiPAP ventilation, her venous CO2 eulogio from 86-94 and she became less responsive.  She is at high risk of her hypercarbia progressing to respiratory failure necessitating transfer to the ICU.    Once she has recovered from this acute episode, she will need overnight assessment of CO2 accumulation by ABG.  Unfortunately due to her habitus we have not been able to obtain an ABG so she will need to have an arterial line placed have an ABG collected when she is awake, and another early in the morning after she has been sleeping without BiPAP to assess whether or not her CO2 accumulates overnight.  This will be necessary to qualify her for home BiPAP    -Continue ICU cares  -Assess for nocturnal CO2 " accumulation as described above after she is recovered  -Will almost certainly need noninvasive ventilation at discharge  -Will need full sleep medicine assessment following discharge      Aneudy Hinkle M.D.  Pulmonary & Critical Care  Pager: Click Here to page      Billing: This patient is critically ill: yes. Total critical care time today 60 min, excluding procedures personally providing and directing critical care services at the bedside and on the critical care unit.             HPI:     Yodit Harvey is a 43 year old female with HO hypothyroidism and morbid obesity admitted 1/27/2024 for dizziness and falls, found to be in SVT.  Also found to be hypoxic and hypercarbic.         Presented to the ED after 2 falls at home and reporting dizziness.  Found to be in SVT with an HR of 177.  Treated with adenosine x 2 with return to normal sinus rhythm.    Since admission has been alternating between room air, Oxymizer, and BiPAP.    Bicarb high normal on admission, has been trending up throughout this hospitalization.  Free T4 low, NT proBNP elevated.    Initially with compensated hypercarbia, however this has worsened with VBG today indicating a pH of 7.17 with a pCO2 of 94.    CT chest without abnormality.  No prior PFTs.    In hospital overnight oximetry for 6-1/2 hours revealed 42 minutes (11%) below 88%.  With the longest continuous time 4 minutes 30 seconds.  The patient was on between 1 and 8 L of oxygen during this time.           Past Medical History:     Past Medical History:   Diagnosis Date    Family history of ovarian cancer 3/3/2016    Genital herpes 05/15/2019    first outbreak.  with known cold sores for years and PCR pos for type I and antibody just barely positive at first outbreak with known exposure about 3-4 weeks prior to her outbreak    Hypothyroidism, unspecified hypothyroidism type [E03.9] 3/3/2016    Menorrhagia with regular cycle 3/3/2016    Morbid obesity, unspecified  obesity type (H) 3/3/2016    Presence of intrauterine contraceptive device (IUD) 2019    mirena placed by KENAN Salazar/S confirmation at [facility] immediately after with correct placement              Past Surgical History:      Past Surgical History:   Procedure Laterality Date     SECTION  12/10/08, 11    HC CREATE EARDRUM OPENING,GEN ANESTH      HC TOOTH EXTRACTION W/FORCEP                Social History:     Social History     Socioeconomic History    Marital status:      Spouse name: Benjie    Number of children: 2    Years of education: Not on file    Highest education level: Not on file   Occupational History    Occupation: Advocate     Employer: [employer]     Comment: [employer]   Tobacco Use    Smoking status: Never    Smokeless tobacco: Never   Substance and Sexual Activity    Alcohol use: Yes     Alcohol/week: 0.0 standard drinks of alcohol    Drug use: No    Sexual activity: Yes     Partners: Male     Birth control/protection: Condom, I.U.D.     Comment: mirena   Other Topics Concern    Parent/sibling w/ CABG, MI or angioplasty before 65F 55M? Not Asked   Social History Narrative    Not on file     Social Determinants of Health     Financial Resource Strain: Not on file   Food Insecurity: Not on file   Transportation Needs: Not on file   Physical Activity: Not on file   Stress: Not on file   Social Connections: Not on file   Interpersonal Safety: Not on file   Housing Stability: Not on file               Family History:     Family History   Problem Relation Age of Onset    Breast Cancer Maternal Grandmother         2013    Uterine Cancer Mother 48         age 48    Thyroid Disease Sister               Immunizations:     Immunization History   Administered Date(s) Administered    COVID-19 Monovalent 18+ (Moderna) 2021, 2022    Influenza (IIV3) PF 10/11/2010    Influenza Vaccine, 6+MO IM (QUADRIVALENT W/PRESERVATIVES) 2017  "             Allergies:   No Known Allergies             Medications:     Current Facility-Administered Medications   Medication    acetaminophen (TYLENOL) tablet 650 mg    Or    acetaminophen (TYLENOL) Suppository 650 mg    calcium carbonate (TUMS) chewable tablet 1,000 mg    carboxymethylcellulose PF (REFRESH PLUS) 0.5 % ophthalmic solution 1 drop    enoxaparin ANTICOAGULANT (LOVENOX) injection 40 mg    HOLD: All Oral Medications    ibuprofen (ADVIL/MOTRIN) tablet 600 mg    levothyroxine (SYNTHROID/LEVOTHROID) tablet 250 mcg    lidocaine (LMX4) cream    lidocaine (LMX4) cream    lidocaine 1 % 0.1-1 mL    lidocaine 1 % 0.1-1 mL    miconazole (MICATIN) 2 % powder    nitroGLYcerin (NITROSTAT) sublingual tablet 0.4 mg    No lozenges or gum should be given while patient on BIPAP/AVAPS/AVAPS AE    ondansetron (ZOFRAN ODT) ODT tab 4 mg    Or    ondansetron (ZOFRAN) injection 4 mg    prochlorperazine (COMPAZINE) injection 5 mg    senna-docusate (SENOKOT-S/PERICOLACE) 8.6-50 MG per tablet 1 tablet    Or    senna-docusate (SENOKOT-S/PERICOLACE) 8.6-50 MG per tablet 2 tablet    sodium chloride (PF) 0.9% PF flush 3 mL    sodium chloride (PF) 0.9% PF flush 3 mL    sodium chloride (PF) 0.9% PF flush 3 mL    sodium chloride (PF) 0.9% PF flush 3 mL               Review of Systems:     Review of Systems   Unable to perform ROS: Severe respiratory distress              Exam:   /66   Pulse 70   Temp 97.6  F (36.4  C) (Oral)   Resp 18   Ht 1.626 m (5' 4\")   Wt (!) 224 kg (493 lb 13.3 oz)   SpO2 99%   BMI 84.77 kg/m      Vitals:    01/27/24 0458 01/28/24 0629   Weight: (!) 224 kg (493 lb 13.3 oz) (!) 224 kg (493 lb 13.3 oz)         Physical Exam  Vitals and nursing note reviewed.   Constitutional:       Appearance: She is obese.   Cardiovascular:      Rate and Rhythm: Regular rhythm. Tachycardia present.      Heart sounds: No murmur heard.  Pulmonary:      Comments: Increased respiratory effort, otherwise " "clear  Musculoskeletal:      Right lower leg: Edema present.      Left lower leg: Edema present.                Data:   ROUTINE ICU LABS (Last four results)  CMP  Recent Labs   Lab 01/29/24  0653 01/28/24  0542 01/27/24  0450    140 139   POTASSIUM 4.5 4.4 4.2   CHLORIDE 97* 100 98   CO2 33* 32* 27   ANIONGAP 7 8 14   * 134* 158*   BUN 16.3 14.1 16.1   CR 0.91 0.98* 1.47*   GFRESTIMATED 79 72 44*   BECKY 9.2 9.5 9.3   MAG  --   --  2.0   PROTTOTAL  --   --  7.6   ALBUMIN  --   --  4.2   BILITOTAL  --   --  0.3   ALKPHOS  --   --  51   AST  --   --  37   ALT  --   --  27     CBC  Recent Labs   Lab 01/29/24  0653 01/28/24  0542 01/27/24  0450   WBC 8.1 8.2 11.0   RBC 3.21* 3.47* 3.41*   HGB 10.2* 10.8* 10.6*   HCT 33.8* 36.3 34.3*   * 105* 101*   MCH 31.8 31.1 31.1   MCHC 30.2* 29.8* 30.9*   RDW 15.9* 16.1* 15.7*    210 234     INFLAMMATIONNo lab results found in last 7 days.    Invalid input(s): \"ESR\"    INRNo lab results found in last 7 days.  Arterial Blood Gas  Recent Labs   Lab 01/29/24  0853 01/29/24  0653 01/27/24  0858   O2PER 4 4 2       ALL CULTURESNo results for input(s): \"CULT\" in the last 168 hours.           Imaging:     CT chest 1/27/2024  1.  No pulmonary embolism demonstrated.  2.  Mild dependent probable atelectasis versus less likely infiltrate.    Overnight oximetry 1/28 On 5L oximask        The above note was dictated using voice recognition software and may include typographical errors. Please contact the author for any clarifications.    "

## 2024-01-29 NOTE — PROGRESS NOTES
Cared for patient [id number]. Repositioned per patient request. Purewick in place. Sinus rhythm. Oxymask on 5L due to desatting to 40-60% when asleep. Patient oriented x 4 but drowsy at times. Lab VBG ordered but multiple lab techs have tried and have been unsuccessful thus far, called lab to have them come try again. Plan of care ongoing.

## 2024-01-30 ENCOUNTER — APPOINTMENT (OUTPATIENT)
Dept: INTERVENTIONAL RADIOLOGY/VASCULAR | Facility: CLINIC | Age: 44
End: 2024-01-30
Attending: NURSE PRACTITIONER
Payer: COMMERCIAL

## 2024-01-30 ENCOUNTER — APPOINTMENT (OUTPATIENT)
Dept: PHYSICAL THERAPY | Facility: CLINIC | Age: 44
End: 2024-01-30
Payer: COMMERCIAL

## 2024-01-30 LAB
ANION GAP SERPL CALCULATED.3IONS-SCNC: 7 MMOL/L (ref 7–15)
BASE EXCESS BLDV CALC-SCNC: 6.5 MMOL/L (ref -3–3)
BASE EXCESS BLDV CALC-SCNC: 7.6 MMOL/L (ref -3–3)
BASE EXCESS BLDV CALC-SCNC: 8.1 MMOL/L (ref -3–3)
BASE EXCESS BLDV CALC-SCNC: 9.7 MMOL/L (ref -3–3)
BUN SERPL-MCNC: 17.6 MG/DL (ref 6–20)
CALCIUM SERPL-MCNC: 8.9 MG/DL (ref 8.6–10)
CHLORIDE SERPL-SCNC: 100 MMOL/L (ref 98–107)
CREAT SERPL-MCNC: 0.94 MG/DL (ref 0.51–0.95)
DEPRECATED HCO3 PLAS-SCNC: 32 MMOL/L (ref 22–29)
EGFRCR SERPLBLD CKD-EPI 2021: 76 ML/MIN/1.73M2
ERYTHROCYTE [DISTWIDTH] IN BLOOD BY AUTOMATED COUNT: 15.7 % (ref 10–15)
GLUCOSE BLDC GLUCOMTR-MCNC: 161 MG/DL (ref 70–99)
GLUCOSE BLDC GLUCOMTR-MCNC: 74 MG/DL (ref 70–99)
GLUCOSE BLDC GLUCOMTR-MCNC: 82 MG/DL (ref 70–99)
GLUCOSE BLDC GLUCOMTR-MCNC: 85 MG/DL (ref 70–99)
GLUCOSE SERPL-MCNC: 93 MG/DL (ref 70–99)
HCO3 BLDV-SCNC: 33 MMOL/L (ref 21–28)
HCO3 BLDV-SCNC: 36 MMOL/L (ref 21–28)
HCO3 BLDV-SCNC: 36 MMOL/L (ref 21–28)
HCO3 BLDV-SCNC: 37 MMOL/L (ref 21–28)
HCT VFR BLD AUTO: 30.5 % (ref 35–47)
HGB BLD-MCNC: 9.2 G/DL (ref 11.7–15.7)
MCH RBC QN AUTO: 31.6 PG (ref 26.5–33)
MCHC RBC AUTO-ENTMCNC: 30.2 G/DL (ref 31.5–36.5)
MCV RBC AUTO: 105 FL (ref 78–100)
O2/TOTAL GAS SETTING VFR VENT: 100 %
O2/TOTAL GAS SETTING VFR VENT: 25 %
O2/TOTAL GAS SETTING VFR VENT: 45 %
O2/TOTAL GAS SETTING VFR VENT: 50 %
OXYHGB MFR BLDV: 41 % (ref 70–75)
OXYHGB MFR BLDV: 45 % (ref 70–75)
OXYHGB MFR BLDV: 50 % (ref 70–75)
OXYHGB MFR BLDV: 64 % (ref 70–75)
PCO2 BLDV: 45 MM HG (ref 40–50)
PCO2 BLDV: 62 MM HG (ref 40–50)
PCO2 BLDV: 75 MM HG (ref 40–50)
PCO2 BLDV: 82 MM HG (ref 40–50)
PH BLDV: 7.25 [PH] (ref 7.32–7.43)
PH BLDV: 7.29 [PH] (ref 7.32–7.43)
PH BLDV: 7.38 [PH] (ref 7.32–7.43)
PH BLDV: 7.47 [PH] (ref 7.32–7.43)
PLATELET # BLD AUTO: 153 10E3/UL (ref 150–450)
PO2 BLDV: 24 MM HG (ref 25–47)
PO2 BLDV: 26 MM HG (ref 25–47)
PO2 BLDV: 29 MM HG (ref 25–47)
PO2 BLDV: 31 MM HG (ref 25–47)
POTASSIUM SERPL-SCNC: 4.5 MMOL/L (ref 3.4–5.3)
RBC # BLD AUTO: 2.91 10E6/UL (ref 3.8–5.2)
SAO2 % BLDV: 41.4 % (ref 70–75)
SAO2 % BLDV: 45.3 % (ref 70–75)
SAO2 % BLDV: 50.6 % (ref 70–75)
SAO2 % BLDV: 65.6 % (ref 70–75)
SODIUM SERPL-SCNC: 139 MMOL/L (ref 135–145)
VIT B12 SERPL-MCNC: 311 PG/ML (ref 232–1245)
WBC # BLD AUTO: 6.5 10E3/UL (ref 4–11)

## 2024-01-30 PROCEDURE — 258N000003 HC RX IP 258 OP 636: Performed by: NURSE PRACTITIONER

## 2024-01-30 PROCEDURE — 250N000011 HC RX IP 250 OP 636: Mod: JZ | Performed by: NURSE PRACTITIONER

## 2024-01-30 PROCEDURE — 999N000157 HC STATISTIC RCP TIME EA 10 MIN

## 2024-01-30 PROCEDURE — 82805 BLOOD GASES W/O2 SATURATION: CPT | Performed by: INTERNAL MEDICINE

## 2024-01-30 PROCEDURE — 80048 BASIC METABOLIC PNL TOTAL CA: CPT | Performed by: HOSPITALIST

## 2024-01-30 PROCEDURE — 82805 BLOOD GASES W/O2 SATURATION: CPT | Performed by: NURSE PRACTITIONER

## 2024-01-30 PROCEDURE — 94660 CPAP INITIATION&MGMT: CPT

## 2024-01-30 PROCEDURE — 999N000040 HC STATISTIC CONSULT NO CHARGE VASC ACCESS

## 2024-01-30 PROCEDURE — 272N000452 HC KIT SHRLOCK 5FR POWER PICC TRIPLE LUMEN

## 2024-01-30 PROCEDURE — 99233 SBSQ HOSP IP/OBS HIGH 50: CPT | Performed by: INTERNAL MEDICINE

## 2024-01-30 PROCEDURE — 99232 SBSQ HOSP IP/OBS MODERATE 35: CPT | Performed by: HOSPITALIST

## 2024-01-30 PROCEDURE — 36573 INSJ PICC RS&I 5 YR+: CPT

## 2024-01-30 PROCEDURE — 99233 SBSQ HOSP IP/OBS HIGH 50: CPT | Performed by: NURSE PRACTITIONER

## 2024-01-30 PROCEDURE — 36415 COLL VENOUS BLD VENIPUNCTURE: CPT | Performed by: NURSE PRACTITIONER

## 2024-01-30 PROCEDURE — 97530 THERAPEUTIC ACTIVITIES: CPT | Mod: GP

## 2024-01-30 PROCEDURE — C1751 CATH, INF, PER/CENT/MIDLINE: HCPCS

## 2024-01-30 PROCEDURE — 02HV33Z INSERTION OF INFUSION DEVICE INTO SUPERIOR VENA CAVA, PERCUTANEOUS APPROACH: ICD-10-PCS | Performed by: RADIOLOGY

## 2024-01-30 PROCEDURE — 36415 COLL VENOUS BLD VENIPUNCTURE: CPT | Performed by: HOSPITALIST

## 2024-01-30 PROCEDURE — 85048 AUTOMATED LEUKOCYTE COUNT: CPT | Performed by: HOSPITALIST

## 2024-01-30 PROCEDURE — 200N000001 HC R&B ICU

## 2024-01-30 PROCEDURE — 82607 VITAMIN B-12: CPT | Performed by: NURSE PRACTITIONER

## 2024-01-30 PROCEDURE — 250N000011 HC RX IP 250 OP 636: Performed by: INTERNAL MEDICINE

## 2024-01-30 PROCEDURE — 250N000011 HC RX IP 250 OP 636: Performed by: HOSPITALIST

## 2024-01-30 RX ORDER — FUROSEMIDE 10 MG/ML
40 INJECTION INTRAMUSCULAR; INTRAVENOUS ONCE
Status: COMPLETED | OUTPATIENT
Start: 2024-01-30 | End: 2024-01-30

## 2024-01-30 RX ORDER — FOLIC ACID 1 MG/1
1 TABLET ORAL DAILY
Status: DISCONTINUED | OUTPATIENT
Start: 2024-01-30 | End: 2024-02-06 | Stop reason: HOSPADM

## 2024-01-30 RX ORDER — ACETAZOLAMIDE 500 MG/5ML
500 INJECTION, POWDER, LYOPHILIZED, FOR SOLUTION INTRAVENOUS ONCE
Status: COMPLETED | OUTPATIENT
Start: 2024-01-30 | End: 2024-01-30

## 2024-01-30 RX ADMIN — DEXTROSE AND SODIUM CHLORIDE: 5; 900 INJECTION, SOLUTION INTRAVENOUS at 11:05

## 2024-01-30 RX ADMIN — ACETAZOLAMIDE SODIUM 500 MG: 500 INJECTION, POWDER, LYOPHILIZED, FOR SOLUTION INTRAVENOUS at 17:22

## 2024-01-30 RX ADMIN — KETOROLAC TROMETHAMINE 15 MG: 15 INJECTION, SOLUTION INTRAMUSCULAR; INTRAVENOUS at 13:20

## 2024-01-30 RX ADMIN — MICONAZOLE NITRATE: 2 POWDER TOPICAL at 21:54

## 2024-01-30 RX ADMIN — KETOROLAC TROMETHAMINE 15 MG: 15 INJECTION, SOLUTION INTRAMUSCULAR; INTRAVENOUS at 03:52

## 2024-01-30 RX ADMIN — MICONAZOLE NITRATE: 2 POWDER TOPICAL at 09:11

## 2024-01-30 RX ADMIN — ENOXAPARIN SODIUM 40 MG: 40 INJECTION SUBCUTANEOUS at 03:52

## 2024-01-30 RX ADMIN — ENOXAPARIN SODIUM 40 MG: 40 INJECTION SUBCUTANEOUS at 17:22

## 2024-01-30 ASSESSMENT — ACTIVITIES OF DAILY LIVING (ADL)
ADLS_ACUITY_SCORE: 34

## 2024-01-30 NOTE — PROGRESS NOTES
Contacted now about placing PICC again. Explained to bedside RN that pt has very limited access choices. Brachial and basilc veins are very deep and it is unlikely that needle is long enough to reach those vessels. Cephalic was the only vein that is more superficial, however the vessel is extremely tortuous and shrinks in size intermittently, ruling it out as a viable vein for PICC. Bedside RN to speak to MD regarding best option for vascular access for this pt. Will continue to monitor.

## 2024-01-30 NOTE — PROGRESS NOTES
[facility]    Medicine Progress Note - Hospitalist Service    Date of Admission:  1/27/2024    Assessment & Plan     Yodit Harvey is a 43 year old female with past medical history significant for hypothyroidism and morbid obesity admitted on 1/27/2024 with dizziness and falls, found to be in SVT. She is also found to have mild hypoxia and compensated hypercapnia.           Respiratory failure with hypoxemia and hypercapnia likely due to OHS and JL  Respiratory acidosis  Hypercapnia- suspect chronic, likely OHS and JL   History of recent COVID-19 infection in December 2023  Encephalopathy likely due to hypercapnia  -On admission patient endorsed some shortness of breath following conversion to NSR. O2 sats in the ED down to mid 80s at times. VBG showed elevated pCO2 to 62 but with a normal pH, so this is presumed chronic.   -CXR was unremarkable but limited due to body habitus.   - CTPE negative for pulmonary embolism. Mild dependent probable atelectasis vs less likely infiltrate.   -It was thought that her mild hypoxia and hypercapnia is chronic likely due to obesity hypoventilation syndrome, likely due to undiagnosed sleep apnea  -Respiratory panel was checked on admission and was negative  --Echo on admission showed EF of 55 to 60%, LV diastolic function is indeterminate and mild RV volume overload  -She did had mildly elevated proBNP on admission and I did order repeat as per request of her  and it is near same as before and cardiology did not think that patient needed any diuresis  -On exam she does seem to have bilateral lymphedema and lymphedema therapy has been ordered  -VBG's have been done on the patient and on 1/29/2024 she was found to have respiratory acidosis with hypercapnia with initiation of BiPAP and pulmonary team was consulted and patient was moved to ICU as she had severe decompensation in form of encephalopathy and she was continued with  BiPAP  -1/30/2024-patient had midline placed which seems to be not functioning well this morning and patient is followed by the pulmonary and the ICU team and VBG's have been reviewed this morning and pulmonary and ICU team did adjust her BiPAP settings with improvement in her blood gas.  They have also contacted IR for better access.  We will continue to monitor how she does and  continue to follow blood gases  -given that patient has been dependent on BiPAP and has not eaten fluids were ordered with D5 and off note patient's echo was reviewed by pulmonary team      SVT   Dizziness, falls likely 2/2 above   Pt presented to the ED after two falls. The first fall she reports was a trip and fall, however when getting up she started feeling dizzy so sat down. When she got up she again started to feel dizzy and was assisted to the ground by her . She thinks though that she might have passed out briefly during the second fall. When EMS arrived her HR was 177.   - EKG in the ED showed SVT. She was given adenosine x2 and she eventually returned to a normal sinus rhythm.   -Patient evaluated by the cardiology team and initial differentials included heart failure with preserved ejection fraction versus high-output heart failure and they were not concerned about ischemia and they recommended to treat her underlying thyroid issues  -Patient was being monitored in the hospital and troponins peaked at 170 and they have trended down   -Echo on admission showed EF of 55 to 60%, LV diastolic function is indeterminate and mild RV volume overload  -Heart rate seems to be stable for now    Troponin elevation likely due to demand  -On admission presented without any chest pain and troponins peaked at around 170s and trended down   - TTE and Cardiology consult as noted above       acute kidney injury-resolved  -Creatinine on admission is 1.47. No prior creatinine on file.   -Renal function has improved to 0.94 today  -Will  "monitor daily     Uncontrolled hypothyroidism   Pt with known hx of hypothyroidism. PTA is on levothyroxine 250mcg daily, but has not been taking for years and prior to that was only intermittently taking  - TSH is 144 and T4 is 0.18   -Continue with PTA levothyroxine at 250 mcg    -`Will recommend to follow-up with endocrinology     Mild chronic macrocytic anemia   -Hemoglobin on admission was 10.6 and in EMR last recorded value is in 2016 when it was 11.6  -B12 and iron studies are adequate  -Hemoglobin today is 9.2  -Will check for folate levels tomorrow morning and this can be due to underlying hypothyroidism       Hyperglycemia  -  HbA1c during hospitalization is 5.7    Headache  -On exam she does not have any focal deficits and I think her headache is most likely due to CO2 narcosis and the patient was on BiPAP this morning and we can use IV Toradol as needed     Morbid obesity   Body mass index is 82.18 kg/m .  Pt reports she has gained over 200lbs since the start of COVID.   - Discussed briefly recommendations for weight loss   - Treating hypothyroidism should help   - Would also recommend referral to endocrinology or comprehensive weight management clinic.              Diet: NPO for Medical/Clinical Reasons Except for: Meds    DVT Prophylaxis: Pneumatic Compression Devices, will start subcu Lovenox  Gonzales Catheter: PRESENT, indication: Strict 1-2 Hour I&O;Retention  Lines: PRESENT           Cardiac Monitoring: ACTIVE order. Indication: ICU  Code Status: Full Code      Clinically Significant Risk Factors                         # Severe Obesity: Estimated body mass index is 84.77 kg/m  as calculated from the following:    Height as of this encounter: 1.626 m (5' 4\").    Weight as of this encounter: 224 kg (493 lb 13.3 oz)., PRESENT ON ADMISSION            Disposition Plan     Expected Discharge Date: 01/31/2024      Destination: home              Alma Christopher MD  Hospitalist Service  [facility] " [facility]  Securely message with [employer] (more info)  Text page via [employer] Paging/Directory   _  She is critically ill and her prognosis is guarded     _____________________________________________________________________    Interval History     Seen this morning with the patient's nurse, pulmonary team and also discussed with ICU team and she has been dependent on BiPAP with improvement in blood gases.  Patient did complain that she was having some shakiness and she was found to have blood sugar of 85 and will be started on IV fluids    I spoke with andres lowry at 445     Physical Exam   Vital Signs: Temp: 99  F (37.2  C) Temp src: Bladder BP: 110/74 Pulse: 59   Resp: 17 SpO2: 100 % O2 Device: BiPAP/CPAP    Weight: 493 lbs 13.29 oz        General: Patient appears comfortable and in no acute distress.  HEENT: Head is atraumatic,   Respiratory: Lungs are clear to auscultation bilaterally with no wheeze or crackles   Cardiovascular: Regular rate , S1 and S2 normal with no murmer or rubs or gallops and has bilateral swelling and her edema seems to be nonpitting  Abdomen:   soft , non tender , non distended and bowel sound present and she is on nasal cannula  Neurologic: No facial droop  Musculoskeletal: Normal Range of motion over upper and lower extremities bilaterally   Psychiatric: cooperative      Medical Decision Making           Time spent in care of patient is 45 minutes and reviewed labs and discussed plan of care in detail with the patient and the nursing staff.  I also called the  later in the day today.    Data     I have personally reviewed the following data over the past 24 hrs:    6.5  \   9.2 (L)   / 153     139 100 17.6 /  85   4.5 32 (H) 0.94 \       Imaging results reviewed over the past 24 hrs:   No results found for this or any previous visit (from the past 24 hour(s)).

## 2024-01-30 NOTE — PROGRESS NOTES
Critical Care Progress Note      01/30/2024    Name: Yodit Harvey MRN#: [id number]   Age: 43 year old YOB: 1980     Hsptl Day# 3  ICU DAY #    MV DAY #             Problem List:   Principal Problem:    Hypoxia  Active Problems:    SVT (supraventricular tachycardia)           Summary/Hospital Course:   Yodit Harvey is a 43 year old female with past medical history significant for hypothyroidism and morbid obesity admitted on 1/27/2024 with dizziness and falls, found to be in SVT. She is also found to be in hypoxemic hypercarbic respiratory failure. Transferred to the ICU 1/29 for placement of arterial line, ABG monitoring to seek qualification for home BIPAP           Assessment and plan :     Yodit Harvey IS a 43 year old female admitted on 1/27/2024 for hypercarbic respiratory failure.   I have personally reviewed the daily labs, imaging studies, cultures and discussed the case with referring physician and consulting physicians.     My assessment and plan by system for this patient is as follows:    Neurology/Psychiatry:   No issues. Awake, mentating well.   Mobilize    Cardiovascular:   1.Hemodynamics -normotensive  2.Rhythm - SR  3.elevated troponin on admission evaluated by cardiology. Peaked at 170s and trended down. EF 55 to 60%  4. SVT on admission, adenosine x 2  Plan  None    Pulmonary/Ventilator Management:   #Hypercarbic respiratory failure  #Possible obesity hypoventilation syndrome  #Obesity  1. Airway patent  2. Oxygenation/ventilation/mechanics  Plan  - VBG 7.29/75/26/45 this am. BIPAP increased to 22/8 with increase in 1.5 L in MV.   - Follow up VBG in 2 hours  - Seen by pulmonology 1/29. Will need nocturnal CO2 accumulation evaluation after recovery. Will need sleep medicine consult.   - Will hold on arterial line placement. Order PICC line.     GI and Nutrition :   #Obesity  1. NPO for now while on BIPAP continously      Renal/Fluids/Electrolytes:   1. Recovered SUELLEN.  Renal panel normal. Bicarb noted 27-->32 over last 72 hours  2.Volume status- euvolemic  Plan  - Diurese with 40 of lasix and 500 of diamox  - monitor function and electrolytes as needed with replacement per ICU protocols.  - generally avoid nephrotoxic agents such as NSAID, IV contrast unless specifically required  - adjust medications as needed for renal clearance  - follow I/O's as appropriate.    Infectious Disease:   1.No leukocytosis, afebrile. No s/s infection    Endocrine:   1. Hypothyroidism    Plan  - Pt with known hx of hypothyroidism. PTA is on levothyroxine 250mcg daily, but has not been taking for years and prior to that was only intermittently taking  - TSH is 144 and T4 is 0.18   -Continue with PTA levothyroxine at 250 mcg    -`Will recommend to follow-up with endocrinology    Hematology/Oncology:   1.Macrocytic Anemia, no signs, symptoms of active blood loss    Plan  - Send vitamin b 12, folate levels  - Start empiric folic acid  - DVT prophylaxis: Lovenox 40 mg BID based on BMI      MSKL/Rheum:  #hx of falls  Mobilize  PT/OT consults      IV/Access:   1. Venous access - Midline, PIV  2. Arterial access - None  Plan  - None      ICU Prophylaxis:   1. DVT: Mechanical, Lovenox  2. VAP: HOB 30 degrees  3. Stress Ulcer: Not indicated  4. Restraints: None  5. Wound care - per unit routine   6. Feeding - NPO  7. Family Update:Pending  8. Disposition - Likely transfer if VBG stable        Key goals for next 24 hours:   Titrate BIPAP settings to adequately ventilate  mobilization               Interim History:              Key Medications:      enoxaparin ANTICOAGULANT  40 mg Subcutaneous Q12H    levothyroxine  250 mcg Oral QAM AC    miconazole   Topical BID    sodium chloride (PF)  10 mL Intracatheter Q8H    sodium chloride (PF)  10-40 mL Intracatheter Q7 Days    sodium chloride (PF)  3 mL Intracatheter Q8H      lactated ringers 75 mL/hr at 01/29/24 5243    - MEDICATION INSTRUCTIONS -                  Physical Examination:   Temp:  [97.7  F (36.5  C)-98.4  F (36.9  C)] 98.4  F (36.9  C)  Pulse:  [56-83] 72  Resp:  [9-27] 15  BP: ()/(35-74) 109/57  FiO2 (%):  [45 %-65 %] 50 %  SpO2:  [51 %-100 %] 97 %    Intake/Output Summary (Last 24 hours) at 1/30/2024 0930  Last data filed at 1/30/2024 0600  Gross per 24 hour   Intake 567.5 ml   Output 810 ml   Net -242.5 ml     Wt Readings from Last 4 Encounters:   01/28/24 (!) 224 kg (493 lb 13.3 oz)   06/14/19 (!) 150.4 kg (331 lb 9.6 oz)   05/15/19 149.1 kg (328 lb 12.8 oz)   04/10/19 (!) 153.4 kg (338 lb 3.2 oz)     BP - Mean:  [] 71  FiO2 (%): 50 %  Resp: 15    Recent Labs   Lab 01/30/24  0424 01/29/24  2327 01/29/24  1818 01/29/24  1431   O2PER 50 100 75 50       GEN: no acute distress   HEENT: head ncat, sclera anicteric, OP patent, trachea midline   PULM: unlabored synchronous  clear anteriorly    CV/COR: RRR S1S2 no gallop,  No rub, no murmur  ABD: soft nontender, hypoactive bowel sounds, no mass  EXT:  Edema   warm  NEURO: grossly intact  SKIN: no obvious rash  LINES: clean, dry intact         Data:   All data and imaging reviewed     ROUTINE ICU LABS (Last four results)  CMP  Recent Labs   Lab 01/30/24  0910 01/30/24  0718 01/30/24  0415 01/30/24  0003 01/29/24  1639 01/29/24  0653 01/28/24  0542 01/27/24  0450   NA  --  139  --   --   --  137 140 139   POTASSIUM  --  4.5  --   --   --  4.5 4.4 4.2   CHLORIDE  --  100  --   --   --  97* 100 98   CO2  --  32*  --   --   --  33* 32* 27   ANIONGAP  --  7  --   --   --  7 8 14   GLC 85 93 82 74   < > 129* 134* 158*   BUN  --  17.6  --   --   --  16.3 14.1 16.1   CR  --  0.94  --   --   --  0.91 0.98* 1.47*   GFRESTIMATED  --  76  --   --   --  79 72 44*   BECKY  --  8.9  --   --   --  9.2 9.5 9.3   MAG  --   --   --   --   --   --   --  2.0   PROTTOTAL  --   --   --   --   --   --   --  7.6   ALBUMIN  --   --   --   --   --   --   --  4.2   BILITOTAL  --   --   --   --   --   --   --  0.3   ALKPHOS  --   --    "--   --   --   --   --  51   AST  --   --   --   --   --   --   --  37   ALT  --   --   --   --   --   --   --  27    < > = values in this interval not displayed.     CBC  Recent Labs   Lab 01/30/24  0718 01/29/24  0653 01/28/24  0542 01/27/24  0450   WBC 6.5 8.1 8.2 11.0   RBC 2.91* 3.21* 3.47* 3.41*   HGB 9.2* 10.2* 10.8* 10.6*   HCT 30.5* 33.8* 36.3 34.3*   * 105* 105* 101*   MCH 31.6 31.8 31.1 31.1   MCHC 30.2* 30.2* 29.8* 30.9*   RDW 15.7* 15.9* 16.1* 15.7*    171 210 234     INRNo lab results found in last 7 days.  Arterial Blood Gas  Recent Labs   Lab 01/30/24  0424 01/29/24  2327 01/29/24  1818 01/29/24  1431   O2PER 50 100 75 50       All cultures:  No results for input(s): \"CULT\" in the last 168 hours.  No results found for this or any previous visit (from the past 24 hour(s)).      Billing: This patient is critically ill: Yes. Total critical care time today 30 min.            "

## 2024-01-30 NOTE — PROCEDURES
[facility]    Single Lumen Midline Placement    Date/Time: 1/29/2024 7:06 PM    Performed by: Davina Posada RN  Authorized by: Andrea Junior MD  Indications: vascular access      UNIVERSAL PROTOCOL   Site Marked: Yes  Prior Images Obtained and Reviewed:  Yes  Required items: Required blood products, implants, devices and special equipment available    Patient identity confirmed:  Verbally with patient, provided demographic data and hospital-assigned identification number  NA - No sedation, light sedation, or local anesthesia  Confirmation Checklist:  Patient's identity using two indicators, relevant allergies and procedure was appropriate and matched the consent or emergent situation  Time out: Immediately prior to the procedure a time out was called    Universal Protocol: the Joint Commission Universal Protocol was followed    Preparation: Patient was prepped and draped in usual sterile fashion    ESBL (mL):  0     ANESTHESIA    Anesthesia:  Local infiltration  Local Anesthetic:  Lidocaine 1% without epinephrine  Anesthetic Total (mL):  1      SEDATION    Patient Sedated: No        Preparation: skin prepped with ChloraPrep  Skin prep agent: skin prep agent completely dried prior to procedure  Sterile barriers: maximum sterile barriers were used: cap, mask, sterile gown, sterile gloves, and large sterile sheet  Hand hygiene: hand hygiene performed prior to central venous catheter insertion  Type of line used: Midline  Catheter type: single lumen  Lumen type: non-valved  Catheter size: 3 Fr  Brand: Bard  Placement method: venipuncture, MST and ultrasound  Number of attempts: 2  Difficulty threading catheter: yes  Successful placement: yes  Location: cephalic vein  Tip Location: distal to axillary vein  Arm circumference: adults 10 cm  Extremity circumference: 74  Visible catheter length: 2  Internal length: 18 cm  Total catheter length: 20  Dressing and securement: blood  removed, chlorhexidine disc applied, dressing applied, gloves changed prior to final dressing, secured with tape, taped and transparent securement dressing  Post procedure assessment: blood return through all ports and free fluid flow    UNABLE TO PLACE PICC BOTH SIDE IV IN RIGHT. MIDLINE IN LEFT DR BEATRICE    A midline catheter is a form of peripheral venous access. Not recommended for the infusion of vesicants (Vancomycin, Vasopressors, TPN, etc.)

## 2024-01-30 NOTE — PROGRESS NOTES
In room preparing to place PICC as ordered. Just after opening PICC kit bedside RN entered room and notified VAT RN's that MD was cancelling PICC order and is planning to place an art line. Aborted procedure as ordered. Will continue to monitor.

## 2024-01-30 NOTE — IR NOTE
[facility]  POST PROCEDURE NOTE      Procedure: RUE triple lumen PICC placed in the Basilic Vein.  3 lumes aspirated normally and flushed with normal saline.  If trouble flushing/aspirating recommend redo of dressing as PICC may kink superficially.    PICC ready for use.  TIp at RA/SVC junction.  50 cm length, 2 cm external.    Physician: Daly    Type of Sedation: None    Estimated Blood Loss: 5 ml      Complications/Reactions: None    Plan: PICC ready for use.      Nicanor Escoto MD   1/30/2024, 5:12 PM

## 2024-01-30 NOTE — PROGRESS NOTES
[facility]    Pulmonary, Allergy, Critical Care and Sleep Medicine    Pulmonary Consult Progress Note    Yodit Harvey MRN# [id number]   Age: 43 year old YOB: 1980     Date of Admission: 1/27/2024  Date of Service: 01/30/2024     ==================================================  INTERVAL EVENTS:  -Persistent respiratory acidosis pH/pCO2 with slight improvement overnight  -BiPAP pressure settings increased this morning with improved CO2 clearance       CHANGES FOR TODAY:  -Monitor mental status and trend VBG      ==================================================    ASSESSMENT AND RECOMMENDATIONS:    ## Probable obesity hypoventilation syndrome  ## Nocturnal hypoxia  ## Obesity     Initially presented for dizziness and falls, found to be in SVT, but also hypoxic and hypercarbic.  Overnight oximetry with 42 minutes below 88% despite supplemental oxygen.  Venous gases 1/29 with pH between 7.17 and 7.21 with minimal improvement on BiPAP so she was transferred to the ICU for closer monitoring.    Once she has recovered from this acute episode, she will need overnight assessment of CO2 accumulation by ABG.  Unfortunately due to her habitus we have not been able to obtain an ABG so she will need to have an arterial line placed have an ABG collected when she is awake, and another early in the morning after she has been sleeping without BiPAP to assess whether or not her CO2 accumulates overnight.  This will be necessary to qualify her for home BiPAP     -Continue BiPAP  -Target oxygen saturations between 88 and 92%  -Assess for nocturnal CO2 accumulation as described above after she is recovered  -Will almost certainly need noninvasive ventilation at discharge  -Will need full sleep medicine assessment following discharge           Aneudy Hinkle M.D.  Pulmonary & Critical Care  Pager: Click Here to page      I spent 50 minutes dedicated to this care so far today excluding  "procedures, including review of medical records, review of imaging (results & images), time with patient and time in documentation.      ==================================================      PHYSICAL EXAM  /57 (BP Location: Right arm)   Pulse 72   Temp 98.4  F (36.9  C)   Resp 15   Ht 1.626 m (5' 4\")   Wt (!) 224 kg (493 lb 13.3 oz)   SpO2 97%   BMI 84.77 kg/m        Intake/Output Summary (Last 24 hours) at 1/30/2024 0931  Last data filed at 1/30/2024 0600  Gross per 24 hour   Intake 567.5 ml   Output 810 ml   Net -242.5 ml       Vitals:    01/27/24 0458 01/28/24 0629   Weight: (!) 224 kg (493 lb 13.3 oz) (!) 224 kg (493 lb 13.3 oz)         General: Alert, interactive, NAD  Resp: CTAB, no crackles or wheezes  Cardiac: Tachy, regular, NS1,S2, No m/r/g  Extremities: No LE edema or obvious joint abnormalities  Skin: Warm and dry, no jaundice or rash  Neuro: Alert & oriented x 3, Cns 2-12 intact, moves all extremities equally      Recent Labs   Lab Test 01/30/24  0718 01/29/24  0653 01/28/24  0542   WBC 6.5 8.1 8.2   RBC 2.91* 3.21* 3.47*   HGB 9.2* 10.2* 10.8*    171 210       Recent Labs   Lab Test 01/30/24  0910 01/30/24  0718 01/30/24  0415 01/29/24  1639 01/29/24  0653 01/28/24  0542 01/27/24  0450   NA  --  139  --   --  137 140 139   POTASSIUM  --  4.5  --   --  4.5 4.4 4.2   CHLORIDE  --  100  --   --  97* 100 98   CO2  --  32*  --   --  33* 32* 27   BUN  --  17.6  --   --  16.3 14.1 16.1   CR  --  0.94  --   --  0.91 0.98* 1.47*   GLC 85 93 82   < > 129* 134* 158*   BECKY  --  8.9  --   --  9.2 9.5 9.3   MAG  --   --   --   --   --   --  2.0    < > = values in this interval not displayed.           No results for input(s): \"CULT\" in the last 168 hours.      No results found for this or any previous visit (from the past 48 hour(s)).      "

## 2024-01-30 NOTE — IR NOTE
Interventional Radiology Intra-procedural Nursing Note    Patient Name: Yodit Harvey  Medical Record Number: [id number]  Today's Date: January 30, 2024    Procedure: Right Arm PICC Placement  Start time: 1642  End time: 1700  Report provided to: Gay TOBIAS    Note: Patient entered Interventional Radiology Suite number 1 via cart. Patient awake, alert and orientated. Assisted onto procedural table in semi-sitting position. Prepped and draped.  Dr. Escoto in room. Time out and procedure started. Vital signs stable. Telemetry reading NSR.    Procedure well tolerated by patient without complications. Procedure end with debrief by Dr. Escoto.    Measured between 650 and 660 mm in circumference at site.    Extra time was taken by staff to ensure pt returned to cot safely, extending pt's time in department and overall transfer time.      Administered medication totals:  Lidocaine 1% 8 mL Intradermal

## 2024-01-30 NOTE — CONSULTS
Patient is on IR schedule today 1/30 or tomorrow 1/31 for a PICC line placement with local.     Vascular access evaluated Yodit's arms and did not feel they had needles long enough to get into the Veins. Patient will need to lay flat or nearly flat on IR table for procedure to use fluoro and from chart review and d/w ICU RN patient doesn't tolerate laying flat as she has apnea and doesn't tolerate.     Will follow for placement attempt most likely tomorrow.     -Labs WNL for procedure.    -No NPO required.   -Consent will be done prior to procedure.     Please contact the IR department at [phone] for procedural related questions.     Discussed with IR Dr Kauffman and ICU RN today.    Total time: 20 minutes    Thanks, Sena [facility] Interventional Radiology CNP ([phone]) (phone [phone])

## 2024-01-30 NOTE — PLAN OF CARE
Problem: Gas Exchange Impaired  Goal: Optimal Gas Exchange  Outcome: Progressing  Intervention: Optimize Oxygenation and Ventilation  Recent Flowsheet Documentation  Taken 1/30/2024 0400 by Estella Casillas RN  Head of Bed (HOB) Positioning: HOB at 30-45 degrees  Taken 1/30/2024 0200 by Estella Casillas RN  Head of Bed (HOB) Positioning: HOB at 30-45 degrees  Taken 1/30/2024 0000 by Estella Casillas RN  Head of Bed (HOB) Positioning: HOB at 30-45 degrees  Taken 1/29/2024 2200 by Estella Casillas RN  Head of Bed (HOB) Positioning: HOB at 30-45 degrees  Taken 1/29/2024 2000 by Estella Casillas RN  Head of Bed (HOB) Positioning: HOB at 30-45 degrees   Goal Outcome Evaluation:      Plan of Care Reviewed With: patient, spouse    Overall Patient Progress: improvingOverall Patient Progress: improving    Outcome Evaluation: Pt alert & oriented x 4. Sating upper 90s on cont BIPAP. Tolarating well. Gonzales placed due to retention and difficulty with straight cath. Maint fluid started, VSS. Afebrile. No BM overnight. Adequate Urine output- decreasing throughout the shift. Pt unable to tolerate full bath due to position of HOB.

## 2024-01-30 NOTE — PLAN OF CARE
Goal Outcome Evaluation:      Plan of Care Reviewed With: patient, spouse    Overall Patient Progress: decliningOverall Patient Progress: declining    Outcome Evaluation: Shift [id number]: pt increasingly lethargic. BIPAP on upon arrival, currently 20/8, O2 at 75%. A/O x4. RIVERO. +3-4 edema in BLE/BUE. no infusions. VSS. Tele SR. afebrile.      Problem: Gas Exchange Impaired  Goal: Optimal Gas Exchange  Outcome: Not Progressing  Intervention: Optimize Oxygenation and Ventilation  Recent Flowsheet Documentation  Taken 1/29/2024 1800 by Celestina Meeks RN  Head of Bed (HOB) Positioning: HOB at 30-45 degrees  Taken 1/29/2024 1600 by Celestina Meeks RN  Head of Bed (HOB) Positioning: HOB at 30-45 degrees     Problem: Fall Injury Risk  Goal: Absence of Fall and Fall-Related Injury  Outcome: Progressing  Intervention: Identify and Manage Contributors  Recent Flowsheet Documentation  Taken 1/29/2024 1800 by Celestina Meeks RN  Medication Review/Management: medications reviewed  Taken 1/29/2024 1600 by Celestina Meeks RN  Medication Review/Management: medications reviewed  Intervention: Promote Injury-Free Environment  Recent Flowsheet Documentation  Taken 1/29/2024 1800 by Celestina Meeks RN  Safety Promotion/Fall Prevention:   nonskid shoes/slippers when out of bed   lighting adjusted   clutter free environment maintained   safety round/check completed  Taken 1/29/2024 1600 by Celestina Meeks RN  Safety Promotion/Fall Prevention:   nonskid shoes/slippers when out of bed   lighting adjusted   clutter free environment maintained   safety round/check completed

## 2024-01-30 NOTE — PROGRESS NOTES
Patient was transferred from [facility] [place] around 1600. Currently on BiPAP 20/8 60%. Medium face mask, skin is intact under mask, mepilex in place on bridge of nose. RT will continue to adjust settings based on PCO2 values and continue monitoring.     Carmen Plummer, RT  1/29/2024

## 2024-01-30 NOTE — PROGRESS NOTES
Patient was transferred to the ICU from the [facility] today for arterial line placement and attempt to certify that she was eligible for insurance purposes to receive home BiPAP or CPAP.  Before she was transferred she developed hypercapnic respiratory failure, and was sent up on BiPAP and a somnolent state.  Over the ensuing hour her pCO2 went from 90 mmHg to 60 mmHg.  She became responsive again and is now stable on current BiPAP settings.  We will observe overnight and determine if she can return to the heart center in the morning.  No interventions were made thus far by the critical care team.    Andrea Junior MD  [facility] Intensivist Service

## 2024-01-30 NOTE — PROGRESS NOTES
Patient transported to IR for PICC, on BiPAP ST 20/8 x 20RR 45%. Vital signs remained stable, b no issues to report. Patient is back in room 365 on same settings.     Carmen Plummer, RT  1/30/2024

## 2024-01-31 ENCOUNTER — APPOINTMENT (OUTPATIENT)
Dept: GENERAL RADIOLOGY | Facility: CLINIC | Age: 44
End: 2024-01-31
Attending: NURSE PRACTITIONER
Payer: COMMERCIAL

## 2024-01-31 ENCOUNTER — APPOINTMENT (OUTPATIENT)
Dept: PHYSICAL THERAPY | Facility: CLINIC | Age: 44
End: 2024-01-31
Payer: COMMERCIAL

## 2024-01-31 LAB
ANION GAP SERPL CALCULATED.3IONS-SCNC: 7 MMOL/L (ref 7–15)
ANION GAP SERPL CALCULATED.3IONS-SCNC: 8 MMOL/L (ref 7–15)
BASE EXCESS BLDV CALC-SCNC: 7 MMOL/L (ref -3–3)
BASE EXCESS BLDV CALC-SCNC: 7.8 MMOL/L (ref -3–3)
BUN SERPL-MCNC: 19 MG/DL (ref 6–20)
BUN SERPL-MCNC: 19.8 MG/DL (ref 6–20)
CALCIUM SERPL-MCNC: 8.7 MG/DL (ref 8.6–10)
CALCIUM SERPL-MCNC: 9.1 MG/DL (ref 8.6–10)
CHLORIDE SERPL-SCNC: 102 MMOL/L (ref 98–107)
CHLORIDE SERPL-SCNC: 98 MMOL/L (ref 98–107)
CREAT SERPL-MCNC: 1.07 MG/DL (ref 0.51–0.95)
CREAT SERPL-MCNC: 1.11 MG/DL (ref 0.51–0.95)
DEPRECATED HCO3 PLAS-SCNC: 32 MMOL/L (ref 22–29)
DEPRECATED HCO3 PLAS-SCNC: 34 MMOL/L (ref 22–29)
EGFRCR SERPLBLD CKD-EPI 2021: 62 ML/MIN/1.73M2
EGFRCR SERPLBLD CKD-EPI 2021: 65 ML/MIN/1.73M2
ERYTHROCYTE [DISTWIDTH] IN BLOOD BY AUTOMATED COUNT: 15.9 % (ref 10–15)
ERYTHROCYTE [DISTWIDTH] IN BLOOD BY AUTOMATED COUNT: 16.1 % (ref 10–15)
FOLATE SERPL-MCNC: 11.4 NG/ML (ref 4.6–34.8)
GLUCOSE BLDC GLUCOMTR-MCNC: 80 MG/DL (ref 70–99)
GLUCOSE BLDC GLUCOMTR-MCNC: 82 MG/DL (ref 70–99)
GLUCOSE BLDC GLUCOMTR-MCNC: 85 MG/DL (ref 70–99)
GLUCOSE SERPL-MCNC: 226 MG/DL (ref 70–99)
GLUCOSE SERPL-MCNC: 94 MG/DL (ref 70–99)
HCO3 BLDV-SCNC: 33 MMOL/L (ref 21–28)
HCO3 BLDV-SCNC: 34 MMOL/L (ref 21–28)
HCT VFR BLD AUTO: 27.8 % (ref 35–47)
HCT VFR BLD AUTO: 28.8 % (ref 35–47)
HGB BLD-MCNC: 8.5 G/DL (ref 11.7–15.7)
HGB BLD-MCNC: 9.1 G/DL (ref 11.7–15.7)
MCH RBC QN AUTO: 31 PG (ref 26.5–33)
MCH RBC QN AUTO: 32 PG (ref 26.5–33)
MCHC RBC AUTO-ENTMCNC: 30.6 G/DL (ref 31.5–36.5)
MCHC RBC AUTO-ENTMCNC: 31.6 G/DL (ref 31.5–36.5)
MCV RBC AUTO: 101 FL (ref 78–100)
MCV RBC AUTO: 102 FL (ref 78–100)
O2/TOTAL GAS SETTING VFR VENT: 35 %
O2/TOTAL GAS SETTING VFR VENT: 45 %
OXYHGB MFR BLDV: 61 % (ref 70–75)
OXYHGB MFR BLDV: 77 % (ref 70–75)
PCO2 BLDV: 56 MM HG (ref 40–50)
PCO2 BLDV: 58 MM HG (ref 40–50)
PH BLDV: 7.38 [PH] (ref 7.32–7.43)
PH BLDV: 7.38 [PH] (ref 7.32–7.43)
PLATELET # BLD AUTO: 145 10E3/UL (ref 150–450)
PLATELET # BLD AUTO: 160 10E3/UL (ref 150–450)
PO2 BLDV: 34 MM HG (ref 25–47)
PO2 BLDV: 43 MM HG (ref 25–47)
POTASSIUM SERPL-SCNC: 3.4 MMOL/L (ref 3.4–5.3)
POTASSIUM SERPL-SCNC: 3.8 MMOL/L (ref 3.4–5.3)
RBC # BLD AUTO: 2.74 10E6/UL (ref 3.8–5.2)
RBC # BLD AUTO: 2.84 10E6/UL (ref 3.8–5.2)
SAO2 % BLDV: 62.4 % (ref 70–75)
SAO2 % BLDV: 78.4 % (ref 70–75)
SODIUM SERPL-SCNC: 140 MMOL/L (ref 135–145)
SODIUM SERPL-SCNC: 141 MMOL/L (ref 135–145)
WBC # BLD AUTO: 4.6 10E3/UL (ref 4–11)
WBC # BLD AUTO: 5 10E3/UL (ref 4–11)

## 2024-01-31 PROCEDURE — 82805 BLOOD GASES W/O2 SATURATION: CPT | Performed by: INTERNAL MEDICINE

## 2024-01-31 PROCEDURE — 999N000157 HC STATISTIC RCP TIME EA 10 MIN

## 2024-01-31 PROCEDURE — 94660 CPAP INITIATION&MGMT: CPT

## 2024-01-31 PROCEDURE — 250N000011 HC RX IP 250 OP 636: Performed by: HOSPITALIST

## 2024-01-31 PROCEDURE — 82746 ASSAY OF FOLIC ACID SERUM: CPT | Performed by: HOSPITALIST

## 2024-01-31 PROCEDURE — 99232 SBSQ HOSP IP/OBS MODERATE 35: CPT | Performed by: HOSPITALIST

## 2024-01-31 PROCEDURE — 85014 HEMATOCRIT: CPT | Performed by: HOSPITALIST

## 2024-01-31 PROCEDURE — 250N000011 HC RX IP 250 OP 636: Performed by: INTERNAL MEDICINE

## 2024-01-31 PROCEDURE — 999N000065 XR CHEST PORT 1 VIEW

## 2024-01-31 PROCEDURE — 250N000013 HC RX MED GY IP 250 OP 250 PS 637: Performed by: HOSPITALIST

## 2024-01-31 PROCEDURE — 210N000002 HC R&B HEART CARE

## 2024-01-31 PROCEDURE — 97530 THERAPEUTIC ACTIVITIES: CPT | Mod: GP

## 2024-01-31 PROCEDURE — 99233 SBSQ HOSP IP/OBS HIGH 50: CPT | Mod: FS | Performed by: NURSE PRACTITIONER

## 2024-01-31 PROCEDURE — 99233 SBSQ HOSP IP/OBS HIGH 50: CPT | Performed by: INTERNAL MEDICINE

## 2024-01-31 PROCEDURE — 82805 BLOOD GASES W/O2 SATURATION: CPT | Performed by: HOSPITALIST

## 2024-01-31 PROCEDURE — 85027 COMPLETE CBC AUTOMATED: CPT | Performed by: INTERNAL MEDICINE

## 2024-01-31 PROCEDURE — 80048 BASIC METABOLIC PNL TOTAL CA: CPT | Performed by: INTERNAL MEDICINE

## 2024-01-31 PROCEDURE — 80048 BASIC METABOLIC PNL TOTAL CA: CPT | Performed by: HOSPITALIST

## 2024-01-31 PROCEDURE — 250N000013 HC RX MED GY IP 250 OP 250 PS 637: Performed by: NURSE PRACTITIONER

## 2024-01-31 PROCEDURE — 250N000013 HC RX MED GY IP 250 OP 250 PS 637: Performed by: INTERNAL MEDICINE

## 2024-01-31 RX ORDER — ACETAMINOPHEN 325 MG/1
975 TABLET ORAL ONCE
Status: COMPLETED | OUTPATIENT
Start: 2024-01-31 | End: 2024-01-31

## 2024-01-31 RX ORDER — NITROGLYCERIN 0.4 MG/1
0.4 TABLET SUBLINGUAL EVERY 5 MIN PRN
Status: DISCONTINUED | OUTPATIENT
Start: 2024-01-31 | End: 2024-02-06 | Stop reason: HOSPADM

## 2024-01-31 RX ORDER — LIDOCAINE 40 MG/G
CREAM TOPICAL
Status: DISCONTINUED | OUTPATIENT
Start: 2024-01-31 | End: 2024-02-02

## 2024-01-31 RX ADMIN — ENOXAPARIN SODIUM 40 MG: 40 INJECTION SUBCUTANEOUS at 15:54

## 2024-01-31 RX ADMIN — MICONAZOLE NITRATE: 2 POWDER TOPICAL at 09:14

## 2024-01-31 RX ADMIN — MICONAZOLE NITRATE: 2 POWDER TOPICAL at 20:15

## 2024-01-31 RX ADMIN — ONDANSETRON 4 MG: 2 INJECTION INTRAMUSCULAR; INTRAVENOUS at 05:58

## 2024-01-31 RX ADMIN — ENOXAPARIN SODIUM 40 MG: 40 INJECTION SUBCUTANEOUS at 03:40

## 2024-01-31 RX ADMIN — FOLIC ACID 1 MG: 1 TABLET ORAL at 09:14

## 2024-01-31 RX ADMIN — KETOROLAC TROMETHAMINE 15 MG: 15 INJECTION, SOLUTION INTRAMUSCULAR; INTRAVENOUS at 03:56

## 2024-01-31 RX ADMIN — LEVOTHYROXINE SODIUM 250 MCG: 100 TABLET ORAL at 09:13

## 2024-01-31 RX ADMIN — ACETAMINOPHEN 975 MG: 325 TABLET, FILM COATED ORAL at 06:18

## 2024-01-31 ASSESSMENT — ABNORMAL INVOLUNTARY MOVEMENT SCALE (AIMS)
LOWER_BODY_EXTREMITIES: NONE, NORMAL
CURRENT_PROBLEMS_TEETH_DENTURES: NO
UPPER_BODY_EXTREMITIES: NONE, NORMAL

## 2024-01-31 ASSESSMENT — ACTIVITIES OF DAILY LIVING (ADL)
ADLS_ACUITY_SCORE: 34
ADLS_ACUITY_SCORE: 27
ADLS_ACUITY_SCORE: 34

## 2024-01-31 NOTE — PLAN OF CARE
Goal Outcome Evaluation:      Plan of Care Reviewed With: patient, friend    Overall Patient Progress: improvingOverall Patient Progress: improving    Outcome Evaluation: Pt AOx4. SpO2 saturations between 88 and 96% per pulmonologist on continuous BiPap. Bipap settings frequently changed throughout the day, see flowsheets. FiO2 45-60%. Tolerates BiPap with occassional oral swabs and cares. LS clear. Gonzales in place with AUO, given diuretics this evening. No BM, audible but hypoactive BS, passing flatus. R triple lumen PICC placed, redressed by vascular team. L midline removed. Pt up in chair with PT today, stood and pivoted with GBW. Skin grossly intact, new RUE and RLE rash onset approx 1300.   Plan: CTM respiratory status and encourage ambulation; trend VBGs. Discharge with BiPap machine and cardiact monitor.      Problem: Adult Inpatient Plan of Care  Goal: Plan of Care Review  Description: The Plan of Care Review/Shift note should be completed every shift.  The Outcome Evaluation is a brief statement about your assessment that the patient is improving, declining, or no change.  This information will be displayed automatically on your shift  note.  Outcome: Progressing  Flowsheets (Taken 1/30/2024 2008)  Outcome Evaluation:   Pt AOx4. SpO2 saturations between 88 and 96% per pulmonologist on continuous BiPap. Bipap settings frequently changed throughout the day, see flowsheets. FiO2 45-60%. Tolerates BiPap with occassional oral swabs and cares. LS clear. Gonzales in place with AUO, given diuretics this evening. No BM. R triple lumen PICC placed, redressed by vascular team. L midline removed. Pt up in chair with PT today, stood and pivoted with GBW. Skin grossly intact, new RUE and RLE rash onset approx 1300. CTM respiratory status and encourage ambulation   trend VBGs. Plan to discharge with BiPap machine and cardiact monitor.  Plan of Care Reviewed With:   patient   friend  Overall Patient Progress: improving  Goal:  "Patient-Specific Goal (Individualized)  Description: You can add care plan individualizations to a care plan. Examples of Individualization might be:  \"Parent requests to be called daily at 9am for status\", \"I have a hard time hearing out of my right ear\", or \"Do not touch me to wake me up as it startles  me\".  Outcome: Progressing  Goal: Absence of Hospital-Acquired Illness or Injury  Outcome: Progressing  Intervention: Identify and Manage Fall Risk  Recent Flowsheet Documentation  Taken 1/30/2024 1700 by Tierra Rowe RN  Safety Promotion/Fall Prevention:   nonskid shoes/slippers when out of bed   lighting adjusted   clutter free environment maintained   safety round/check completed  Taken 1/30/2024 1200 by Tierra Rowe RN  Safety Promotion/Fall Prevention:   nonskid shoes/slippers when out of bed   lighting adjusted   clutter free environment maintained   safety round/check completed  Taken 1/30/2024 0800 by Tierra Rowe RN  Safety Promotion/Fall Prevention:   nonskid shoes/slippers when out of bed   lighting adjusted   clutter free environment maintained   safety round/check completed  Intervention: Prevent Skin Injury  Recent Flowsheet Documentation  Taken 1/30/2024 1700 by Tierra Rowe RN  Body Position:   turned   right  Skin Protection:   incontinence pads utilized   silicone foam dressing in place   pulse oximeter probe site changed  Device Skin Pressure Protection:   tubing/devices free from skin contact   absorbent pad utilized/changed  Taken 1/30/2024 1500 by Tierra Rowe RN  Body Position: (sitting, edge of bed/upright in chair) other (see comments)  Taken 1/30/2024 1400 by Tierra Rowe RN  Body Position:   turned   left  Taken 1/30/2024 1200 by Tierra Rowe RN  Body Position:   turned   right  Skin Protection:   incontinence pads utilized   silicone foam dressing in place   pulse oximeter probe site changed  Device Skin Pressure Protection:   tubing/devices free from skin " contact   absorbent pad utilized/changed  Taken 1/30/2024 1000 by Tierra Rowe RN  Body Position:   turned   left  Taken 1/30/2024 0800 by Tierra Rowe RN  Body Position:   position maintained   weight shifting  Skin Protection:   incontinence pads utilized   silicone foam dressing in place   pulse oximeter probe site changed  Device Skin Pressure Protection:   tubing/devices free from skin contact   absorbent pad utilized/changed  Intervention: Prevent and Manage VTE (Venous Thromboembolism) Risk  Recent Flowsheet Documentation  Taken 1/30/2024 1700 by Tierra Rowe RN  VTE Prevention/Management: SCDs (sequential compression devices) on  Taken 1/30/2024 1200 by Tierra Rowe RN  VTE Prevention/Management: SCDs (sequential compression devices) on  Taken 1/30/2024 0800 by Tierra Rowe RN  VTE Prevention/Management: SCDs (sequential compression devices) on  Intervention: Prevent Infection  Recent Flowsheet Documentation  Taken 1/30/2024 1700 by Tierra Rowe RN  Infection Prevention: cohorting utilized  Taken 1/30/2024 1200 by Tierra Rowe RN  Infection Prevention: cohorting utilized  Taken 1/30/2024 0800 by Tierra Rowe RN  Infection Prevention: cohorting utilized  Goal: Optimal Comfort and Wellbeing  Outcome: Progressing  Intervention: Monitor Pain and Promote Comfort  Recent Flowsheet Documentation  Taken 1/30/2024 1320 by Tierra Rowe RN  Pain Management Interventions:   medication (see MAR)   distraction  Taken 1/30/2024 0800 by Tierra Rowe RN  Pain Management Interventions:   care clustered   environmental changes  Intervention: Provide Person-Centered Care  Recent Flowsheet Documentation  Taken 1/30/2024 1700 by Tierra Rowe RN  Trust Relationship/Rapport:   care explained   choices provided   questions answered   questions encouraged   thoughts/feelings acknowledged  Taken 1/30/2024 1200 by Tierra Rowe RN  Trust Relationship/Rapport:   care explained   choices  provided   questions answered   questions encouraged   thoughts/feelings acknowledged  Taken 1/30/2024 0800 by Tierra Rowe RN  Trust Relationship/Rapport:   care explained   choices provided   questions answered   questions encouraged   thoughts/feelings acknowledged  Goal: Readiness for Transition of Care  Outcome: Progressing     Problem: Gas Exchange Impaired  Goal: Optimal Gas Exchange  Outcome: Progressing  Intervention: Optimize Oxygenation and Ventilation  Recent Flowsheet Documentation  Taken 1/30/2024 1700 by Tierra Rowe RN  Airway/Ventilation Management: airway patency maintained  Head of Bed (HOB) Positioning: HOB at 30-45 degrees  Taken 1/30/2024 1500 by Tierra Rowe RN  Head of Bed (HOB) Positioning: HOB at 60-90 degrees  Taken 1/30/2024 1400 by Tierra Rowe RN  Head of Bed (HOB) Positioning: HOB at 30-45 degrees  Taken 1/30/2024 1200 by Tierra Rowe RN  Airway/Ventilation Management: airway patency maintained  Head of Bed (HOB) Positioning: HOB at 30-45 degrees  Taken 1/30/2024 1000 by Tierra Rowe RN  Head of Bed (HOB) Positioning: HOB at 30-45 degrees  Taken 1/30/2024 0800 by Teirra Rowe RN  Airway/Ventilation Management: airway patency maintained  Head of Bed (HOB) Positioning: HOB at 30-45 degrees     Problem: Fall Injury Risk  Goal: Absence of Fall and Fall-Related Injury  Outcome: Progressing  Intervention: Identify and Manage Contributors  Recent Flowsheet Documentation  Taken 1/30/2024 1700 by Tierra Rowe RN  Medication Review/Management: medications reviewed  Taken 1/30/2024 1200 by Tirera Rowe RN  Medication Review/Management: medications reviewed  Taken 1/30/2024 0800 by Tierra Rowe RN  Medication Review/Management: medications reviewed  Intervention: Promote Injury-Free Environment  Recent Flowsheet Documentation  Taken 1/30/2024 1700 by Tierra Rowe RN  Safety Promotion/Fall Prevention:   nonskid shoes/slippers when out of bed   lighting  adjusted   clutter free environment maintained   safety round/check completed  Taken 1/30/2024 1200 by Tierra Rowe, RN  Safety Promotion/Fall Prevention:   nonskid shoes/slippers when out of bed   lighting adjusted   clutter free environment maintained   safety round/check completed  Taken 1/30/2024 0800 by Tierra Rowe, RN  Safety Promotion/Fall Prevention:   nonskid shoes/slippers when out of bed   lighting adjusted   clutter free environment maintained   safety round/check completed

## 2024-01-31 NOTE — PROVIDER NOTIFICATION
"At 0600, pt became nauseated with a 10/10 headache and felt \"very warm.\" Discussed with MD IV toradol was ineffective. MD ordered 975 mg PO tylenol. Bi pap removed and placed on NC 5 lpm. Zofran given, cool washcloths and tylenol. Will reassess pain as per policy.   "

## 2024-01-31 NOTE — PLAN OF CARE
Problem: Adult Inpatient Plan of Care  Goal: Plan of Care Review  Description: The Plan of Care Review/Shift note should be completed every shift.  The Outcome Evaluation is a brief statement about your assessment that the patient is improving, declining, or no change.  This information will be displayed automatically on your shift  note.  Outcome: Progressing  Flowsheets (Taken 1/31/2024 0512)  Outcome Evaluation: a/ox4, neuro intact, tolerating bipap-oral cares and mouth swabs as needed. placed on 5 lpm nc for 5 minutes while patient brushed teeth at hs.  Plan of Care Reviewed With: patient  Overall Patient Progress: improving     Problem: Gas Exchange Impaired  Goal: Optimal Gas Exchange  Outcome: Progressing  Intervention: Optimize Oxygenation and Ventilation  Recent Flowsheet Documentation  Taken 1/31/2024 0000 by Livier Leonard RN  Head of Bed (HOB) Positioning: HOB at 30-45 degrees  Taken 1/30/2024 2200 by Livier Leonard RN  Head of Bed (HOB) Positioning: HOB at 30-45 degrees  Taken 1/30/2024 2000 by Livier Leonard RN  Head of Bed (HOB) Positioning: HOB at 30-45 degrees  Taken 1/30/2024 1900 by Livier Leonard RN  Head of Bed (HOB) Positioning: HOB at 30-45 degrees     Problem: Fall Injury Risk  Goal: Absence of Fall and Fall-Related Injury  Outcome: Progressing  Intervention: Identify and Manage Contributors  Recent Flowsheet Documentation  Taken 1/31/2024 0000 by Livier Leonard RN  Medication Review/Management: medications reviewed  Taken 1/30/2024 2000 by Livier Leonard RN  Medication Review/Management: medications reviewed  Intervention: Promote Injury-Free Environment  Recent Flowsheet Documentation  Taken 1/31/2024 0000 by Livier Leonard RN  Safety Promotion/Fall Prevention:   activity supervised   room near nurse's station   safety round/check completed   supervised activity  Taken 1/30/2024 2000 by Livier Leonard RN  Safety Promotion/Fall  Prevention:   activity supervised   room near nurse's station   safety round/check completed   supervised activity   Goal Outcome Evaluation:      Plan of Care Reviewed With: patient    Overall Patient Progress: improvingOverall Patient Progress: improving    Outcome Evaluation: a/ox4, neuro intact, tolerating bipap-oral cares and mouth swabs as needed. placed on 5 lpm nc for 5 minutes while patient brushed teeth at hs.

## 2024-01-31 NOTE — PROGRESS NOTES
[facility]    Pulmonary, Allergy, Critical Care and Sleep Medicine    Pulmonary Consult Progress Note    Yodit Harvey MRN# [id number]   Age: 43 year old YOB: 1980     Date of Admission: 1/27/2024  Date of Service: 01/31/2024     ==================================================  INTERVAL EVENTS:    Feels improved, with improvement in blood gases.  Persistent hypercapnia but not acidotic.  Patient is alert awake and denies any respiratory distress.       CHANGES FOR TODAY:    -Agree with breaks from BiPAP and monitoring of mental status.  Anticipate need for BiPAP as needed and she definitely requires BiPAP overnight.  She would also require BiPAP to be set up at home and this will need to be arranged prior to discharge.       ==================================================    ASSESSMENT AND RECOMMENDATIONS:    Probable obesity hypoventilation syndrome with suspected obstructive sleep apnea  Acute on chronic hypoxic respiratory failure with hypercapnia  Morbid obesity       Initially presented for dizziness and falls, found to be in SVT, but also hypoxic and hypercarbic.  Overnight oximetry with 42 minutes below 88% despite supplemental oxygen.  Venous gases 1/29 with pH between 7.17 and 7.21 with minimal improvement on BiPAP so she was transferred to the ICU for closer monitoring.    Once she has recovered from this acute episode, she will need overnight assessment of CO2 accumulation by ABG.  Unfortunately due to her habitus we have not been able to obtain an ABG so she will need to have an arterial line placed have an ABG collected when she is awake, and another early in the morning after she has been sleeping without BiPAP to assess whether or not her CO2 accumulates overnight.  This will be necessary to qualify her for home BiPAP     Continue BiPAP but okay with breaks from BiPAP and monitoring of mental status in between.  Anticipate need for BiPAP as needed and  "she definitely requires BiPAP overnight.  She would also require BiPAP to be set up at home and this will need to be arranged prior to discharge.  Target oxygen saturations between 88 and 92%  She will need full sleep medicine assessment following discharge     Agree with transfer out of ICU to intermediate care.  She will need to be placed in unit where she can still get BiPAP during daytime and BiPAP at night.  She continues to require close monitoring for risk for deterioration of respiratory status and impending respiratory failure.  This was relayed to the ICU RN.     Ethan Holliday MD [facility]  Associate Professor of Medicine  Division of Pulmonary, Allergy & Critical Care   [facility]  [facility]      ==================================================      PHYSICAL EXAM  BP 96/53   Pulse 65   Temp 99  F (37.2  C)   Resp 22   Ht 1.626 m (5' 4\")   Wt (!) 224 kg (493 lb 13.3 oz)   SpO2 94%   BMI 84.77 kg/m        Intake/Output Summary (Last 24 hours) at 1/30/2024 0931  Last data filed at 1/30/2024 0600  Gross per 24 hour   Intake 567.5 ml   Output 810 ml   Net -242.5 ml       Vitals:    01/27/24 0458 01/28/24 0629   Weight: (!) 224 kg (493 lb 13.3 oz) (!) 224 kg (493 lb 13.3 oz)         General: Alert, interactive, NAD  Resp: CTAB, no crackles or wheezes  Cardiac: Tachy, regular, NS1,S2, No m/r/g  Extremities: No LE edema or obvious joint abnormalities  Skin: Warm and dry, no jaundice or rash  Neuro: Alert & oriented x 3, Cns 2-12 intact, moves all extremities equally      Recent Labs   Lab Test 01/31/24  0346 01/30/24  0718 01/29/24  0653   WBC 5.0 6.5 8.1   RBC 2.84* 2.91* 3.21*   HGB 9.1* 9.2* 10.2*    153 171       Recent Labs   Lab Test 01/31/24  0346 01/31/24  0345 01/31/24  0013 01/30/24  0910 01/30/24  0718 01/29/24  1639 01/29/24  0653 01/28/24  0542 01/27/24  0450     --   --   --  139  --  137   < > 139   POTASSIUM 3.8  --   --   --  " "4.5  --  4.5   < > 4.2   CHLORIDE 98  --   --   --  100  --  97*   < > 98   CO2 34*  --   --   --  32*  --  33*   < > 27   BUN 19.0  --   --   --  17.6  --  16.3   < > 16.1   CR 1.11*  --   --   --  0.94  --  0.91   < > 1.47*   GLC 94 80 85   < > 93   < > 129*   < > 158*   BEKCY 9.1  --   --   --  8.9  --  9.2   < > 9.3   MAG  --   --   --   --   --   --   --   --  2.0    < > = values in this interval not displayed.           No results for input(s): \"CULT\" in the last 168 hours.      Recent Results (from the past 48 hour(s))   IR PICC Placement > 5 Yrs of Age    Narrative    IR PICC PLACEMENT > 5 YEARS OF AGE 1/30/2024 5:10 PM    HISTORY: 43-year-old woman with need for long-term IV access. Attempts  were made for access by IV team, though unsuccessful. Request made for  placement in IR. Patient is greater than 500 pounds, difficult to  position and document location.    TECHNIQUE: Patient was brought to interventional radiology department  after informed consent obtained. Patient was placed in a supine  position. Maximal Sterile Barrier Technique Utilized: Cap AND mask AND  sterile gown AND sterile gloves AND sterile full body drape AND hand  hygiene AND skin preparation 2% chlorhexidine for cutaneous antisepsis  (or acceptable alternative antiseptics). Sterile Ultrasound Technique  Utilized ?Sterile gel AND sterile probe covers. 1% lidocaine was used  for local anesthetic to identify the basilic vein in the upper arm,  patency confirmed, and ultrasound image stored for documentation.  Micropuncture kit was used to access the basilic vein. A peel-away  sheath was placed and a PICC was advanced until the right atrial/SVC  junction, somewhat difficult to document as patient was partially on  table, though unable to fully migrate to the table given body habitus.  The triple-lumen PICC is 50 cm in length with 2 cm external to the  skin surface. The 3 lumens aspirated normally and flushed with normal  saline. Of note, " the PICC micropuncture access kit was used to access  the basilic vein, without difficulty.    Sedation: None  Fluoroscopic time: 1.6 minutes  Air Kerma: 57.5 mGy  Local anesthetic: 8 mL of 1% lidocaine. Four spot fluoroscopic images  obtained demonstrate placement of PICC tip at the RA/SVC junction.      Impression    IMPRESSION: Successful placement of triple lumen PICC in the right  upper arm. PICC is ready for immediate use.    GERMAN KULKARNI MD         SYSTEM ID:  [id number]     I spent 50 minutes dedicated to this care so far today excluding procedures, including review of medical records, review of imaging (results & images), time with patient and time in documentation.

## 2024-01-31 NOTE — PROGRESS NOTES
PICC line pulled back to the 20cm ky to end up being a midline as recent chest xray showed tip of PICC line being in the brachiocephalic vein.    Nurse informed.

## 2024-01-31 NOTE — PROGRESS NOTES
[facility]  Cardiology Progress Note  Date of Service: 01/31/2024      Assessment & Plan    Yodit Harvey is a 43 year old female admitted on 1/27/2024 with dizziness/falls, found to be in SVT.     Assessment:  1. Acute on chronic hypoxic respiratory failure with hypercapnia   2. Obesity hypoventilation syndrome   3. Obstructive sleep apnea   4. Hypothyroidism - uncontrolled, .9, Free T4 0.18  5. Morbid obesity  6. Anemia   7. SVT on admission, likely secondary to uncontrolled hypothyroidism    Cardiology saw earlier during hospitalization, we were re-consulted today for elevated NTproBNP which was 1,199 1/27 and 1,271 on 1/29. Echocardiogram on 1/28 showed LVEF 55-60%, RV mildly dilated with mild decrease in RV systolic function,  no significant valvular disease and IVC not dilated. She has had no weights for the past 3 days but had been ~ 493# on admission. Her acute on chronic respiratory failure is likely secondary to obesity hypoventilation syndrome and JL. This is not related to heart failure, her NTproBNP could be elevated for a multitude of reasons including her obesity hypoventilation syndrome. She did receive a diuretic challenge yesterday with no improvement in symptoms and bump in creatinine and there is no pulmonary edema on CXR. At this time we agree with recommendations for outpatient sleep medicine evaluation, weight management clinic referral, treatment of hypothyroidism and PRN diuretics.     Plan:   Diuretics as needed  Agree with recommendations for outpatient sleep medicine evaluation and weight management clinic referral  Cardiology signing off, please contact with any additional questions/concerns.     NEETU [facility]  Pager: [phone]      I spent  20 minutes face-to-face and/or coordinating care of Yodit Harvey. Over 50% of our time on the unit was spent counseling the patient and/or coordinating care regarding his or her  cardiac condition.    Interval History   On BiPAP at time of exam which limits ability to provide history, denies chest pain, denies shortness of breath.     Physical Exam   Temp: 98.6  F (37  C) Temp src: Bladder BP: 102/55 Pulse: 52   Resp: 20 SpO2: 95 % O2 Device: BiPAP/CPAP    Vitals:    01/27/24 0458 01/28/24 0629   Weight: (!) 224 kg (493 lb 13.3 oz) (!) 224 kg (493 lb 13.3 oz)     GEN: well nourished, in no acute distress.  HEENT:  Pupils equal, round. Sclerae nonicteric.   NECK: Supple, no masses appreciated. JVP hard to assess due to body habitus.   C/V:  Distant heart sounds, regular rate and rhythm  RESP: Respirations are unlabored on bipap. Clear to auscultation bilaterally without wheezing, rales, or rhonchi.  GI: Abdomen soft, obese, nontender.  EXTREM: chronic LE lymphedema.  NEURO: Alert and oriented, cooperative.  SKIN: Warm and dry.     Medications    dextrose 5% and 0.9% NaCl 10 mL/hr at 01/31/24 1459    - MEDICATION INSTRUCTIONS -        enoxaparin ANTICOAGULANT  40 mg Subcutaneous Q12H    folic acid  1 mg Oral Daily    levothyroxine  250 mcg Oral QAM AC    miconazole   Topical BID    sodium chloride (PF)  10 mL Intracatheter Q8H    sodium chloride (PF)  3 mL Intracatheter Q8H       Data   Last 24 hours labs reviewed     EKG : sinus rhythm    Echo: 1/28/24  Left ventricular systolic function is normal.  The visual ejection fraction is 55-60%.  The right ventricle is mildly dilated.  Mildly decreased right ventricular systolic function  The study was technically difficult. There is no comparison study available.

## 2024-01-31 NOTE — PROGRESS NOTES
[facility]    Medicine Progress Note - Hospitalist Service    Date of Admission:  1/27/2024    Assessment & Plan     Yodit Harvey is a 43 year old female with past medical history significant for hypothyroidism and morbid obesity admitted on 1/27/2024 with dizziness and falls, found to be in SVT. She is also found to have mild hypoxia and compensated hypercapnia.       Respiratory failure with hypoxemia and hypercapnia likely due to OHS and JL  Respiratory acidosis  Hypercapnia- suspect chronic, likely OHS and JL   History of recent COVID-19 infection in December 2023  Encephalopathy likely due to hypercapnia-improved  -On admission patient endorsed some shortness of breath following conversion to NSR. O2 sats in the ED down to mid 80s at times. VBG showed elevated pCO2 to 62 but with a normal pH, so this is presumed chronic.   -CXR was unremarkable but limited due to body habitus.   - CTPE negative for pulmonary embolism. Mild dependent probable atelectasis vs less likely infiltrate.   -It was thought that her mild hypoxia and hypercapnia is chronic likely due to obesity hypoventilation syndrome, likely due to undiagnosed sleep apnea  -Respiratory panel was checked on admission and was negative  --Echo on admission showed EF of 55 to 60%, LV diastolic function is indeterminate and mild RV volume overload  -She did had mildly elevated proBNP on admission and I did order repeat as per request of her  and it is near same as before and cardiology did not think that patient needed any diuresis  -On exam she does seem to have bilateral lymphedema and lymphedema therapy has been ordered  -VBG's have been done on the patient and on 1/29/2024 she was found to have respiratory acidosis with hypercapnia with initiation of BiPAP and pulmonary team was consulted and patient was moved to ICU as she had severe decompensation in form of encephalopathy and she was continued with  BiPAP  -1/30/2024-IR guided access was placed on the patient and BiPAP settings were adjusted and on p.m. of 1/30 patient received 40 mg IV Lasix with Diamox  -1/31/24-patient has been on BiPAP most of the times and venous blood gas was evaluated today with improvement in pH but she still has hypercapnia and was evaluated by the pulmonary team and they recommended breaks from the BiPAP and monitoring of mental status and will definitely need BiPAP overnight  -We will continue to check VBG every 12 hours and give her breaks from BiPAP and monitor respiratory status with plan to use BiPAP during nighttime  -I have paged the pulmonary team but awaiting response regarding ongoing plan of care as she cannot be on the BiPAP all the time as she needs to eat  -I also reached out to the cardiology team and as per them her acute on chronic respiratory failure is likely due to obesity hypoventilation syndrome and JL and proBNP can be elevated due to multiple reason including obesity hypoventilation syndrome and is not related to heart failure and they recommend to do diuretics on as-needed basis      SVT   Dizziness, falls likely 2/2 above   Pt presented to the ED after two falls. The first fall she reports was a trip and fall, however when getting up she started feeling dizzy so sat down. When she got up she again started to feel dizzy and was assisted to the ground by her . She thinks though that she might have passed out briefly during the second fall. When EMS arrived her HR was 177.   - EKG in the ED showed SVT. She was given adenosine x2 and she eventually returned to a normal sinus rhythm.   -Patient evaluated by the cardiology team and initial differentials included heart failure with preserved ejection fraction versus high-output heart failure and they were not concerned about ischemia and they recommended to treat her underlying thyroid issues  -Patient was being monitored in the hospital and troponins peaked  at 170 and they have trended down   -Echo on admission showed EF of 55 to 60%, LV diastolic function is indeterminate and mild RV volume overload  -Heart rate seems to be stable for now    Troponin elevation likely due to demand  -On admission presented without any chest pain and troponins peaked at around 170s and trended down   - TTE and Cardiology consult as noted above       acute kidney injury-resolved  -Creatinine on admission is 1.47. No prior creatinine on file.   -Renal function has been fluctuating and is on the higher side and is 1.07  -Will monitor daily and diuretics should be used cautiously     Uncontrolled hypothyroidism   Pt with known hx of hypothyroidism. PTA is on levothyroxine 250mcg daily, but has not been taking for years and prior to that was only intermittently taking  - TSH is 144 and T4 is 0.18   -Continue with PTA levothyroxine at 250 mcg    -`Will recommend to follow-up with endocrinology     Mild chronic macrocytic anemia   -Hemoglobin on admission was 10.6 and in EMR last recorded value is in 2016 when it was 11.6  -B12 and iron studies are adequate  - Folate levels are pending  -Hemoglobin today is 8.5  -Will check for folate levels tomorrow morning and this can be due to underlying hypothyroidism  -I do not see any bleeding but I have noticed that she had a lot of oozing from the midline yesterday and also has been having multiple blood draws and will check FOBT  -I think the acute drop in the hospital can be due to multiple blood draws but continue to monitor and there is no evidence of any active bleeding  - will send for peripheral smear      Hyperglycemia  -  HbA1c during hospitalization is 5.7    Headache  -On exam she does not have any focal deficits and I think her headache is most likely due to CO2 narcosis and the patient was on BiPAP this morning and we can use IV Toradol as needed     Morbid obesity   Body mass index is 82.18 kg/m .  Pt reports she has gained over 200lbs  "since the start of COVID.   - Discussed briefly recommendations for weight loss   - Treating hypothyroidism should help   - Would also recommend referral to endocrinology or comprehensive weight management clinic.              Diet: NPO for Medical/Clinical Reasons Except for: Meds    DVT Prophylaxis: Pneumatic Compression Devices, subcu Lovenox  Gonzales Catheter: PRESENT, indication: Strict 1-2 Hour I&O;Retention  Lines: PRESENT      [REMOVED] PICC 01/30/24 Triple Lumen Right Brachial vein medial-Site Assessment: WDL    Cardiac Monitoring: ACTIVE order. Indication: ICU  Code Status: Full Code      Clinically Significant Risk Factors                         # Severe Obesity: Estimated body mass index is 84.77 kg/m  as calculated from the following:    Height as of this encounter: 1.626 m (5' 4\").    Weight as of this encounter: 224 kg (493 lb 13.3 oz).             Disposition Plan             Alma Christopher MD  Hospitalist Service  [facility]  Securely message with [employer] (more info)  Text page via [employer] Paging/Directory   _  She is critically ill and her prognosis is guarded     _____________________________________________________________________    Interval History     Seen this morning and also in the afternoon and patient was participating in therapy and was alert.  She had headache this morning which went away.  She denies any nausea and vomiting.  She is on BiPAP.  Discussed with patient that we will touch base with pulmonary team regarding     D/w Anmol and he said pt mom had cancer but she has not had weight losos, no sweats and no belly    Physical Exam   Vital Signs: Temp: 98.6  F (37  C) Temp src: Bladder BP: 100/48 Pulse: 50   Resp: 20 SpO2: 96 % O2 Device: BiPAP/CPAP    Weight: 493 lbs 13.29 oz        General: Patient appears comfortable and in no acute distress.  HEENT: Head is atraumatic,   Respiratory: Lungs are clear to auscultation bilaterally with no wheeze or crackles "   Cardiovascular: Regular rate , S1 and S2 normal with no murmer or rubs or gallops and has bilateral swelling and her edema seems to be nonpitting  Abdomen:   soft , non tender , non distended and bowel sound present and she is on nasal cannula  Neurologic: No facial droop  Musculoskeletal: Normal Range of motion over upper and lower extremities bilaterally   Psychiatric: cooperative      Medical Decision Making           Time spent in care of patient is 45 minutes and reviewed labs and discussed plan of care in detail with the patient and the nursing staff.  I also called the  later in the day today.    Data     I have personally reviewed the following data over the past 24 hrs:    4.6  \   8.5 (L)   / 145 (L)     141 102 19.8 /  226 (H)   3.4 32 (H) 1.07 (H) \       Imaging results reviewed over the past 24 hrs:   Recent Results (from the past 24 hour(s))   IR PICC Placement > 5 Yrs of Age    Narrative    IR PICC PLACEMENT > 5 YEARS OF AGE 1/30/2024 5:10 PM    HISTORY: 43-year-old woman with need for long-term IV access. Attempts  were made for access by IV team, though unsuccessful. Request made for  placement in IR. Patient is greater than 500 pounds, difficult to  position and document location.    TECHNIQUE: Patient was brought to interventional radiology department  after informed consent obtained. Patient was placed in a supine  position. Maximal Sterile Barrier Technique Utilized: Cap AND mask AND  sterile gown AND sterile gloves AND sterile full body drape AND hand  hygiene AND skin preparation 2% chlorhexidine for cutaneous antisepsis  (or acceptable alternative antiseptics). Sterile Ultrasound Technique  Utilized ?Sterile gel AND sterile probe covers. 1% lidocaine was used  for local anesthetic to identify the basilic vein in the upper arm,  patency confirmed, and ultrasound image stored for documentation.  Micropuncture kit was used to access the basilic vein. A peel-away  sheath was placed and  a PICC was advanced until the right atrial/SVC  junction, somewhat difficult to document as patient was partially on  table, though unable to fully migrate to the table given body habitus.  The triple-lumen PICC is 50 cm in length with 2 cm external to the  skin surface. The 3 lumens aspirated normally and flushed with normal  saline. Of note, the PICC micropuncture access kit was used to access  the basilic vein, without difficulty.    Sedation: None  Fluoroscopic time: 1.6 minutes  Air Kerma: 57.5 mGy  Local anesthetic: 8 mL of 1% lidocaine. Four spot fluoroscopic images  obtained demonstrate placement of PICC tip at the RA/SVC junction.      Impression    IMPRESSION: Successful placement of triple lumen PICC in the right  upper arm. PICC is ready for immediate use.    GERMAN KULKARNI MD         SYSTEM ID:  [id number]   XR Chest Port 1 View    Narrative    CHEST ONE VIEW  1/31/2024 1:55 PM     HISTORY: Verify PICC .    COMPARISON: Chest radiograph 1/27/2024. Fluoroscopic image 1/30/2024.      Impression    IMPRESSION: Patient is rotated.     PICC tip projects in the region of the brachiocephalic  confluence/upper SVC. This could be advanced by approximately 8-9 cm.  This appears retracted from the recent fluoroscopic image given  differences in technique.     Left midlung atelectasis. No focal consolidation, pleural effusion or  pneumothorax. Cardiomediastinal silhouette is unremarkable.    VLADIMIR SHETH MD         SYSTEM ID:  [id number]

## 2024-01-31 NOTE — PROGRESS NOTES
Noted that external catheter length of PICC was 2cm when placed by IR and had charting on flowsheet reading that the external catheter length is 5cm. Went and took measurement of external cath length and it appears to be approximately 6cm. Spoke to bedside RN who will obtain a portable CXR order from MD to ensure that the PICC remains in the SVC. Will continue to monitor.

## 2024-02-01 ENCOUNTER — DOCUMENTATION ONLY (OUTPATIENT)
Dept: HOME HEALTH SERVICES | Facility: CLINIC | Age: 44
End: 2024-02-01
Payer: COMMERCIAL

## 2024-02-01 ENCOUNTER — APPOINTMENT (OUTPATIENT)
Dept: PHYSICAL THERAPY | Facility: CLINIC | Age: 44
End: 2024-02-01
Payer: COMMERCIAL

## 2024-02-01 LAB
ALBUMIN SERPL BCG-MCNC: 3.8 G/DL (ref 3.5–5.2)
ALP SERPL-CCNC: 42 U/L (ref 40–150)
ALT SERPL W P-5'-P-CCNC: 19 U/L (ref 0–50)
ANION GAP SERPL CALCULATED.3IONS-SCNC: 10 MMOL/L (ref 7–15)
AST SERPL W P-5'-P-CCNC: 33 U/L (ref 0–45)
BASE EXCESS BLDV CALC-SCNC: 5.4 MMOL/L (ref -3–3)
BASE EXCESS BLDV CALC-SCNC: 6.1 MMOL/L (ref -3–3)
BASE EXCESS BLDV CALC-SCNC: 6.8 MMOL/L (ref -3–3)
BASOPHILS # BLD AUTO: ABNORMAL 10*3/UL
BASOPHILS # BLD MANUAL: 0.1 10E3/UL (ref 0–0.2)
BASOPHILS NFR BLD AUTO: ABNORMAL %
BASOPHILS NFR BLD MANUAL: 1 %
BILIRUB SERPL-MCNC: 0.4 MG/DL
BUN SERPL-MCNC: 19.5 MG/DL (ref 6–20)
CALCIUM SERPL-MCNC: 9 MG/DL (ref 8.6–10)
CHLORIDE SERPL-SCNC: 100 MMOL/L (ref 98–107)
CREAT SERPL-MCNC: 1.05 MG/DL (ref 0.51–0.95)
DEPRECATED HCO3 PLAS-SCNC: 30 MMOL/L (ref 22–29)
EGFRCR SERPLBLD CKD-EPI 2021: 66 ML/MIN/1.73M2
EOSINOPHIL # BLD AUTO: ABNORMAL 10*3/UL
EOSINOPHIL # BLD MANUAL: 0.4 10E3/UL (ref 0–0.7)
EOSINOPHIL NFR BLD AUTO: ABNORMAL %
EOSINOPHIL NFR BLD MANUAL: 7 %
ERYTHROCYTE [DISTWIDTH] IN BLOOD BY AUTOMATED COUNT: 16 % (ref 10–15)
ERYTHROCYTE [DISTWIDTH] IN BLOOD BY AUTOMATED COUNT: 16.1 % (ref 10–15)
GLUCOSE BLDC GLUCOMTR-MCNC: 87 MG/DL (ref 70–99)
GLUCOSE BLDC GLUCOMTR-MCNC: 89 MG/DL (ref 70–99)
GLUCOSE SERPL-MCNC: 89 MG/DL (ref 70–99)
HCO3 BLDV-SCNC: 32 MMOL/L (ref 21–28)
HCO3 BLDV-SCNC: 32 MMOL/L (ref 21–28)
HCO3 BLDV-SCNC: 34 MMOL/L (ref 21–28)
HCT VFR BLD AUTO: 27.5 % (ref 35–47)
HCT VFR BLD AUTO: 31.8 % (ref 35–47)
HGB BLD-MCNC: 8.5 G/DL (ref 11.7–15.7)
HGB BLD-MCNC: 9.8 G/DL (ref 11.7–15.7)
IMM GRANULOCYTES # BLD: ABNORMAL 10*3/UL
IMM GRANULOCYTES NFR BLD: ABNORMAL %
LYMPHOCYTES # BLD AUTO: ABNORMAL 10*3/UL
LYMPHOCYTES # BLD MANUAL: 1.1 10E3/UL (ref 0.8–5.3)
LYMPHOCYTES NFR BLD AUTO: ABNORMAL %
LYMPHOCYTES NFR BLD MANUAL: 20 %
MCH RBC QN AUTO: 31.1 PG (ref 26.5–33)
MCH RBC QN AUTO: 31.5 PG (ref 26.5–33)
MCHC RBC AUTO-ENTMCNC: 30.8 G/DL (ref 31.5–36.5)
MCHC RBC AUTO-ENTMCNC: 30.9 G/DL (ref 31.5–36.5)
MCV RBC AUTO: 101 FL (ref 78–100)
MCV RBC AUTO: 102 FL (ref 78–100)
METAMYELOCYTES # BLD MANUAL: 0.1 10E3/UL
METAMYELOCYTES NFR BLD MANUAL: 2 %
MONOCYTES # BLD AUTO: ABNORMAL 10*3/UL
MONOCYTES # BLD MANUAL: 0.2 10E3/UL (ref 0–1.3)
MONOCYTES NFR BLD AUTO: ABNORMAL %
MONOCYTES NFR BLD MANUAL: 3 %
MYELOCYTES # BLD MANUAL: 0.1 10E3/UL
MYELOCYTES NFR BLD MANUAL: 1 %
NEUTROPHILS # BLD AUTO: ABNORMAL 10*3/UL
NEUTROPHILS # BLD MANUAL: 3.7 10E3/UL (ref 1.6–8.3)
NEUTROPHILS NFR BLD AUTO: ABNORMAL %
NEUTROPHILS NFR BLD MANUAL: 66 %
NRBC # BLD AUTO: 0 10E3/UL
NRBC BLD AUTO-RTO: 0 /100
O2/TOTAL GAS SETTING VFR VENT: 100 %
O2/TOTAL GAS SETTING VFR VENT: 2 %
O2/TOTAL GAS SETTING VFR VENT: 45 %
OXYHGB MFR BLDV: 31 % (ref 70–75)
OXYHGB MFR BLDV: 70 % (ref 70–75)
OXYHGB MFR BLDV: 88 % (ref 70–75)
PCO2 BLDV: 49 MM HG (ref 40–50)
PCO2 BLDV: 58 MM HG (ref 40–50)
PCO2 BLDV: 59 MM HG (ref 40–50)
PH BLDV: 7.35 [PH] (ref 7.32–7.43)
PH BLDV: 7.37 [PH] (ref 7.32–7.43)
PH BLDV: 7.41 [PH] (ref 7.32–7.43)
PLAT MORPH BLD: ABNORMAL
PLATELET # BLD AUTO: 147 10E3/UL (ref 150–450)
PLATELET # BLD AUTO: 147 10E3/UL (ref 150–450)
PLATELET # BLD AUTO: 164 10E3/UL (ref 150–450)
PO2 BLDV: 23 MM HG (ref 25–47)
PO2 BLDV: 41 MM HG (ref 25–47)
PO2 BLDV: 53 MM HG (ref 25–47)
POTASSIUM SERPL-SCNC: 3.3 MMOL/L (ref 3.4–5.3)
POTASSIUM SERPL-SCNC: 3.4 MMOL/L (ref 3.4–5.3)
POTASSIUM SERPL-SCNC: 3.8 MMOL/L (ref 3.4–5.3)
PROT SERPL-MCNC: 6.8 G/DL (ref 6.4–8.3)
RBC # BLD AUTO: 2.73 10E6/UL (ref 3.8–5.2)
RBC # BLD AUTO: 3.11 10E6/UL (ref 3.8–5.2)
RBC MORPH BLD: ABNORMAL
RETICS # AUTO: 0.06 10E6/UL (ref 0.03–0.1)
RETICS/RBC NFR AUTO: 2.1 % (ref 0.5–2)
SAO2 % BLDV: 31.2 % (ref 70–75)
SAO2 % BLDV: 71.4 % (ref 70–75)
SAO2 % BLDV: 89 % (ref 70–75)
SMUDGE CELLS BLD QL SMEAR: PRESENT
SODIUM SERPL-SCNC: 140 MMOL/L (ref 135–145)
WBC # BLD AUTO: 4.4 10E3/UL (ref 4–11)
WBC # BLD AUTO: 5.6 10E3/UL (ref 4–11)

## 2024-02-01 PROCEDURE — 85049 AUTOMATED PLATELET COUNT: CPT | Performed by: HOSPITALIST

## 2024-02-01 PROCEDURE — 97530 THERAPEUTIC ACTIVITIES: CPT | Mod: GP

## 2024-02-01 PROCEDURE — 85060 BLOOD SMEAR INTERPRETATION: CPT | Performed by: PATHOLOGY

## 2024-02-01 PROCEDURE — 85027 COMPLETE CBC AUTOMATED: CPT | Performed by: HOSPITALIST

## 2024-02-01 PROCEDURE — 36415 COLL VENOUS BLD VENIPUNCTURE: CPT | Performed by: HOSPITALIST

## 2024-02-01 PROCEDURE — 85045 AUTOMATED RETICULOCYTE COUNT: CPT | Performed by: HOSPITALIST

## 2024-02-01 PROCEDURE — 250N000013 HC RX MED GY IP 250 OP 250 PS 637: Performed by: HOSPITALIST

## 2024-02-01 PROCEDURE — 99232 SBSQ HOSP IP/OBS MODERATE 35: CPT | Performed by: HOSPITALIST

## 2024-02-01 PROCEDURE — 97116 GAIT TRAINING THERAPY: CPT | Mod: GP

## 2024-02-01 PROCEDURE — 82040 ASSAY OF SERUM ALBUMIN: CPT | Performed by: HOSPITALIST

## 2024-02-01 PROCEDURE — 210N000002 HC R&B HEART CARE

## 2024-02-01 PROCEDURE — 999N000157 HC STATISTIC RCP TIME EA 10 MIN

## 2024-02-01 PROCEDURE — 250N000011 HC RX IP 250 OP 636: Performed by: HOSPITALIST

## 2024-02-01 PROCEDURE — 94660 CPAP INITIATION&MGMT: CPT

## 2024-02-01 PROCEDURE — 82805 BLOOD GASES W/O2 SATURATION: CPT | Performed by: HOSPITALIST

## 2024-02-01 PROCEDURE — 99233 SBSQ HOSP IP/OBS HIGH 50: CPT | Performed by: INTERNAL MEDICINE

## 2024-02-01 PROCEDURE — 84132 ASSAY OF SERUM POTASSIUM: CPT | Performed by: HOSPITALIST

## 2024-02-01 PROCEDURE — 85007 BL SMEAR W/DIFF WBC COUNT: CPT | Performed by: HOSPITALIST

## 2024-02-01 RX ORDER — POTASSIUM CHLORIDE 1500 MG/1
40 TABLET, EXTENDED RELEASE ORAL ONCE
Status: COMPLETED | OUTPATIENT
Start: 2024-02-01 | End: 2024-02-01

## 2024-02-01 RX ADMIN — LEVOTHYROXINE SODIUM 250 MCG: 100 TABLET ORAL at 06:55

## 2024-02-01 RX ADMIN — MICONAZOLE NITRATE: 2 POWDER TOPICAL at 09:04

## 2024-02-01 RX ADMIN — POTASSIUM CHLORIDE 40 MEQ: 1500 TABLET, EXTENDED RELEASE ORAL at 06:56

## 2024-02-01 RX ADMIN — FOLIC ACID 1 MG: 1 TABLET ORAL at 09:02

## 2024-02-01 RX ADMIN — ENOXAPARIN SODIUM 40 MG: 40 INJECTION SUBCUTANEOUS at 17:31

## 2024-02-01 RX ADMIN — ENOXAPARIN SODIUM 40 MG: 40 INJECTION SUBCUTANEOUS at 04:26

## 2024-02-01 ASSESSMENT — ACTIVITIES OF DAILY LIVING (ADL)
ADLS_ACUITY_SCORE: 30
ADLS_ACUITY_SCORE: 30
ADLS_ACUITY_SCORE: 34
ADLS_ACUITY_SCORE: 30
ADLS_ACUITY_SCORE: 34
ADLS_ACUITY_SCORE: 30
ADLS_ACUITY_SCORE: 34
ADLS_ACUITY_SCORE: 34

## 2024-02-01 NOTE — PROGRESS NOTES
Writer reached out to [facility] and spoke with Tanisha with RT regarding patient needing Bipap at discharge. Please see below for recommended testing to qualify.    In order to qualify patient for a BIPAP S (-[id number] without rate) patient must have a diagnosis of obesity hypoventilation syndrome and the following criteria must be met:      Patient must have a ABG with a PaCO2 ? 45%.   And, a Pulmonary Function test or bedside spirometry showing a FEV1/FVC ? 70% of predicted  And, do not wear bipap at night ABG drawn during sleep or immediately upon awakening while on prescribed FiO2 that shows PaCO2 worsened ? 7mmHg compared to baseline draw     Patient will be eligible for a BIPAP S (without rate/) only if criteria from above is met.      Tanisha Singh, RRT  [facility]  [phone]

## 2024-02-01 NOTE — PROGRESS NOTES
Thank you [facility] for the referral for bipap for this patient.  In order to qualify patient for a BIPAP S (-EZXZF without rate) patient must have a diagnosis of obesity hypoventilation syndrome and the following criteria must be met:      Patient must have a ABG with a PaCO2 ? 45%.   And, a Pulmonary Function test or bedside spirometry showing a FEV1/FVC ? 70% of predicted  And, do not wear bipap at night ABG drawn during sleep or immediately upon awakening while on prescribed FiO2 that shows PaCO2 worsened ? 7mmHg compared to baseline draw     Patient will be eligible for a BIPAP S (without rate/) only if criteria from above is met.     Received message from [place], care coordinator, with referral. Returned call and left voicemail providing my direct line for call back.     Tanisha Singh, RRT  [facility]  [phone]

## 2024-02-01 NOTE — PLAN OF CARE
Orientations: A/O x4. BiPAP over night 45% FiO2. 5 L nc during the day.  Vitals/Pain: wdl  Tele:  SR  Lines/Drains: R brachial PICC converted to midline and R FA, L hand piv all patent and saline locked  Skin/Wounds: Redness to folds. Areas cleansed and antifungal powder applied. Inter dry to abd folds  GI/: Gonzales cath patent - moderate output  Labs: Abnormal/Trends, Electrolyte Replacement- Replaced K+. Hgb 8.5.  Ambulation/Assist: 2-3 person assist/mechanical lift  Sleep Quality: Pt reports good sleep on Bipap  Plan: Pt has been NPO. BG stable overnight.  MD to decide on a diet today.               Goal Outcome Evaluation:  Plan of Care Reviewed With: patient, spouse, friend  Overall Patient Progress: improvingOverall Patient Progress: improving

## 2024-02-01 NOTE — PROGRESS NOTES
[facility]    Pulmonary, Allergy, Critical Care and Sleep Medicine    Pulmonary Consult Progress Note    Yodit Harvey MRN# [id number]   Age: 43 year old YOB: 1980     Date of Admission: 1/27/2024  Date of Service: 02/01/2024     ==================================================  INTERVAL EVENTS:    Feels improved, awake and eating food without any problems.  She has noted s bruise of the nose secondary to BiPAP mask.  Persistent hypercapnia but not acidotic.  Patient is alert awake and denies any respiratory distress.       CHANGES FOR TODAY:    -Agree with breaks from BiPAP and monitoring of mental status.  Anticipate need for BiPAP as needed and she definitely requires BiPAP overnight.  She would also require BiPAP to be set up at home and this will need to be arranged prior to discharge.       ==================================================    ASSESSMENT AND RECOMMENDATIONS:    Probable obesity hypoventilation syndrome with suspected obstructive sleep apnea  Acute on chronic hypoxic respiratory failure with hypercapnia  Morbid obesity       Initially presented for dizziness and falls, found to be in SVT, but also hypoxic and hypercarbic.  Overnight oximetry with 42 minutes below 88% despite supplemental oxygen.  Venous gases 1/29 with pH between 7.17 and 7.21 with minimal improvement on BiPAP so she was transferred to the ICU for closer monitoring.    Once she has recovered from this acute episode, she will need overnight assessment of CO2 accumulation by ABG.  Unfortunately due to her habitus we have not been able to obtain an ABG so she will need to have an arterial line placed have an ABG collected when she is awake, and another early in the morning after she has been sleeping without BiPAP to assess whether or not her CO2 accumulates overnight.  This will be necessary to qualify her for home BiPAP     Continue BiPAP but okay with breaks from BiPAP and  "monitoring of mental status in between.  Anticipate need for BiPAP as needed and she definitely requires BiPAP overnight.  She would also require BiPAP to be set up at home and this will need to be arranged prior to discharge.  Target oxygen saturations between 88 and 92%  Agree with cardiology input-if diuresis plan, would be cautious given propensity for hypercapnia.  She will need full sleep medicine assessment following discharge      She continues to require close monitoring for risk for deterioration of respiratory status and impending respiratory failure.      Ethan Holliday MD [facility]  Associate Professor of Medicine  Division of Pulmonary, Allergy & Critical Care   [facility]  [facility]      ==================================================      PHYSICAL EXAM  /52   Pulse 85   Temp 97.6  F (36.4  C) (Oral)   Resp 14   Ht 1.626 m (5' 4\")   Wt (!) 212.3 kg (468 lb 1.6 oz)   SpO2 94%   BMI 80.35 kg/m        Intake/Output Summary (Last 24 hours) at 1/30/2024 0931  Last data filed at 1/30/2024 0600  Gross per 24 hour   Intake 567.5 ml   Output 810 ml   Net -242.5 ml       Vitals:    01/27/24 0458 01/28/24 0629 02/01/24 0925   Weight: (!) 224 kg (493 lb 13.3 oz) (!) 224 kg (493 lb 13.3 oz) (!) 212.3 kg (468 lb 1.6 oz)         General: Alert, interactive, NAD  Resp: CTAB, no crackles or wheezes  Cardiac: Tachy, regular, NS1,S2, No m/r/g  Extremities: No LE edema or obvious joint abnormalities  Skin: Warm and dry, no jaundice or rash  Neuro: Alert & oriented x 3, Cns 2-12 intact, moves all extremities equally      Recent Labs   Lab Test 02/01/24  1117 02/01/24  0425 01/31/24  1536   WBC 5.6 4.4 4.6   RBC 3.11* 2.73* 2.74*   HGB 9.8* 8.5* 8.5*    147*  147* 145*       Recent Labs   Lab Test 02/01/24  1401 02/01/24  1117 02/01/24  0425 02/01/24  0134 01/31/24  2004 01/31/24  1536 01/31/24  0346 01/28/24  0542 01/27/24  0450   NA  --   --  140  --  " " --  141 140   < > 139   POTASSIUM  --  3.8 3.4  3.3*  --   --  3.4 3.8   < > 4.2   CHLORIDE  --   --  100  --   --  102 98   < > 98   CO2  --   --  30*  --   --  32* 34*   < > 27   BUN  --   --  19.5  --   --  19.8 19.0   < > 16.1   CR  --   --  1.05*  --   --  1.07* 1.11*   < > 1.47*   GLC 89  --  89 87   < > 226* 94   < > 158*   BECKY  --   --  9.0  --   --  8.7 9.1   < > 9.3   MAG  --   --   --   --   --   --   --   --  2.0    < > = values in this interval not displayed.           No results for input(s): \"CULT\" in the last 168 hours.      Recent Results (from the past 48 hour(s))   IR PICC Placement > 5 Yrs of Age    Narrative    IR PICC PLACEMENT > 5 YEARS OF AGE 1/30/2024 5:10 PM    HISTORY: 43-year-old woman with need for long-term IV access. Attempts  were made for access by IV team, though unsuccessful. Request made for  placement in IR. Patient is greater than 500 pounds, difficult to  position and document location.    TECHNIQUE: Patient was brought to interventional radiology department  after informed consent obtained. Patient was placed in a supine  position. Maximal Sterile Barrier Technique Utilized: Cap AND mask AND  sterile gown AND sterile gloves AND sterile full body drape AND hand  hygiene AND skin preparation 2% chlorhexidine for cutaneous antisepsis  (or acceptable alternative antiseptics). Sterile Ultrasound Technique  Utilized ?Sterile gel AND sterile probe covers. 1% lidocaine was used  for local anesthetic to identify the basilic vein in the upper arm,  patency confirmed, and ultrasound image stored for documentation.  Micropuncture kit was used to access the basilic vein. A peel-away  sheath was placed and a PICC was advanced until the right atrial/SVC  junction, somewhat difficult to document as patient was partially on  table, though unable to fully migrate to the table given body habitus.  The triple-lumen PICC is 50 cm in length with 2 cm external to the  skin surface. The 3 lumens " aspirated normally and flushed with normal  saline. Of note, the PICC micropuncture access kit was used to access  the basilic vein, without difficulty.    Sedation: None  Fluoroscopic time: 1.6 minutes  Air Kerma: 57.5 mGy  Local anesthetic: 8 mL of 1% lidocaine. Four spot fluoroscopic images  obtained demonstrate placement of PICC tip at the RA/SVC junction.      Impression    IMPRESSION: Successful placement of triple lumen PICC in the right  upper arm. PICC is ready for immediate use.    GERMAN KULKARNI MD         SYSTEM ID:  [id number]   XR Chest Port 1 View    Narrative    CHEST ONE VIEW  1/31/2024 1:55 PM     HISTORY: Verify PICC .    COMPARISON: Chest radiograph 1/27/2024. Fluoroscopic image 1/30/2024.      Impression    IMPRESSION: Patient is rotated.     PICC tip projects in the region of the brachiocephalic  confluence/upper SVC. This could be advanced by approximately 8-9 cm.  This appears retracted from the recent fluoroscopic image given  differences in technique.     Left midlung atelectasis. No focal consolidation, pleural effusion or  pneumothorax. Cardiomediastinal silhouette is unremarkable.    VLADIMIR SHETH MD         SYSTEM ID:  [id number]     I spent 50 minutes dedicated to this care so far today excluding procedures, including review of medical records, review of imaging (results & images), time with patient and time in documentation.

## 2024-02-01 NOTE — PLAN OF CARE
Yodit is alert, oriented, pleasant. Oxygen weaned from 5L via nasal cannula to 2L today. Did not need  BiPAP support today. Ambulated with PT in the hallway. Up with SBA and walker. Plan for continued BiPAP at night and as needed for naps or support during the day. Discharge plans pending.

## 2024-02-01 NOTE — PROGRESS NOTES
[facility]    Medicine Progress Note - Hospitalist Service    Date of Admission:  1/27/2024    Assessment & Plan     Yodit Harvey is a 43 year old female with past medical history significant for hypothyroidism and morbid obesity admitted on 1/27/2024 with dizziness and falls, found to be in SVT. She is also found to have mild hypoxia and compensated hypercapnia.       Respiratory failure with hypoxemia and hypercapnia likely due to OHS and JL  Respiratory acidosis  Hypercapnia- suspect chronic, likely OHS and JL   History of recent COVID-19 infection in December 2023  Encephalopathy likely due to hypercapnia-improved  -On admission patient endorsed some shortness of breath following conversion to NSR. O2 sats in the ED down to mid 80s at times. VBG showed elevated pCO2 to 62 but with a normal pH, so this is presumed chronic.   -CXR was unremarkable but limited due to body habitus.   - CTPE negative for pulmonary embolism. Mild dependent probable atelectasis vs less likely infiltrate.   -Echo on admission showed EF of 55 to 60%, LV diastolic function is indeterminate and mild RV volume overload  - she was being monitored on the cardiology floor and patient VBG on 1/29 were consistent with severe respiratory acidosis, hypercapnia and worsening encephalopathy and she was transferred to ICU where she was seen by the ICU attending and along with pulmonary and patient was continued with BiPAP with improvement in her blood gases with improvement in mentation and hypercapnia has improved  -Status 1 dose of Lasix in the ICU  -Elevated proBNP as per cardiology is due to obesity hypoventilation syndrome and use diuresis on as-needed basis depending on renal function  -Transferred back to [facility] on 1/31/2024  -Clinically on exam today patient was taken off BiPAP and started on diet and was on 2 L of oxygen and is alert oriented x 3 and is following commands and discussed with nursing staff and  continue with BiPAP during naps and bedtime and monitor closely  -She will need BiPAP at discharge and pulmonary is following  -Did speak with the case management team      Mild hypokalemia-resolved      SVT   Dizziness, falls likely 2/2 above   -Pt presented to the ED after two falls. The first fall she reports was a trip and fall, however when getting up she started feeling dizzy so sat down. When she got up she again started to feel dizzy and was assisted to the ground by her . She thinks though that she might have passed out briefly during the second fall. When EMS arrived her HR was 177.   - EKG in the ED showed SVT. She was given adenosine x2 and she eventually returned to a normal sinus rhythm.   -Patient evaluated by the cardiology team and initial differentials included heart failure with preserved ejection fraction versus high-output heart failure and they were not concerned about ischemia and they recommended to treat her underlying thyroid issues  -Patient was being monitored in the hospital and troponins peaked at 170 and they have trended down   -Echo on admission showed EF of 55 to 60%, LV diastolic function is indeterminate and mild RV volume overload  -Heart rate seems to be stable for now    Troponin elevation likely due to demand  -On admission presented without any chest pain and troponins peaked at around 170s and trended down   - TTE and Cardiology consult as noted above       acute kidney injury-resolved  -Creatinine on admission is 1.47. No prior creatinine on file.   -Renal function has been fluctuating and is on the higher side and is 1.05  -Will monitor daily and diuretics should be used cautiously     Uncontrolled hypothyroidism   Pt with known hx of hypothyroidism. PTA is on levothyroxine 250mcg daily, but has not been taking for years and prior to that was only intermittently taking  - TSH is 144 and T4 is 0.18   -Continue with PTA levothyroxine at 250 mcg    -Will recommend to  "follow-up with endocrinology     Mild chronic macrocytic anemia   -Hemoglobin on admission was 10.6 and in EMR last recorded value is in 2016 when it was 11.6  -B12, folate and iron studies are adequate  -I do not see any bleeding but I have noticed that she had a lot of oozing from the midline yesterday and also has been having multiple blood draws and will check FOBT  -I think the acute drop in the hospital can be due to multiple blood draws but continue to monitor and there is no evidence of any active bleeding  - peripheral smear sent and results are pending  --Hemoglobin today is uptrending and is 9.8    Hyperglycemia  -  HbA1c during hospitalization is 5.7    Headache-resolved  -On exam she does not have any focal deficits and I think her headache is most likely due to CO2 narcosis and the patient was on BiPAP this morning and we can use IV Toradol as needed     Morbid obesity   Body mass index is 82.18 kg/m .  Pt reports she has gained over 200lbs since the start of COVID.   - Discussed briefly recommendations for weight loss   - Treating hypothyroidism should help   - Would also recommend referral to endocrinology or comprehensive weight management clinic.              Diet: Regular Diet Adult    DVT Prophylaxis: Pneumatic Compression Devices, subcu Lovenox  Gonzales Catheter: PRESENT, indication: Strict 1-2 Hour I&O;Retention  Lines: PRESENT      [REMOVED] PICC 01/30/24 Triple Lumen Right Brachial vein medial-Site Assessment: WDL    Cardiac Monitoring: ACTIVE order. Indication: Tachyarrhythmias, acute (48 hours)  Code Status: Full Code      Clinically Significant Risk Factors        # Hypokalemia: Lowest K = 3.3 mmol/L in last 2 days, will replace as needed                  # Severe Obesity: Estimated body mass index is 80.35 kg/m  as calculated from the following:    Height as of this encounter: 1.626 m (5' 4\").    Weight as of this encounter: 212.3 kg (468 lb 1.6 oz).             Disposition Plan    "   Expected Discharge Date: 02/03/2024      Destination: home            Alma Christopher MD  Hospitalist Service  [facility]  Securely message with [employer] (more info)  Text page via [employer] Paging/Directory   _  She is critically ill and her prognosis is guarded     _____________________________________________________________________    Interval History     Seen today and patient was sitting in the chair and was off BiPAP and is alert oriented x 3 and denied any headache.  She did mention that while she was in the ICU on her right arm there was a rash redness but there was nothing present when I saw the patient with the nurse.   was not present but I called him later on on 315.    Physical Exam   Vital Signs: Temp: 97.6  F (36.4  C) Temp src: Oral BP: 106/52 Pulse: 85   Resp: 14 SpO2: 94 % O2 Device: Nasal cannula Oxygen Delivery: 3 LPM  Weight: 468 lbs 1.6 oz        General: Patient appears comfortable and in no acute distress.  Respiratory: Lungs are clear to auscultation bilaterally with no wheeze or crackles   Cardiovascular: Regular rate , S1 and S2 normal with no murmer or rubs or gallops and has bilateral swelling and her edema seems to be nonpitting  Abdomen:   soft , non tender , non distended and bowel sound present and she is on nasal cannula  Neurologic: No facial droop  Musculoskeletal: Normal Range of motion over upper and lower extremities bilaterally   Psychiatric: cooperative      Medical Decision Making           Time spent in care of patient is 45 minutes and reviewed labs and discussed plan of care in detail with the patient and the nursing staff.  I also called the  later in the day today.    Data     I have personally reviewed the following data over the past 24 hrs:    5.6  \   9.8 (L)   / 164     140 100 19.5 /  89   3.8 30 (H) 1.05 (H) \     ALT: 19 AST: 33 AP: 42 TBILI: 0.4   ALB: 3.8 TOT PROTEIN: 6.8 LIPASE: N/A     Ferritin:  N/A % Retic:  2.1 (H) LDH:   N/A       Imaging results reviewed over the past 24 hrs:   No results found for this or any previous visit (from the past 24 hour(s)).

## 2024-02-01 NOTE — PROGRESS NOTES
Patient was placed on BiPAP 20/8 Oxygen  45  %; Alarm Volume 10. Skin Assessment: Intact      Patient tolerating well.  RT will continue to monitor.     Maria De Jesus Durham, RT,   2/1/2024 5:59 AM

## 2024-02-01 NOTE — PLAN OF CARE
"Goal Outcome Evaluation:      Plan of Care Reviewed With: patient, spouse, friend    Overall Patient Progress: no changeOverall Patient Progress: no change    Outcome Evaluation: Pt AOx4, neurologically intact, no complaints of pain, headache, or discomfort today. Up to chair with PT, walked in room with BiPap on. BiPap off for approx 30mins during med administration and morning assessment; tolerated well on 3LPM NC. Trending low UO, 40 q2h. Tx to heart center this evening. Vascular assess PICC line, PICC line is now a MIDLINE, see vascular notes and Xray.       Problem: Adult Inpatient Plan of Care  Goal: Plan of Care Review  Description: The Plan of Care Review/Shift note should be completed every shift.  The Outcome Evaluation is a brief statement about your assessment that the patient is improving, declining, or no change.  This information will be displayed automatically on your shift  note.  Outcome: Adequate for Care Transition  Flowsheets (Taken 1/31/2024 1934)  Outcome Evaluation:   Pt AOx4, neurologically intact, no complaints of pain, headache, or discomfort today. Up to chair with PT, walked in room with BiPap on. BiPap off for approx 30mins during med administration and morning assessment   tolerated well on 3LPM NC. Trending low UO, 40 q2h. Tx to heart center this evening.  Plan of Care Reviewed With:   patient   spouse   friend  Overall Patient Progress: no change  Goal: Patient-Specific Goal (Individualized)  Description: You can add care plan individualizations to a care plan. Examples of Individualization might be:  \"Parent requests to be called daily at 9am for status\", \"I have a hard time hearing out of my right ear\", or \"Do not touch me to wake me up as it startles  me\".  Outcome: Adequate for Care Transition  Goal: Absence of Hospital-Acquired Illness or Injury  Outcome: Adequate for Care Transition  Intervention: Identify and Manage Fall Risk  Recent Flowsheet Documentation  Taken 1/31/2024 " 1600 by Tierra Rowe RN  Safety Promotion/Fall Prevention:   activity supervised   room near nurse's station   safety round/check completed   supervised activity  Taken 1/31/2024 1200 by Tierra Rowe RN  Safety Promotion/Fall Prevention:   activity supervised   room near nurse's station   safety round/check completed   supervised activity  Taken 1/31/2024 0800 by Tierra Rowe RN  Safety Promotion/Fall Prevention:   activity supervised   room near nurse's station   safety round/check completed   supervised activity  Intervention: Prevent Skin Injury  Recent Flowsheet Documentation  Taken 1/31/2024 1600 by Tierra Rowe RN  Body Position:   turned   left  Taken 1/31/2024 1500 by Tierra Rowe RN  Body Position:   turned   left  Taken 1/31/2024 1400 by Tierra Rowe RN  Body Position:   turned   right  Taken 1/31/2024 1200 by Tierra Rowe RN  Body Position:   turned   left  Taken 1/31/2024 1100 by Tierra Rowe RN  Body Position:   right   turned  Taken 1/31/2024 0800 by Tierra Rowe RN  Body Position:   turned   left  Intervention: Prevent and Manage VTE (Venous Thromboembolism) Risk  Recent Flowsheet Documentation  Taken 1/31/2024 1600 by Tierra Rowe RN  VTE Prevention/Management: patient refused intervention  Taken 1/31/2024 1200 by Tierra Rowe RN  VTE Prevention/Management: patient refused intervention  Taken 1/31/2024 0800 by Tierra Rowe RN  VTE Prevention/Management: patient refused intervention  Intervention: Prevent Infection  Recent Flowsheet Documentation  Taken 1/31/2024 1600 by Tierra Rowe RN  Infection Prevention:   environmental surveillance performed   equipment surfaces disinfected   hand hygiene promoted   personal protective equipment utilized   rest/sleep promoted   single patient room provided  Taken 1/31/2024 1200 by Tierra Rowe RN  Infection Prevention:   environmental surveillance performed   equipment surfaces disinfected   hand hygiene  promoted   personal protective equipment utilized   rest/sleep promoted   single patient room provided  Taken 1/31/2024 0800 by Tierra Rowe RN  Infection Prevention:   environmental surveillance performed   equipment surfaces disinfected   hand hygiene promoted   personal protective equipment utilized   rest/sleep promoted   single patient room provided  Goal: Optimal Comfort and Wellbeing  Outcome: Adequate for Care Transition  Intervention: Provide Person-Centered Care  Recent Flowsheet Documentation  Taken 1/31/2024 1600 by Tierra Rowe RN  Trust Relationship/Rapport:   care explained   choices provided   reassurance provided  Taken 1/31/2024 1200 by Tierra Rowe RN  Trust Relationship/Rapport:   care explained   choices provided   reassurance provided  Taken 1/31/2024 0800 by Tierra Rowe RN  Trust Relationship/Rapport:   care explained   choices provided   reassurance provided  Goal: Readiness for Transition of Care  Outcome: Adequate for Care Transition     Problem: Gas Exchange Impaired  Goal: Optimal Gas Exchange  Outcome: Adequate for Care Transition  Intervention: Optimize Oxygenation and Ventilation  Recent Flowsheet Documentation  Taken 1/31/2024 1600 by Tierra Rowe RN  Head of Bed (HOB) Positioning: HOB at 30-45 degrees  Taken 1/31/2024 1500 by Tierra Rowe RN  Head of Bed (HOB) Positioning: HOB at 30-45 degrees  Taken 1/31/2024 1400 by Tierra Rowe RN  Head of Bed (HOB) Positioning: HOB at 30-45 degrees  Taken 1/31/2024 1200 by Tierra Rowe RN  Head of Bed (HOB) Positioning: HOB at 30-45 degrees  Taken 1/31/2024 1100 by Tierra Rowe RN  Head of Bed (HOB) Positioning: HOB at 30-45 degrees  Taken 1/31/2024 0800 by Tierra Rowe RN  Head of Bed (HOB) Positioning: HOB at 30-45 degrees     Problem: Fall Injury Risk  Goal: Absence of Fall and Fall-Related Injury  Outcome: Adequate for Care Transition  Intervention: Identify and Manage Contributors  Recent Flowsheet  Documentation  Taken 1/31/2024 1600 by Tierra Rowe RN  Medication Review/Management: medications reviewed  Taken 1/31/2024 1200 by Tierra Rowe RN  Medication Review/Management: medications reviewed  Taken 1/31/2024 0800 by Tierra Rowe RN  Medication Review/Management: medications reviewed  Intervention: Promote Injury-Free Environment  Recent Flowsheet Documentation  Taken 1/31/2024 1600 by Tierra Rowe RN  Safety Promotion/Fall Prevention:   activity supervised   room near nurse's station   safety round/check completed   supervised activity  Taken 1/31/2024 1200 by Tierra Rowe RN  Safety Promotion/Fall Prevention:   activity supervised   room near nurse's station   safety round/check completed   supervised activity  Taken 1/31/2024 0800 by Tierra Rowe RN  Safety Promotion/Fall Prevention:   activity supervised   room near nurse's station   safety round/check completed   supervised activity

## 2024-02-02 ENCOUNTER — DOCUMENTATION ONLY (OUTPATIENT)
Dept: HOME HEALTH SERVICES | Facility: CLINIC | Age: 44
End: 2024-02-02
Payer: COMMERCIAL

## 2024-02-02 ENCOUNTER — APPOINTMENT (OUTPATIENT)
Dept: OCCUPATIONAL THERAPY | Facility: CLINIC | Age: 44
End: 2024-02-02
Attending: HOSPITALIST
Payer: COMMERCIAL

## 2024-02-02 ENCOUNTER — APPOINTMENT (OUTPATIENT)
Dept: PHYSICAL THERAPY | Facility: CLINIC | Age: 44
End: 2024-02-02
Payer: COMMERCIAL

## 2024-02-02 LAB
ALLEN'S TEST: YES
ALLEN'S TEST: YES
ANION GAP SERPL CALCULATED.3IONS-SCNC: 7 MMOL/L (ref 7–15)
BASE EXCESS BLDA CALC-SCNC: 3.5 MMOL/L (ref -3–3)
BASE EXCESS BLDA CALC-SCNC: 3.8 MMOL/L (ref -3–3)
BUN SERPL-MCNC: 21.8 MG/DL (ref 6–20)
CALCIUM SERPL-MCNC: 9.3 MG/DL (ref 8.6–10)
CHLORIDE SERPL-SCNC: 101 MMOL/L (ref 98–107)
COHGB MFR BLD: 95.8 % (ref 96–97)
COHGB MFR BLD: 97.3 % (ref 96–97)
CREAT SERPL-MCNC: 1.05 MG/DL (ref 0.51–0.95)
DEPRECATED HCO3 PLAS-SCNC: 31 MMOL/L (ref 22–29)
EGFRCR SERPLBLD CKD-EPI 2021: 66 ML/MIN/1.73M2
ERYTHROCYTE [DISTWIDTH] IN BLOOD BY AUTOMATED COUNT: 15.9 % (ref 10–15)
GLUCOSE SERPL-MCNC: 108 MG/DL (ref 70–99)
HCO3 BLD-SCNC: 28 MMOL/L (ref 21–28)
HCO3 BLD-SCNC: 29 MMOL/L (ref 21–28)
HCT VFR BLD AUTO: 28.4 % (ref 35–47)
HGB BLD-MCNC: 8.7 G/DL (ref 11.7–15.7)
MCH RBC QN AUTO: 31.1 PG (ref 26.5–33)
MCHC RBC AUTO-ENTMCNC: 30.6 G/DL (ref 31.5–36.5)
MCV RBC AUTO: 101 FL (ref 78–100)
O2/TOTAL GAS SETTING VFR VENT: 21 %
O2/TOTAL GAS SETTING VFR VENT: 21 %
PCO2 BLD: 43 MM HG (ref 35–45)
PCO2 BLD: 48 MM HG (ref 35–45)
PH BLD: 7.39 [PH] (ref 7.35–7.45)
PH BLD: 7.43 [PH] (ref 7.35–7.45)
PLATELET # BLD AUTO: 149 10E3/UL (ref 150–450)
PO2 BLD: 73 MM HG (ref 80–105)
PO2 BLD: 76 MM HG (ref 80–105)
POTASSIUM SERPL-SCNC: 3.7 MMOL/L (ref 3.4–5.3)
RBC # BLD AUTO: 2.8 10E6/UL (ref 3.8–5.2)
SAO2 % BLDA: 94 % (ref 92–100)
SAO2 % BLDA: 96 % (ref 92–100)
SODIUM SERPL-SCNC: 139 MMOL/L (ref 135–145)
WBC # BLD AUTO: 4.5 10E3/UL (ref 4–11)

## 2024-02-02 PROCEDURE — 94660 CPAP INITIATION&MGMT: CPT

## 2024-02-02 PROCEDURE — 94060 EVALUATION OF WHEEZING: CPT

## 2024-02-02 PROCEDURE — 210N000002 HC R&B HEART CARE

## 2024-02-02 PROCEDURE — 250N000011 HC RX IP 250 OP 636: Performed by: HOSPITALIST

## 2024-02-02 PROCEDURE — 250N000013 HC RX MED GY IP 250 OP 250 PS 637: Performed by: HOSPITALIST

## 2024-02-02 PROCEDURE — 97535 SELF CARE MNGMENT TRAINING: CPT | Mod: GO

## 2024-02-02 PROCEDURE — 99232 SBSQ HOSP IP/OBS MODERATE 35: CPT | Performed by: HOSPITALIST

## 2024-02-02 PROCEDURE — 999N000157 HC STATISTIC RCP TIME EA 10 MIN

## 2024-02-02 PROCEDURE — 99254 IP/OBS CNSLTJ NEW/EST MOD 60: CPT | Performed by: PSYCHIATRY & NEUROLOGY

## 2024-02-02 PROCEDURE — 82805 BLOOD GASES W/O2 SATURATION: CPT | Performed by: INTERNAL MEDICINE

## 2024-02-02 PROCEDURE — 97116 GAIT TRAINING THERAPY: CPT | Mod: GP

## 2024-02-02 PROCEDURE — 97530 THERAPEUTIC ACTIVITIES: CPT | Mod: GP

## 2024-02-02 PROCEDURE — 85014 HEMATOCRIT: CPT | Performed by: HOSPITALIST

## 2024-02-02 PROCEDURE — 36600 WITHDRAWAL OF ARTERIAL BLOOD: CPT

## 2024-02-02 PROCEDURE — 82374 ASSAY BLOOD CARBON DIOXIDE: CPT | Performed by: HOSPITALIST

## 2024-02-02 PROCEDURE — 97165 OT EVAL LOW COMPLEX 30 MIN: CPT | Mod: GO

## 2024-02-02 PROCEDURE — 99233 SBSQ HOSP IP/OBS HIGH 50: CPT | Performed by: INTERNAL MEDICINE

## 2024-02-02 RX ORDER — IBUPROFEN 400 MG/1
400 TABLET, FILM COATED ORAL EVERY 6 HOURS PRN
Status: DISCONTINUED | OUTPATIENT
Start: 2024-02-02 | End: 2024-02-06 | Stop reason: HOSPADM

## 2024-02-02 RX ADMIN — MICONAZOLE NITRATE: 2 POWDER TOPICAL at 10:34

## 2024-02-02 RX ADMIN — FOLIC ACID 1 MG: 1 TABLET ORAL at 10:34

## 2024-02-02 RX ADMIN — MICONAZOLE NITRATE: 2 POWDER TOPICAL at 21:50

## 2024-02-02 RX ADMIN — SENNOSIDES AND DOCUSATE SODIUM 2 TABLET: 50; 8.6 TABLET ORAL at 21:08

## 2024-02-02 RX ADMIN — ENOXAPARIN SODIUM 40 MG: 40 INJECTION SUBCUTANEOUS at 17:47

## 2024-02-02 RX ADMIN — IBUPROFEN 400 MG: 400 TABLET ORAL at 15:47

## 2024-02-02 RX ADMIN — ENOXAPARIN SODIUM 40 MG: 40 INJECTION SUBCUTANEOUS at 05:50

## 2024-02-02 RX ADMIN — LEVOTHYROXINE SODIUM 250 MCG: 100 TABLET ORAL at 10:34

## 2024-02-02 ASSESSMENT — ACTIVITIES OF DAILY LIVING (ADL)
DEPENDENT_IADLS:: CLEANING;COOKING;LAUNDRY;SHOPPING;MEAL PREPARATION;TRANSPORTATION
PREVIOUS_RESPONSIBILITIES: MEDICATION MANAGEMENT;FINANCES;DRIVING;SHOPPING
ADLS_ACUITY_SCORE: 30

## 2024-02-02 NOTE — PROGRESS NOTES
[facility]    Medicine Progress Note - Hospitalist Service    Date of Admission:  1/27/2024    Assessment & Plan     Yodit Harvey is a 43 year old female with past medical history significant for hypothyroidism and morbid obesity admitted on 1/27/2024 with dizziness and falls, found to be in SVT. She is also found to have mild hypoxia and compensated hypercapnia.       Respiratory failure with hypoxemia and hypercapnia likely due to OHS and JL  Respiratory acidosis  Hypercapnia- suspect chronic, likely OHS and JL   History of recent COVID-19 infection in December 2023  Encephalopathy likely due to hypercapnia-Resolved  -On admission patient endorsed some shortness of breath following conversion to NSR. O2 sats in the ED down to mid 80s at times. VBG showed elevated pCO2 to 62 but with a normal pH, so this is presumed chronic.   -CXR was unremarkable but limited due to body habitus.   - CTPE negative for pulmonary embolism. Mild dependent probable atelectasis vs less likely infiltrate.   -Echo on admission showed EF of 55 to 60%, LV diastolic function is indeterminate and mild RV volume overload  - she was being monitored on the cardiology floor and patient VBG on 1/29 were consistent with severe respiratory acidosis, hypercapnia and worsening encephalopathy and she was transferred to ICU where she was seen by the ICU attending and along with pulmonary and patient was continued with BiPAP with improvement in her blood gases with improvement in mentation and hypercapnia has improved  -Status one dose of Lasix in the ICU  -Elevated proBNP as per cardiology is due to obesity hypoventilation syndrome and use diuresis on as-needed basis depending on renal function  -Transferred back to [facility] on 1/31/2024  -Patient has clinically improved and was on room air this morning and has been using BiPAP during nighttime  -Discussed with pulmonary, case management patient's nurse and she needs to meet  criteria to qualify for home BiPAP.  Blood gas was done this morning and it was reviewed and we will order another blood gas later in the day and also discussed with pulmonary, patient's nurse we have plan for doing blood gases later in the day without BiPAP during nighttime and if patient decompensates then BiPAP will be put in but RT will be notified as they will want to do ABG before BiPAP is potential    Mild hypokalemia-resolved    SVT   Dizziness, falls likely 2/2 above   -Pt presented to the ED after two falls. The first fall she reports was a trip and fall, however when getting up she started feeling dizzy so sat down. When she got up she again started to feel dizzy and was assisted to the ground by her . She thinks though that she might have passed out briefly during the second fall. When EMS arrived her HR was 177.   - EKG in the ED showed SVT. She was given adenosine x2 and she eventually returned to a normal sinus rhythm.   -Patient evaluated by the cardiology team and initial differentials included heart failure with preserved ejection fraction versus high-output heart failure and they were not concerned about ischemia and they recommended to treat her underlying thyroid issues  -Patient was being monitored in the hospital and troponins peaked at 170 and they have trended down   -Echo on admission showed EF of 55 to 60%, LV diastolic function is indeterminate and mild RV volume overload  -Heart rate seems to be stable for now    Troponin elevation likely due to demand  -On admission presented without any chest pain and troponins peaked at around 170s and trended down   - TTE and Cardiology consult as noted above       acute kidney injury-resolved  -Creatinine on admission is 1.47. No prior creatinine on file.   -Renal function has been fluctuating and is on the higher side and is 1.05  -Will monitor daily and diuretics should be used cautiously     Uncontrolled hypothyroidism   Pt with known hx  of hypothyroidism. PTA is on levothyroxine 250mcg daily, but has not been taking for years and prior to that was only intermittently taking  - TSH is 144 and T4 is 0.18   -Continue with PTA levothyroxine at 250 mcg    -Will recommend to follow-up with endocrinology     Mild chronic macrocytic anemia   -Hemoglobin on admission was 10.6 and in EMR last recorded value is in 2016 when it was 11.6  -B12, folate and iron studies are adequate  -I do not see any bleeding but I have noticed that she had a lot of oozing from the midline yesterday and also has been having multiple blood draws and will check FOBT  -I think the acute drop in the hospital can be due to multiple blood draws but continue to monitor and there is no evidence of any active bleeding  - peripheral smear sent and results are pending  --Hemoglobin has been relatively stable around 8.5    Hyperglycemia  -  HbA1c during hospitalization is 5.7    Depressed mood  -She denies any suicidal or homicidal ideation and did not wanted to talk to psychiatry team and they were consulted    Headache-resolved likely due to CO2 narcosis     Morbid obesity   Body mass index is 82.18 kg/m .  Pt reports she has gained over 200lbs since the start of COVID.   - Discussed briefly recommendations for weight loss   - Treating hypothyroidism should help   - Would also recommend referral to endocrinology or comprehensive weight management clinic.        Disposition  -Home with home care with RN, PT, OT and will not happen over the weekend as we are trying to see if she qualifies for BiPAP           Diet: Regular Diet Adult    DVT Prophylaxis: Pneumatic Compression Devices, subcu Lovenox  Gonzales Catheter: PRESENT, indication: Strict 1-2 Hour I&O  Lines: PRESENT           Cardiac Monitoring: ACTIVE order. Indication: Tachyarrhythmias, acute (48 hours)  Code Status: Full Code      Clinically Significant Risk Factors        # Hypokalemia: Lowest K = 3.3 mmol/L in last 2 days, will  "replace as needed                  # Severe Obesity: Estimated body mass index is 80.35 kg/m  as calculated from the following:    Height as of this encounter: 1.626 m (5' 4\").    Weight as of this encounter: 212.3 kg (468 lb 1.6 oz).             Disposition Plan      Expected Discharge Date: 02/05/2024      Destination: home            Alma Christopher MD  Hospitalist Service  [facility]  Securely message with [employer] (more info)  Text page via [employer] Paging/Directory   _    _____________________________________________________________________    Interval History     Seen today and patient is alert oriented x 3 and was sitting in the chair and was on room air.  Pulmonary team was visiting the patient and her blood pressure was 1/1/2012/61.  ABG was drawn.  Discussed with the pulmonary, RN and social work team and plan for repeating blood gases later in the day to see if she qualifies for BiPAP has been laid out.  Discussed with nursing staff and we will remove the Gonzales catheter.  Discussed with patient and she feels mildly depressed and denies any suicidal or homicidal ideation and consult psych was placed.  She is very motivated to lose weight and understand the importance of taking care of her health.    Physical Exam   Vital Signs: Temp: 97.1  F (36.2  C) Temp src: Axillary BP: 90/65 Pulse: 53   Resp: 20 SpO2: 100 % O2 Device: BiPAP/CPAP Oxygen Delivery: 2 LPM  Weight: 468 lbs 1.6 oz        General: aox3  Respiratory:ctla  Cardiovascular: Regular rate , S1 and S2 normal   Abdomen:   soft , non tender , non distended and bowel sound present and she is on nasal cannula  Neurologic: No facial droop  Musculoskeletal: Normal Range of motion over upper and lower extremities bilaterally   Psychiatric: cooperative      Medical Decision Making           Time spent in care of patient is 45 minutes and reviewed labs and discussed plan of care in detail with the patient, nursing staff, pulmonary team, " social work and plan of care was discussed    Data     I have personally reviewed the following data over the past 24 hrs:    4.5  \   8.7 (L)   / 149 (L)     139 101 21.8 (H) /  108 (H)   3.7 31 (H) 1.05 (H) \     Ferritin:  N/A % Retic:  2.1 (H) LDH:  N/A       Imaging results reviewed over the past 24 hrs:   No results found for this or any previous visit (from the past 24 hour(s)).

## 2024-02-02 NOTE — PROGRESS NOTES
Care Management Follow Up    Length of Stay (days): 6    Expected Discharge Date: 02/05/2024     Concerns to be Addressed: discharge planning     Patient plan of care discussed at interdisciplinary rounds: Yes    Anticipated Discharge Disposition: Home Care     Anticipated Discharge Services: None  Anticipated Discharge DME: [facility]    Patient/family educated on Medicare website which has current facility and service quality ratings:    Education Provided on the Discharge Plan: Yes  Patient/Family in Agreement with the Plan: yes    Referrals Placed by CM/SW: Homecare  Private pay costs discussed: Not applicable    Additional Information:  All [facility] homecare agency has accepted patient for RN/PT/OT; [phone], Fax: [phone]    Yamileth aLzo RN Care Coordinator  [facility]  [phone]

## 2024-02-02 NOTE — PROGRESS NOTES
02/02/24 1314   Appointment Info   Signing Clinician's Name / Credentials (OT) Prabha Mcfarlane OTR/L   Living Environment   People in Home significant other;child(pam), dependent  (children: 12 years old and 15 year old.)   Current Living Arrangements house  (rambler style home, laundry in basement)   Transportation Anticipated family or friend will provide  (Pt typically drives)   Living Environment Comments Has a walk in shower in the baement and a tub on the main level.   Self-Care   Usual Activity Tolerance good   Current Activity Tolerance fair   Equipment Currently Used at Home cane, straight;wheelchair, manual   Fall history within last six months yes   Number of times patient has fallen within last six months 2   Activity/Exercise/Self-Care Comment Independent in most ADLs at baseline. Has assist for bathing and shower transfers, since the past 6 months. Typically uses the walk in shower located in the basement. Pt  typically uses a cane for mobility. Uses a wheelchair for distances outside of the home.   Instrumental Activities of Daily Living (IADL)   Previous Responsibilities medication management;finances;driving;shopping   IADL Comments Spouse and kids assist in IADLs, laundry, home management tasks. Pt does drive. Drive up services  for shopping.   General Information   Onset of Illness/Injury or Date of Surgery 01/27/24   Referring Physician Alma Christopher MD   Patient/Family Therapy Goal Statement (OT) Home   Additional Occupational Profile Info/Pertinent History of Current Problem PEr chart: Yodit Harvey is a 43 year old female with past medical history significant for hypothyroidism and morbid obesity admitted on 1/27/2024 with dizziness and falls, found to be in SVT. She is also found to have mild hypoxia and compensated hypercapnia. Respiratory failure with hypoxemia and hypercapnia likely due to OHS and JL. See chart for details.   General Observations and Info Decreased activity  tolerance, strength and IND in I/ADLs.   Cognitive Status Examination   Orientation Status orientation to person, place and time   Safety Deficit at risk behavior observed   Pain Assessment   Patient Currently in Pain Yes, see Vital Sign flowsheet  (Pt reports chronic back pain)   Strength Comprehensive (MMT)   Comment, General Manual Muscle Testing (MMT) Assessment Decreased BUE strength.   Transfers   Transfers bed-chair transfer;sit-stand transfer;toilet transfer   Transfer Skill: Bed to Chair/Chair to Bed   Bed-Chair [place] (Transfers) set up;verbal cues;contact guard   Sit-Stand Transfer   Sit-Stand [place] (Transfers) set up;verbal cues;contact guard   Toilet Transfer   Type (Toilet Transfer) sit-stand;stand-sit   [place] Level (Toilet Transfer) set up;verbal cues;contact guard  (BSC)   Clinical Impression   Criteria for Skilled Therapeutic Interventions Met (OT) Yes, treatment indicated   OT Diagnosis Decreased activity tolerance and IND in I/ADLs   Influenced by the following impairments Decreased activity tolerance and IND in I/ADLs   OT Problem List-Impairments impacting ADL problems related to;activity tolerance impaired;cognition;balance;pain;strength   Assessment of Occupational Performance 3-5 Performance Deficits   Identified Performance Deficits Decreased activity tolerance and IND in I/ADLs (Dressing, bathing, toileting, home management tasks)   Planned Therapy Interventions (OT) ADL retraining;IADL retraining;cognition;strengthening;transfer training;home program guidelines;progressive activity/exercise   Clinical Decision Making Complexity (OT) detailed assessment/moderate complexity   Risk & Benefits of therapy have been explained evaluation/treatment results reviewed;care plan/treatment goals reviewed;risks/benefits reviewed;patient   OT Total Evaluation Time   OT Eval, Low Complexity Minutes (81205) 7   OT Goals   Therapy Frequency (OT) Daily   OT Predicted Duration/Target Date  for Goal Attainment 02/08/24   OT Goals Hygiene/Grooming;Upper Body Dressing;Lower Body Dressing;Toilet Transfer/Toileting;Cognition;OT Goal 1;Upper Body Bathing;Lower Body Bathing   OT: Hygiene/Grooming modified independent;while standing   OT: Upper Body Dressing Modified independent;including set-up/clothing retrieval   OT: Lower Body Dressing Modified independent;including set-up/clothing retrieval;using adaptive equipment   OT: Upper Body Bathing Modified independent   OT: Lower Body Bathing Modified independent   OT: Toilet Transfer/Toileting Modified independent;toilet transfer;cleaning and garment management   OT: Cognitive Patient/caregiver will verbalize understanding of cognitive assessment results/recommendations as needed for safe discharge planning   OT: Goal 1 Pt will verbalize understanding of 2 shower transfer techniques, in order to increase IND in I/ADLs.   Interventions   Interventions Quick Adds Self-Care/Home Management   Self-Care/Home Management   Self-Care/Home Mgmt/ADL, Compensatory, Meal Prep Minutes (25077) 38   Symptoms Noted During/After Treatment (Meal Preparation/Planning Training) fatigue;increased pain   Treatment Detail/Skilled Intervention RN brought Laura londono into room. Educated on safe functional transfer to the BSC. Transfer to the BSC with CGA and set up. Pt reported that laura londono is uncomfortable behind her legs, extra time to problem solve transfer for the BSC/toilet. While standing at the sink with CGA, pt completed 1 hygiene task with CGA and set up. Pt demnstrated heavy relliance on the sink counter and placed forearms on the counter. Pt reporting chronic pain in back. Pt requesting to return to bed. Significant extra time to position limbs with pillows. All needs in reach at the end of session.   OT Discharge Planning   OT Plan LE dressing and bring AE; laura BSC vs toilet transfer; G/H sinkside   OT Discharge Recommendation (DC Rec) home with assist;home with home  care occupational therapy   OT Rationale for DC Rec Pt lives in a house with her family and typically independent in some ADLs and IADLs. Pt limited by decreased activity tolerance and IND in I/ADLs. Home with assist in I/ADLs (shower transfers, home management tasks, shopping) and home OT to address safety and IND in I/ADLs.   OT Brief overview of current status CGA BSC transfer   OT Equipment Needed at Discharge shower chair;toileting equipment   Total Session Time   Timed Code Treatment Minutes 38   Total Session Time (sum of timed and untimed services) 45

## 2024-02-02 NOTE — CONSULTS
Initial Psychiatric Consult   Consult date: February 2, 2024         Reason for Consult, requesting source:      Pression at patient request    Requesting source: Andrea Junior    Labs and imaging reviewed. Patient seen and evaluated by Dee Jain MD          HPI:     This is a 43-year old female with a past medical history significant for hypothyroidism who was admitted on 1/27/2024 in the setting of dizziness and falls.  She was found to be in SVT.  Patient was also found to have mild hypoxia and compensated hypercapnia.  She recently had COVID-19 infection in December 2023.  She also had encephalopathy which apparently has improved.  Patient was initially in the ICU, but was transferred to [facility] on 1/31/2024.  Patient was given adenosine x 2 and return to normal sinus rhythm.  Patient had elevated troponins peaking at 170 thought to be related to demand.  Cardiology has not been involved.  Patient also presented with SUELLEN with a creatinine of 1.47 on admission.  Notably, the patient has a history of uncontrolled hypothyroidism with prior to admission levothyroxine dose was supposed to be to 50 mcg daily, but patient had not been taking for years.  TSH was 144 with a T4 of 0.18 at admission.    A very pleasant conversation with this lady today.  She does not have any history of depression or anxiety.  She reports a significant weight gain that really took cold during COVID.  It has been difficult for her to go to the doctor because she feels judged.  She has a Mirena IUD that needs to be removed, and may have been placed as long as 5 years ago.    Patient and I discussed the impact of significant hypothyroidism on mood and overall function.  She is an intelligent person, who knew much of this information anyway.        Past Psychiatric History:   I do not see any prior psychiatric notes.  Patient denies any prior psychiatric history.        Substance Use and History:   None        Past Medical  History:   PAST MEDICAL HISTORY:   Past Medical History:   Diagnosis Date    Family history of ovarian cancer 3/3/2016    Genital herpes 05/15/2019    first outbreak.  with known cold sores for years and PCR pos for type I and antibody just barely positive at first outbreak with known exposure about 3-4 weeks prior to her outbreak    Hypothyroidism, unspecified hypothyroidism type [E03.9] 3/3/2016    Menorrhagia with regular cycle 3/3/2016    Morbid obesity, unspecified obesity type (H) 3/3/2016    Presence of intrauterine contraceptive device (IUD) 2019    mirena placed by [facility]/S confirmation at [facility] immediately after with correct placement       PAST SURGICAL HISTORY:   Past Surgical History:   Procedure Laterality Date     SECTION  12/10/08, 11    HC TOOTH EXTRACTION W/FORCEP      IR PICC PLACEMENT > 5 YRS OF AGE  2024    ZZHC CREATE EARDRUM OPENING,GEN ANESTH               Family History:   FAMILY HISTORY:   Family History   Problem Relation Age of Onset    Breast Cancer Maternal Grandmother         2013    Uterine Cancer Mother 48         age 48    Thyroid Disease Sister        Family Psychiatric History:         Social History:   SOCIAL HISTORY:   Social History     Tobacco Use    Smoking status: Never    Smokeless tobacco: Never   Substance Use Topics    Alcohol use: Yes     Alcohol/week: 0.0 standard drinks of alcohol            Physical ROS:   The 10 point Review of Systems is negative other than noted in the HPI or here.           Medications:      enoxaparin ANTICOAGULANT  40 mg Subcutaneous Q12H    folic acid  1 mg Oral Daily    levothyroxine  250 mcg Oral QAM AC    miconazole   Topical BID    sodium chloride (PF)  10 mL Intracatheter Q8H    sodium chloride (PF)  10-40 mL Intracatheter Q7 Days    sodium chloride (PF)  3 mL Intracatheter Q8H              Allergies:   No Known Allergies       Labs and Imaging:     Recent Results (from the past 48 hour(s))    Basic metabolic panel    Collection Time: 01/31/24  3:36 PM   Result Value Ref Range    Sodium 141 135 - 145 mmol/L    Potassium 3.4 3.4 - 5.3 mmol/L    Chloride 102 98 - 107 mmol/L    Carbon Dioxide (CO2) 32 (H) 22 - 29 mmol/L    Anion Gap 7 7 - 15 mmol/L    Urea Nitrogen 19.8 6.0 - 20.0 mg/dL    Creatinine 1.07 (H) 0.51 - 0.95 mg/dL    GFR Estimate 65 >60 mL/min/1.73m2    Calcium 8.7 8.6 - 10.0 mg/dL    Glucose 226 (H) 70 - 99 mg/dL   CBC with platelets    Collection Time: 01/31/24  3:36 PM   Result Value Ref Range    WBC Count 4.6 4.0 - 11.0 10e3/uL    RBC Count 2.74 (L) 3.80 - 5.20 10e6/uL    Hemoglobin 8.5 (L) 11.7 - 15.7 g/dL    Hematocrit 27.8 (L) 35.0 - 47.0 %     (H) 78 - 100 fL    MCH 31.0 26.5 - 33.0 pg    MCHC 30.6 (L) 31.5 - 36.5 g/dL    RDW 16.1 (H) 10.0 - 15.0 %    Platelet Count 145 (L) 150 - 450 10e3/uL   Blood gas venous    Collection Time: 01/31/24  3:36 PM   Result Value Ref Range    pH Venous 7.38 7.32 - 7.43    pCO2 Venous 56 (H) 40 - 50 mm Hg    pO2 Venous 34 25 - 47 mm Hg    Bicarbonate Venous 33 (H) 21 - 28 mmol/L    Base Excess/Deficit Venous 7.0 (H) -3.0 - 3.0 mmol/L    FIO2 35     Oxyhemoglobin Venous 61 (L) 70 - 75 %    O2 Sat, Venous 62.4 (L) 70.0 - 75.0 %   Folate    Collection Time: 01/31/24  3:53 PM   Result Value Ref Range    Folic Acid 11.4 4.6 - 34.8 ng/mL   Glucose by meter    Collection Time: 01/31/24  8:04 PM   Result Value Ref Range    GLUCOSE BY METER POCT 82 70 - 99 mg/dL   Glucose by meter    Collection Time: 02/01/24  1:34 AM   Result Value Ref Range    GLUCOSE BY METER POCT 87 70 - 99 mg/dL   Platelet count    Collection Time: 02/01/24  4:25 AM   Result Value Ref Range    Platelet Count 147 (L) 150 - 450 10e3/uL   Potassium    Collection Time: 02/01/24  4:25 AM   Result Value Ref Range    Potassium 3.3 (L) 3.4 - 5.3 mmol/L   Blood gas venous    Collection Time: 02/01/24  4:25 AM   Result Value Ref Range    pH Venous 7.41 7.32 - 7.43    pCO2 Venous 49 40 - 50 mm  Hg    pO2 Venous 53 (H) 25 - 47 mm Hg    Bicarbonate Venous 32 (H) 21 - 28 mmol/L    Base Excess/Deficit Venous 6.1 (H) -3.0 - 3.0 mmol/L    FIO2 45     Oxyhemoglobin Venous 88 (H) 70 - 75 %    O2 Sat, Venous 89.0 (H) 70.0 - 75.0 %   Comprehensive metabolic panel    Collection Time: 02/01/24  4:25 AM   Result Value Ref Range    Sodium 140 135 - 145 mmol/L    Potassium 3.4 3.4 - 5.3 mmol/L    Carbon Dioxide (CO2) 30 (H) 22 - 29 mmol/L    Anion Gap 10 7 - 15 mmol/L    Urea Nitrogen 19.5 6.0 - 20.0 mg/dL    Creatinine 1.05 (H) 0.51 - 0.95 mg/dL    GFR Estimate 66 >60 mL/min/1.73m2    Calcium 9.0 8.6 - 10.0 mg/dL    Chloride 100 98 - 107 mmol/L    Glucose 89 70 - 99 mg/dL    Alkaline Phosphatase 42 40 - 150 U/L    AST 33 0 - 45 U/L    ALT 19 0 - 50 U/L    Protein Total 6.8 6.4 - 8.3 g/dL    Albumin 3.8 3.5 - 5.2 g/dL    Bilirubin Total 0.4 <=1.2 mg/dL   CBC with platelets    Collection Time: 02/01/24  4:25 AM   Result Value Ref Range    WBC Count 4.4 4.0 - 11.0 10e3/uL    RBC Count 2.73 (L) 3.80 - 5.20 10e6/uL    Hemoglobin 8.5 (L) 11.7 - 15.7 g/dL    Hematocrit 27.5 (L) 35.0 - 47.0 %     (H) 78 - 100 fL    MCH 31.1 26.5 - 33.0 pg    MCHC 30.9 (L) 31.5 - 36.5 g/dL    RDW 16.0 (H) 10.0 - 15.0 %    Platelet Count 147 (L) 150 - 450 10e3/uL   Blood gas venous    Collection Time: 02/01/24 11:17 AM   Result Value Ref Range    pH Venous 7.37 7.32 - 7.43    pCO2 Venous 59 (H) 40 - 50 mm Hg    pO2 Venous 23 (L) 25 - 47 mm Hg    Bicarbonate Venous 34 (H) 21 - 28 mmol/L    Base Excess/Deficit Venous 6.8 (H) -3.0 - 3.0 mmol/L    FIO2 2     Oxyhemoglobin Venous 31 (L) 70 - 75 %    O2 Sat, Venous 31.2 (L) 70.0 - 75.0 %   Potassium    Collection Time: 02/01/24 11:17 AM   Result Value Ref Range    Potassium 3.8 3.4 - 5.3 mmol/L   CBC with platelets and differential    Collection Time: 02/01/24 11:17 AM   Result Value Ref Range    WBC Count 5.6 4.0 - 11.0 10e3/uL    RBC Count 3.11 (L) 3.80 - 5.20 10e6/uL    Hemoglobin 9.8 (L)  11.7 - 15.7 g/dL    Hematocrit 31.8 (L) 35.0 - 47.0 %     (H) 78 - 100 fL    MCH 31.5 26.5 - 33.0 pg    MCHC 30.8 (L) 31.5 - 36.5 g/dL    RDW 16.1 (H) 10.0 - 15.0 %    Platelet Count 164 150 - 450 10e3/uL    % Neutrophils      % Lymphocytes      % Monocytes      % Eosinophils      % Basophils      % Immature Granulocytes      NRBCs per 100 WBC 0 <1 /100    Absolute Neutrophils      Absolute Lymphocytes      Absolute Monocytes      Absolute Eosinophils      Absolute Basophils      Absolute Immature Granulocytes      Absolute NRBCs 0.0 10e3/uL   Reticulocyte count    Collection Time: 02/01/24 11:17 AM   Result Value Ref Range    % Reticulocyte 2.1 (H) 0.5 - 2.0 %    Absolute Reticulocyte 0.064 0.025 - 0.095 10e6/uL   Manual Differential    Collection Time: 02/01/24 11:17 AM   Result Value Ref Range    % Neutrophils 66 %    % Lymphocytes 20 %    % Monocytes 3 %    % Eosinophils 7 %    % Basophils 1 %    % Metamyelocytes 2 %    % Myelocytes 1 %    Absolute Neutrophils 3.7 1.6 - 8.3 10e3/uL    Absolute Lymphocytes 1.1 0.8 - 5.3 10e3/uL    Absolute Monocytes 0.2 0.0 - 1.3 10e3/uL    Absolute Eosinophils 0.4 0.0 - 0.7 10e3/uL    Absolute Basophils 0.1 0.0 - 0.2 10e3/uL    Absolute Metamyelocytes 0.1 (H) <=0.0 10e3/uL    Absolute Myelocytes 0.1 (H) <=0.0 10e3/uL    RBC Morphology Confirmed RBC Indices     Platelet Assessment  Automated Count Confirmed. Platelet morphology is normal.     Automated Count Confirmed. Platelet morphology is normal.    Smudge Cells Present (A) None Seen   Glucose by meter    Collection Time: 02/01/24  2:01 PM   Result Value Ref Range    GLUCOSE BY METER POCT 89 70 - 99 mg/dL   Blood gas venous    Collection Time: 02/01/24 10:48 PM   Result Value Ref Range    pH Venous 7.35 7.32 - 7.43    pCO2 Venous 58 (H) 40 - 50 mm Hg    pO2 Venous 41 25 - 47 mm Hg    Bicarbonate Venous 32 (H) 21 - 28 mmol/L    Base Excess/Deficit Venous 5.4 (H) -3.0 - 3.0 mmol/L    FIO2 100     Oxyhemoglobin Venous 70  "70 - 75 %    O2 Sat, Venous 71.4 70.0 - 75.0 %   Basic metabolic panel    Collection Time: 02/02/24  5:51 AM   Result Value Ref Range    Sodium 139 135 - 145 mmol/L    Potassium 3.7 3.4 - 5.3 mmol/L    Chloride 101 98 - 107 mmol/L    Carbon Dioxide (CO2) 31 (H) 22 - 29 mmol/L    Anion Gap 7 7 - 15 mmol/L    Urea Nitrogen 21.8 (H) 6.0 - 20.0 mg/dL    Creatinine 1.05 (H) 0.51 - 0.95 mg/dL    GFR Estimate 66 >60 mL/min/1.73m2    Calcium 9.3 8.6 - 10.0 mg/dL    Glucose 108 (H) 70 - 99 mg/dL   CBC with platelets    Collection Time: 02/02/24  5:51 AM   Result Value Ref Range    WBC Count 4.5 4.0 - 11.0 10e3/uL    RBC Count 2.80 (L) 3.80 - 5.20 10e6/uL    Hemoglobin 8.7 (L) 11.7 - 15.7 g/dL    Hematocrit 28.4 (L) 35.0 - 47.0 %     (H) 78 - 100 fL    MCH 31.1 26.5 - 33.0 pg    MCHC 30.6 (L) 31.5 - 36.5 g/dL    RDW 15.9 (H) 10.0 - 15.0 %    Platelet Count 149 (L) 150 - 450 10e3/uL   Blood gas arterial    Collection Time: 02/02/24 10:55 AM   Result Value Ref Range    pH Arterial 7.43 7.35 - 7.45    pCO2 Arterial 43 35 - 45 mm Hg    pO2 Arterial 76 (L) 80 - 105 mm Hg    FIO2 21     Bicarbonate Arterial 28 21 - 28 mmol/L    Base Excess/Deficit Arterial 3.5 (H) -3.0 - 3.0 mmol/L    Jim's Test Yes     Oxyhemoglobin Arterial 96 92 - 100 %    O2 Sat, Arterial 97.3 (H) 96.0 - 97.0 %          Physical and Psychiatric Examination:     /61 (BP Location: Left arm)   Pulse 51   Temp 97.1  F (36.2  C) (Axillary)   Resp 20   Ht 1.626 m (5' 4\")   Wt (!) 209.9 kg (462 lb 11.2 oz)   SpO2 99%   BMI 79.42 kg/m    Weight is 462 lbs 11.2 oz  Body mass index is 79.42 kg/m .    Physical Exam:  I have reviewed the physical exam as documented by by the medical team and agree with findings and assessment and have no additional findings to add at this time.    Mental Status Exam:    Appearance: awake, alert, adequately groomed, and dressed in hospital scrubs  Attitude:  cooperative  Eye Contact:  good  Mood:  better  Affect:  " appropriate and in normal range  Speech:  clear, coherent  Language: Fluent in english   Psychomotor Behavior:  no evidence of tardive dyskinesia, dystonia, or tics  Thought Process:  logical, linear, and goal oriented  Associations:  no loose associations  Thought Content:  no evidence of suicidal ideation or homicidal ideation and no evidence of psychotic thought  Insight:  good  Judgement:  intact  Oriented to:  time, person, and place  Attention Span and Concentration:  intact  Recent and Remote Memory:  intact  Fund of Knowledge: Appropriate   Gait and Station:                DSM-5 Diagnosis:     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  Patient does not have any psychiatric history, and has really just been feeling terribly since her thyroid has been inadequately replaced.  She feels judged when she goes to the doctor, and avoids it.  She is aware that this is not in her best interest.  She is also very aware of hypothyroid symptoms.    I do not recommend starting any antidepressants for this patient.  Do not think she needs therapy either.  I agree with Dr. Christopher's assessment that the patient deserves a referral to endocrinology and/or comprehensive weight management clinic.          Summary of Recommendations:   1.  I recommend any intervention for patient's mood issues beyond thyroid replacement.    2.  Patient is okay to discharge from psychiatric standpoint when medically clear.  I will sign off.      Dee Jain MD  Consult/Liaison Psychiatry and Addiction Medicine  [facility]

## 2024-02-02 NOTE — PROGRESS NOTES
Jim's test performed prior to radial ABG draw. Collateral circulation confirmed. Sample obtained from left radial artery. Sent to lab.

## 2024-02-02 NOTE — PROGRESS NOTES
[facility]     Reviewed PFT and ABG. Patient dose NOT qualify for a BIPAP device. CO2 on ABG was 43 and is not high enough for insurance to give patient BIPAP device. I called [place] care coordinator and LVM. I recommend outpatient sleep study.     Darlyn Bell  Respiratory Therapist   [facility]  [employer]  [address]  [address]  [place] [address]  [email or web address]  Office: [phone] Fax: [phone]  Employed by [facility] [employer]

## 2024-02-02 NOTE — PROGRESS NOTES
Bedside PFT was performed. Results in medical record under chart review and media tab. MD notified.     Carolin Gonzáles RRT

## 2024-02-02 NOTE — CONSULTS
Care Management Initial Consult    General Information  Assessment completed with: Patient,    Type of CM/SW Visit: Initial Assessment    Primary Care Provider verified and updated as needed:  (needs one)   Readmission within the last 30 days: no previous admission in last 30 days      Reason for Consult: discharge planning  Advance Care Planning: Advance Care Planning Reviewed: no concerns identified          Communication Assessment  Patient's communication style: spoken language (English or Bilingual)    Hearing Difficulty or Deaf: no   Wear Glasses or Blind: yes    Cognitive  Cognitive/Neuro/Behavioral: WDL  Level of Consciousness: alert  Arousal Level: opens eyes spontaneously  Orientation: oriented x 4  Mood/Behavior: cooperative, calm  Best Language: 0 - No aphasia  Speech: logical, clear    Living Environment:   People in home: child(pam), dependent, spouse     Current living Arrangements: house      Able to return to prior arrangements: yes       Family/Social Support:  Care provided by: self, spouse/significant other  Provides care for: child(pam)  Marital Status:   , Other (specify) (friends)          Description of Support System: Supportive, Involved         Current Resources:   Patient receiving home care services: No     Community Resources: None  Equipment currently used at home: cane, straight, wheelchair, manual  Supplies currently used at home: None    Employment/Financial:  Employment Status: unemployed        Financial Concerns: none   Referral to Financial Worker: No       Does the patient's insurance plan have a 3 day qualifying hospital stay waiver?  No    Lifestyle & Psychosocial Needs:  Social Determinants of Health     Food Insecurity: Not on file   Depression: Not on file   Housing Stability: Not on file   Tobacco Use: Low Risk  (1/30/2024)    Patient History     Smoking Tobacco Use: Never     Smokeless Tobacco Use: Never     Passive Exposure: Not on file   Financial  Resource Strain: Not on file   Alcohol Use: Not on file   Transportation Needs: Not on file   Physical Activity: Not on file   Interpersonal Safety: Not on file   Stress: Not on file   Social Connections: Not on file       Functional Status:  Prior to admission patient needed assistance:   Dependent ADLs:: Ambulation-cane  Dependent IADLs:: Cleaning, Cooking, Laundry, Shopping, Meal Preparation, Transportation       Mental Health Status:          Chemical Dependency Status:                Values/Beliefs:  Spiritual, Cultural Beliefs, Mormonism Practices, Values that affect care: no               Additional Information:  Writer met with patient and introduced self and role.  Patient states that she lives in a house with 3 stairs to enter.  All needs met on main level except for shower and laundry in basement with full flight of stairs with railing.  Patient states that over the past few months she has been going to her in laws to shower since theirs is a walk in and she does not feel safe going down her stairs at home.  Prior to admit patient was using a straight cane for ambulation and if doing longer distances outside of the home she used a manual wheelchair.  Patient lives with her  and 2 children, ages 12 and 15.  Per patient, she has not been able to do household chores or cooking over the past several months due to increasing fatigue.     Writer reviewed recommendation for homecare and what that entails, as well as TCU.  Patient stated she does not want to go to TCU, but is agreeable to homecare services.  Referral made for homecare and 1 agency is currently reviewing; [facility], [phone].  Writer awaiting call back from Elodia with [facility] regarding if they will accept patient.     Yamileth Lazo RN Care Coordinator  [facility]  [phone]

## 2024-02-02 NOTE — PROGRESS NOTES
[facility]    Pulmonary, Allergy, Critical Care and Sleep Medicine    Pulmonary Consult Progress Note    Yodit Harvey MRN# [id number]   Age: 43 year old YOB: 1980     Date of Admission: 1/27/2024  Date of Service: 02/02/2024     ==================================================  INTERVAL EVENTS:    -She feels much better today, awake and alert.  -ABG this morning without hypercarbia, though this was drawn shortly after coming off BiPAP so likely does not represent her baseline      CHANGES FOR TODAY:  -ABG this evening and again early in the morning  -Overnight oxymetry    ==================================================    ASSESSMENT AND RECOMMENDATIONS:    Probable obesity hypoventilation syndrome with suspected obstructive sleep apnea  Acute on chronic hypoxic respiratory failure with hypercapnia  Morbid obesity       Initially presented for dizziness and falls, found to be in SVT, but also hypoxic and hypercarbic.  Overnight oximetry with 42 minutes below 88% despite supplemental oxygen.  Venous gases 1/29 with pH between 7.17 and 7.21 with minimal improvement on BiPAP so she was transferred to the ICU for closer monitoring.    At this point she is recovered enough to assess for home BiPAP.  PFTs 2/2 with uniform decrease in FVC and FEV1 but normal FEV1/FVC ratio.  The diminished FVC and FEV1 are likely due to obesity, there is no obstruction.  Awake ABG collected 2/2.    She should remain off BiPAP today and tonight with ABG tonight and in the early morning.  Should she experience respiratory difficulty overnight and need to be put on BiPAP, ABG should be collected first.  This was discussed with RT and the bedside nurse.    -Continue to target oxygen saturations between 88 and 92%  -She will need full sleep medicine assessment after discharge        Aneudy Hinkle M.D.  Pulmonary & Critical Care  Pager: Click Here to page    I spent 50 minutes dedicated to  "this care so far today excluding procedures, including review of medical records, review of imaging (results & images), time with patient and time in documentation.        ==================================================      PHYSICAL EXAM  BP 90/65 (BP Location: Left arm)   Pulse 53   Temp 97.1  F (36.2  C) (Axillary)   Resp 20   Ht 1.626 m (5' 4\")   Wt (!) 212.3 kg (468 lb 1.6 oz)   SpO2 100%   BMI 80.35 kg/m        Intake/Output Summary (Last 24 hours) at 1/30/2024 0931  Last data filed at 1/30/2024 0600  Gross per 24 hour   Intake 567.5 ml   Output 810 ml   Net -242.5 ml       Vitals:    01/27/24 0458 01/28/24 0629 02/01/24 0925   Weight: (!) 224 kg (493 lb 13.3 oz) (!) 224 kg (493 lb 13.3 oz) (!) 212.3 kg (468 lb 1.6 oz)         General: Alert, interactive, NAD  Resp: CTAB, no crackles or wheezes  Cardiac: Tachy, regular, NS1,S2, No m/r/g  Extremities: No LE edema or obvious joint abnormalities  Skin: Warm and dry, no jaundice or rash  Neuro: Alert & oriented x 3, Cns 2-12 intact, moves all extremities equally      Recent Labs   Lab Test 02/02/24  0551 02/01/24  1117 02/01/24  0425   WBC 4.5 5.6 4.4   RBC 2.80* 3.11* 2.73*   HGB 8.7* 9.8* 8.5*   * 164 147*  147*       Recent Labs   Lab Test 02/02/24  0551 02/01/24  1401 02/01/24  1117 02/01/24  0425 01/31/24  2004 01/31/24  1536 01/28/24  0542 01/27/24  0450     --   --  140  --  141   < > 139   POTASSIUM 3.7  --  3.8 3.4  3.3*  --  3.4   < > 4.2   CHLORIDE 101  --   --  100  --  102   < > 98   CO2 31*  --   --  30*  --  32*   < > 27   BUN 21.8*  --   --  19.5  --  19.8   < > 16.1   CR 1.05*  --   --  1.05*  --  1.07*   < > 1.47*   * 89  --  89   < > 226*   < > 158*   BECKY 9.3  --   --  9.0  --  8.7   < > 9.3   MAG  --   --   --   --   --   --   --  2.0    < > = values in this interval not displayed.           No results for input(s): \"CULT\" in the last 168 hours.      Recent Results (from the past 48 hour(s))   XR Chest Port 1 " View    Narrative    CHEST ONE VIEW  1/31/2024 1:55 PM     HISTORY: Verify PICC .    COMPARISON: Chest radiograph 1/27/2024. Fluoroscopic image 1/30/2024.      Impression    IMPRESSION: Patient is rotated.     PICC tip projects in the region of the brachiocephalic  confluence/upper SVC. This could be advanced by approximately 8-9 cm.  This appears retracted from the recent fluoroscopic image given  differences in technique.     Left midlung atelectasis. No focal consolidation, pleural effusion or  pneumothorax. Cardiomediastinal silhouette is unremarkable.    VLADIMIR SHETH MD         SYSTEM ID:  [id number]     I spent 50 minutes dedicated to this care so far today excluding procedures, including review of medical records, review of imaging (results & images), time with patient and time in documentation.

## 2024-02-02 NOTE — PLAN OF CARE
Goal Outcome Evaluation:       [id number]    A&O, pleasant & able to make needs known. Pt denied pain. VSS, on 2L NC during evening, BIPAP placed overnight by RT. Has a blister on bridge of nose, placed skin barrier & informed RT to fit accordingly. Up w/SBA+ Walker. Gonzales in place & intact. Tele: SR. Alarms on.  Discharge pending; will need BIPAP at discharge. Continue with plan of care.

## 2024-02-02 NOTE — PROGRESS NOTES
CLINICAL NUTRITION SERVICES  -  ASSESSMENT NOTE    Recommendations Ordered by Registered Dietitian (RD): Encouraged po intake, emphasizing the importance of protein to preserve lean muscle mass   Malnutrition: 2/2  % Weight Loss:  None noted  % Intake:  Decreased intake does not meet criteria for malnutrition  Subcutaneous Fat Loss:  None observed  Muscle Loss:  None observed  Fluid Retention:  Mild 2+    Malnutrition Diagnosis: Patient does not meet two of the above criteria necessary for diagnosing malnutrition     REASON FOR ASSESSMENT  Yodit Harvey is a 43 year old female seen by Registered Dietitian for [facility] of: hypothyroidism and morbid obesity admitted on 1/27/2024 with dizziness and falls, found to be in SVT. She is also found to have mild hypoxia and compensated hypercapnia.     NUTRITION HISTORY  - Information obtained from chart review and pt at bedside, who was very pleasant and sweet  - Patient on a regular diet at home.  - Chewing/swallowing issues or other Barriers to PO intakes: None  - Typical food/fluid intake is 1 meals/day consisting of something like Hotdish or Roast chicken dinner. Pt doesn't like seafood and likes burgers but otherwise doesn't like red meat, especially if bloody/undercooked.   - Use of oral supplements: None and politely declined any while admitted    CURRENT NUTRITION ORDERS  Diet Order:   Regular     Current Intake/Tolerance: When asked about intakes <75%, Yodit just said she she got full quickly and noted being NPO the past couple days may have played a role in that early satiety. Ultimately Delores reported no issues with po intake and was confident she could eat adequately if/when not NPO status.   - Flowsheets show a fair appetite and intakes x2 of 25-75% yesterday.   - Pt had several days NPO but orders adequately when not.     GI: No BM recorded yet this admission     NUTRITION FOCUSED PHYSICAL ASSESSMENT FOR DIAGNOSING MALNUTRITION)  Yes    Observed:   "  Fluid retention (refer to documentation in Malnutrition section)    Obtained from Chart/Interdisciplinary Team:  Edema Mild 2+    ANTHROPOMETRICS  Height: 5' 4\"  Weight: 468 lbs 1.6 oz (212.3 kg)  Body mass index is 80.35 kg/m .  Weight Status:  Obesity Grade III BMI >40  IBW: 120 lb (54.7 kg)  % IBW: >200%  Weight History:   Wt Readings from Last 10 Encounters:   02/01/24 (!) 212.3 kg (468 lb 1.6 oz)   06/14/19 (!) 150.4 kg (331 lb 9.6 oz)   05/15/19 149.1 kg (328 lb 12.8 oz)   04/10/19 (!) 153.4 kg (338 lb 3.2 oz)   03/04/19 (!) 157.4 kg (347 lb)   02/18/19 (!) 159.7 kg (352 lb)   02/04/19 (!) 161.9 kg (357 lb)   03/02/16 (!) 166.9 kg (368 lb)   11/27/13 (!) 161.5 kg (356 lb)      LABS  Labs reviewed    MEDICATIONS  Medications reviewed - folic acid    ASSESSED NUTRITION NEEDS PER APPROVED PRACTICE GUIDELINES:  Dosing Weight 94 kg (adjusted)  Estimated Energy Needs: 8767-8774 kcals (20-25 Kcal/Kg)  Justification: obese  Estimated Protein Needs:  grams protein (1-1.2 g pro/Kg)  Justification: maintenance  Estimated Fluid Needs: 9482-2263  mL (1 mL/Kcal)  Justification: maintenance    MALNUTRITION: as above    NUTRITION DIAGNOSIS:  Predicted suboptimal nutrient intake related to variable NPO status during admission.     NUTRITION INTERVENTIONS  Recommendations / Nutrition Prescription  Encouraged po intake, emphasizing the importance of protein to preserve lean muscle mass    Implementation  General/healthful diet    Nutrition Goals  PO intake - Meet estimated needs    MONITORING AND EVALUATION:  Progress towards goals will be monitored and evaluated per protocol and Practice Guidelines  "

## 2024-02-02 NOTE — PROGRESS NOTES
Writer spoke with RT Darlyn with [facility] regarding testing needed for home Bipap and Darlyn confirmed from previous RT's recommendations that the following need to be done:    In order to qualify patient for a BIPAP S (-QGYLQ without rate) patient must have a diagnosis of obesity hypoventilation syndrome and the following criteria must be met:      Patient must have a ABG with a PaCO2 ? 45%.   Bedside spirometry showing a FEV1/FVC ? 70% of predicted  And, do not wear bipap at night ABG drawn during sleep or immediately upon awakening while on prescribed FiO2 that shows PaCO2 worsened ? 7mmHg compared to baseline draw     Patient will be eligible for a BIPAP S (without rate/) only if criteria from above is met.     If patient needs to have a set respiratory rate, then further testing will need to be done.    Darlyn with  [facility] can be contacted today only at: [phone].    Yamileth Lazo RN Care Coordinator  [facility]  [phone]

## 2024-02-03 LAB
ALLEN'S TEST: YES
ALLEN'S TEST: YES
BASE EXCESS BLDA CALC-SCNC: 2.7 MMOL/L (ref -3–3)
BASE EXCESS BLDA CALC-SCNC: 4.1 MMOL/L (ref -3–3)
COHGB MFR BLD: 97.9 % (ref 96–97)
COHGB MFR BLD: 99.1 % (ref 96–97)
ERYTHROCYTE [DISTWIDTH] IN BLOOD BY AUTOMATED COUNT: 16 % (ref 10–15)
HCO3 BLD-SCNC: 30 MMOL/L (ref 21–28)
HCO3 BLD-SCNC: 30 MMOL/L (ref 21–28)
HCT VFR BLD AUTO: 31.3 % (ref 35–47)
HGB BLD-MCNC: 9.7 G/DL (ref 11.7–15.7)
MCH RBC QN AUTO: 31.1 PG (ref 26.5–33)
MCHC RBC AUTO-ENTMCNC: 31 G/DL (ref 31.5–36.5)
MCV RBC AUTO: 100 FL (ref 78–100)
O2/TOTAL GAS SETTING VFR VENT: 28 %
O2/TOTAL GAS SETTING VFR VENT: 32 %
PCO2 BLD: 54 MM HG (ref 35–45)
PCO2 BLD: 59 MM HG (ref 35–45)
PEEP: 0 CM H2O
PH BLD: 7.31 [PH] (ref 7.35–7.45)
PH BLD: 7.36 [PH] (ref 7.35–7.45)
PLATELET # BLD AUTO: 161 10E3/UL (ref 150–450)
PO2 BLD: 114 MM HG (ref 80–105)
PO2 BLD: 94 MM HG (ref 80–105)
POTASSIUM SERPL-SCNC: 4 MMOL/L (ref 3.4–5.3)
RBC # BLD AUTO: 3.12 10E6/UL (ref 3.8–5.2)
SAO2 % BLDA: 96 % (ref 92–100)
SAO2 % BLDA: 98 % (ref 92–100)
WBC # BLD AUTO: 5.4 10E3/UL (ref 4–11)

## 2024-02-03 PROCEDURE — 99232 SBSQ HOSP IP/OBS MODERATE 35: CPT | Performed by: HOSPITALIST

## 2024-02-03 PROCEDURE — 250N000011 HC RX IP 250 OP 636: Performed by: HOSPITALIST

## 2024-02-03 PROCEDURE — 82805 BLOOD GASES W/O2 SATURATION: CPT | Performed by: INTERNAL MEDICINE

## 2024-02-03 PROCEDURE — 82805 BLOOD GASES W/O2 SATURATION: CPT | Performed by: HOSPITALIST

## 2024-02-03 PROCEDURE — 94660 CPAP INITIATION&MGMT: CPT

## 2024-02-03 PROCEDURE — 84132 ASSAY OF SERUM POTASSIUM: CPT | Performed by: HOSPITALIST

## 2024-02-03 PROCEDURE — 210N000002 HC R&B HEART CARE

## 2024-02-03 PROCEDURE — 36600 WITHDRAWAL OF ARTERIAL BLOOD: CPT

## 2024-02-03 PROCEDURE — 250N000013 HC RX MED GY IP 250 OP 250 PS 637: Performed by: HOSPITALIST

## 2024-02-03 PROCEDURE — 85027 COMPLETE CBC AUTOMATED: CPT | Performed by: HOSPITALIST

## 2024-02-03 PROCEDURE — 94762 N-INVAS EAR/PLS OXIMTRY CONT: CPT

## 2024-02-03 PROCEDURE — 999N000157 HC STATISTIC RCP TIME EA 10 MIN

## 2024-02-03 RX ADMIN — SENNOSIDES AND DOCUSATE SODIUM 1 TABLET: 50; 8.6 TABLET ORAL at 21:12

## 2024-02-03 RX ADMIN — MICONAZOLE NITRATE: 2 POWDER TOPICAL at 21:12

## 2024-02-03 RX ADMIN — ONDANSETRON 4 MG: 2 INJECTION INTRAMUSCULAR; INTRAVENOUS at 09:54

## 2024-02-03 RX ADMIN — FOLIC ACID 1 MG: 1 TABLET ORAL at 09:56

## 2024-02-03 RX ADMIN — LEVOTHYROXINE SODIUM 250 MCG: 100 TABLET ORAL at 09:56

## 2024-02-03 RX ADMIN — ENOXAPARIN SODIUM 40 MG: 40 INJECTION SUBCUTANEOUS at 06:06

## 2024-02-03 RX ADMIN — ENOXAPARIN SODIUM 40 MG: 40 INJECTION SUBCUTANEOUS at 17:57

## 2024-02-03 RX ADMIN — MICONAZOLE NITRATE: 2 POWDER TOPICAL at 11:00

## 2024-02-03 RX ADMIN — ACETAMINOPHEN 650 MG: 325 TABLET, FILM COATED ORAL at 06:10

## 2024-02-03 ASSESSMENT — ACTIVITIES OF DAILY LIVING (ADL)
ADLS_ACUITY_SCORE: 29
ADLS_ACUITY_SCORE: 30
ADLS_ACUITY_SCORE: 29
ADLS_ACUITY_SCORE: 30
ADLS_ACUITY_SCORE: 30
ADLS_ACUITY_SCORE: 29
ADLS_ACUITY_SCORE: 29

## 2024-02-03 ASSESSMENT — ABNORMAL INVOLUNTARY MOVEMENT SCALE (AIMS)
AIMS_SEVERITY: NONE, NORMAL
FACIAL_EXPRESSION_MUSCLES: NONE, NORMAL
UPPER_BODY_EXTREMITIES: NONE, NORMAL

## 2024-02-03 NOTE — PROGRESS NOTES
Jim's test performed prior to radial ABG draw. Collateral circulation confirmed. Sample obtained from left radial artery. Sample sent to lab.   Last Arterial Blood Gas:  pH Arterial   Date Value Ref Range Status   02/03/2024 7.31 (L) 7.35 - 7.45 Final     pCO2 Arterial   Date Value Ref Range Status   02/03/2024 59 (H) 35 - 45 mm Hg Final     pO2 Arterial   Date Value Ref Range Status   02/03/2024 114 (H) 80 - 105 mm Hg Final     Bicarbonate Arterial   Date Value Ref Range Status   02/03/2024 30 (H) 21 - 28 mmol/L Final     O2 Sat, Arterial   Date Value Ref Range Status   02/03/2024 99.1 (H) 96.0 - 97.0 % Final     Base Excess/Deficit Arterial   Date Value Ref Range Status   02/03/2024 2.7 -3.0 - 3.0 mmol/L Final     Jennifer Flores, RT 11:25 AM 02/03/24

## 2024-02-03 NOTE — PROGRESS NOTES
EPIC chat message received from MD Christopher that 0347 ABG would not qualify her for BiPAP. Patient was not put on on BiPAP overnight. New ABG ordered, RT called to collect.

## 2024-02-03 NOTE — PROGRESS NOTES
A&O pleasant, cooperative. Constipation, senna x2 given, did have two hard brown stools with melanie red blood streaks on outside,  digitally removed. Up walker asst 1. Pulse oxymetry study overnight,  sats routinely dipping to 50's on RA, lowest observed 48%, increased to 3L NC per RT guidance. ABGs collected per RT guidance to determine eligibility for BiPAP. Q2 hour turns. Voiding, specimen pan missed x2. Midline, unit collect. Tired this morning, new ABG ordered but pt reports that they were unable to collect in either wrist; plan to readdress.

## 2024-02-03 NOTE — PLAN OF CARE
A&O x4. Pt denied pain. VSS, weaned to RA and was on RA since 0900. Up w/ SBA + walker. Tele: SR. Gonzales removed. Repeat ABG pending. Alarms on. Plan for NO BIPAP tonight w/ overnight oximetry study, if pt decompensates get ABG PRIOR to placing bipap. Continue to monitor.

## 2024-02-03 NOTE — PROGRESS NOTES
[facility]    Medicine Progress Note - Hospitalist Service    Date of Admission:  1/27/2024    Assessment & Plan     Yodit Harvey is a 43 year old female with past medical history significant for hypothyroidism and morbid obesity admitted on 1/27/2024 with dizziness and falls, found to be in SVT. She is also found to have mild hypoxia and compensated hypercapnia.       Respiratory failure with hypoxemia and hypercapnia likely due to OHS and JL  Respiratory acidosis  Hypercapnia- suspect chronic, likely OHS and JL   History of recent COVID-19 infection in December 2023  Encephalopathy likely due to hypercapnia-Resolved  -On admission patient endorsed some shortness of breath following conversion to NSR. O2 sats in the ED down to mid 80s at times. VBG showed elevated pCO2 to 62 but with a normal pH, so this is presumed chronic.   -CXR was unremarkable but limited due to body habitus.   - CTPE negative for pulmonary embolism. Mild dependent probable atelectasis vs less likely infiltrate.   -Echo on admission showed EF of 55 to 60%, LV diastolic function is indeterminate and mild RV volume overload  - she was being monitored on the cardiology floor and patient VBG on 1/29 were consistent with severe respiratory acidosis, hypercapnia and worsening encephalopathy and she was transferred to ICU where she was seen by the ICU attending and along with pulmonary and patient was continued with BiPAP with improvement in her blood gases with improvement in mentation and hypercapnia has improved  -Status one dose of Lasix in the ICU  -Elevated proBNP as per cardiology is due to obesity hypoventilation syndrome and use diuresis on as-needed basis depending on renal function  -Transferred back [facility] [place]2024 and has been on BiPAP during nighttime and naps  -Discussed with pulmonary, case management patient's nurse and she needs to meet criteria to qualify for home BiPAP.  Criteria are found in  the 's note and patient has met 2 of the 3 criteria until this morning  -Patient had blood gas done around 330 this morning and had another blood gas done this morning and she has been off BiPAP and it does show mild respiratory acidosis with hypercapnia  -Will put her on BiPAP during daytime and give her breaks and use BiPAP during nighttime-  -will discuss with RT and if they are able to put the BiPAP order/settings then we will need to reach out with pulmonary team to see if BiPAP can be ordered      Mild hypokalemia-resolved    SVT   Dizziness, falls likely 2/2 above   -Pt presented to the ED after two falls. The first fall she reports was a trip and fall, however when getting up she started feeling dizzy so sat down. When she got up she again started to feel dizzy and was assisted to the ground by her . She thinks though that she might have passed out briefly during the second fall. When EMS arrived her HR was 177.   - EKG in the ED showed SVT. She was given adenosine x2 and she eventually returned to a normal sinus rhythm.   -Patient evaluated by the cardiology team and initial differentials included heart failure with preserved ejection fraction versus high-output heart failure and they were not concerned about ischemia and they recommended to treat her underlying thyroid issues  -Patient was being monitored in the hospital and troponins peaked at 170 and they have trended down   -Echo on admission showed EF of 55 to 60%, LV diastolic function is indeterminate and mild RV volume overload  -Heart rate seems to be stable for now    Troponin elevation likely due to demand  -On admission presented without any chest pain and troponins peaked at around 170s and trended down   - TTE and Cardiology consult as noted above       acute kidney injury-resolved  -Creatinine on admission is 1.47. No prior creatinine on file.   -Renal function has been fluctuating and is on the higher side and is  1.05  -Will monitor daily and diuretics should be used cautiously     Uncontrolled hypothyroidism   Pt with known hx of hypothyroidism. PTA is on levothyroxine 250mcg daily, but has not been taking for years and prior to that was only intermittently taking  - TSH is 144 and T4 is 0.18   -Continue with PTA levothyroxine at 250 mcg    -Will recommend to follow-up with endocrinology     Mild chronic macrocytic anemia   -Hemoglobin on admission was 10.6 and in EMR last recorded value is in 2016 when it was 11.6  -B12, folate and iron studies are adequate  -I do not see any bleeding but I have noticed that she had a lot of oozing from the midline yesterday and also has been having multiple blood draws and will check FOBT  -I think the acute drop in the hospital can be due to multiple blood draws but continue to monitor and there is no evidence of any active bleeding  - peripheral smear sent and results are pending  --Hemoglobin has been relatively stable around 9.5    Hyperglycemia  -  HbA1c during hospitalization is 5.7    Depressed mood  -She denies any suicidal or homicidal ideation and did not wanted to talk to psychiatry team and they were consulted    Headache-resolved likely due to CO2 narcosis     Morbid obesity   Body mass index is 82.18 kg/m .  Pt reports she has gained over 200lbs since the start of COVID.   - Discussed briefly recommendations for weight loss   - Treating hypothyroidism should help   - Would also recommend referral to endocrinology or comprehensive weight management clinic.        Disposition  -Home with home care with RN, PT, OT and will not happen over the weekend and need to arrange BiPAP          Diet: Regular Diet Adult    DVT Prophylaxis: Pneumatic Compression Devices, subcu Lovenox  Gonzales Catheter: Not present  Lines: PRESENT           Cardiac Monitoring: ACTIVE order. Indication: Tachyarrhythmias, acute (48 hours)  Code Status: Full Code      Clinically Significant Risk Factors          "                # Severe Obesity: Estimated body mass index is 79.42 kg/m  as calculated from the following:    Height as of this encounter: 1.626 m (5' 4\").    Weight as of this encounter: 209.9 kg (462 lb 11.2 oz).      # Financial/Environmental Concerns: none         Disposition Plan      Expected Discharge Date: 02/05/2024      Destination: home with help/services            Alma Christopher MD  Hospitalist Service  [facility]  Securely message with [email or web address] (more info)  Text page via [employer] Paging/Directory   _    _____________________________________________________________________    Interval History     Seen this morning and reviewed the events of overnight and discussed with nursing staff and repeat ABG this morning.  Patient is less alert but is following commands.  Discussed with her  Anmol.  She did had a little hard stool this morning.  No nausea or vomiting.  She has been using 3 L of oxygen likely during nighttime.  Discussed the results of the ABG with the patient and the .  I also discussed with the patient's nurse few times    Physical Exam   Vital Signs: Temp: 97.6  F (36.4  C) Temp src: Oral BP: 120/74 Pulse: 60   Resp: 20 SpO2: 92 % O2 Device: Nasal cannula Oxygen Delivery: 3 LPM  Weight: 462 lbs 11.2 oz        General: aox3  Respiratory:ctla  Cardiovascular: Regular rate , S1 and S2 normal   Abdomen:   soft , non tender , non distended and bowel sound present and she is on nasal cannula  Neurologic: No facial droop  Musculoskeletal: Normal Range of motion over upper and lower extremities bilaterally   Psychiatric: cooperative      Medical Decision Making           Time spent in care of patient is 45 minutes and reviewed labs and discussed plan of care in detail with the patient, nursing staff,  and will reach out to the pulmonary team    Data     I have personally reviewed the following data over the past 24 hrs:    5.4  \   9.7 (L)   / 161     N/A N/A " N/A /  N/A   4.0 N/A N/A \       Imaging results reviewed over the past 24 hrs:   No results found for this or any previous visit (from the past 24 hour(s)).

## 2024-02-04 ENCOUNTER — DOCUMENTATION ONLY (OUTPATIENT)
Dept: SLEEP MEDICINE | Facility: CLINIC | Age: 44
End: 2024-02-04
Payer: COMMERCIAL

## 2024-02-04 ENCOUNTER — APPOINTMENT (OUTPATIENT)
Dept: PHYSICAL THERAPY | Facility: CLINIC | Age: 44
End: 2024-02-04
Payer: COMMERCIAL

## 2024-02-04 LAB
CREAT SERPL-MCNC: 0.99 MG/DL (ref 0.51–0.95)
EGFRCR SERPLBLD CKD-EPI 2021: 71 ML/MIN/1.73M2
ERYTHROCYTE [DISTWIDTH] IN BLOOD BY AUTOMATED COUNT: 16 % (ref 10–15)
HCT VFR BLD AUTO: 30.8 % (ref 35–47)
HGB BLD-MCNC: 9.5 G/DL (ref 11.7–15.7)
MCH RBC QN AUTO: 31.5 PG (ref 26.5–33)
MCHC RBC AUTO-ENTMCNC: 30.8 G/DL (ref 31.5–36.5)
MCV RBC AUTO: 102 FL (ref 78–100)
PATH REPORT.COMMENTS IMP SPEC: NORMAL
PATH REPORT.FINAL DX SPEC: NORMAL
PATH REPORT.MICROSCOPIC SPEC OTHER STN: NORMAL
PATH REPORT.MICROSCOPIC SPEC OTHER STN: NORMAL
PATH REPORT.RELEVANT HX SPEC: NORMAL
PLATELET # BLD AUTO: 153 10E3/UL (ref 150–450)
POTASSIUM SERPL-SCNC: 3.8 MMOL/L (ref 3.4–5.3)
RBC # BLD AUTO: 3.02 10E6/UL (ref 3.8–5.2)
WBC # BLD AUTO: 5.4 10E3/UL (ref 4–11)

## 2024-02-04 PROCEDURE — 97530 THERAPEUTIC ACTIVITIES: CPT | Mod: GP

## 2024-02-04 PROCEDURE — 250N000013 HC RX MED GY IP 250 OP 250 PS 637: Performed by: HOSPITALIST

## 2024-02-04 PROCEDURE — 82565 ASSAY OF CREATININE: CPT | Performed by: HOSPITALIST

## 2024-02-04 PROCEDURE — 250N000011 HC RX IP 250 OP 636: Performed by: HOSPITALIST

## 2024-02-04 PROCEDURE — 210N000002 HC R&B HEART CARE

## 2024-02-04 PROCEDURE — 99232 SBSQ HOSP IP/OBS MODERATE 35: CPT | Performed by: HOSPITALIST

## 2024-02-04 PROCEDURE — 97116 GAIT TRAINING THERAPY: CPT | Mod: GP

## 2024-02-04 PROCEDURE — 84132 ASSAY OF SERUM POTASSIUM: CPT | Performed by: HOSPITALIST

## 2024-02-04 PROCEDURE — 85027 COMPLETE CBC AUTOMATED: CPT | Performed by: HOSPITALIST

## 2024-02-04 RX ORDER — BISACODYL 10 MG
10 SUPPOSITORY, RECTAL RECTAL DAILY PRN
Status: DISCONTINUED | OUTPATIENT
Start: 2024-02-04 | End: 2024-02-06 | Stop reason: HOSPADM

## 2024-02-04 RX ORDER — POLYETHYLENE GLYCOL 3350 17 G/17G
17 POWDER, FOR SOLUTION ORAL DAILY PRN
Status: DISCONTINUED | OUTPATIENT
Start: 2024-02-04 | End: 2024-02-06 | Stop reason: HOSPADM

## 2024-02-04 RX ADMIN — FOLIC ACID 1 MG: 1 TABLET ORAL at 11:01

## 2024-02-04 RX ADMIN — MICONAZOLE NITRATE: 2 POWDER TOPICAL at 20:02

## 2024-02-04 RX ADMIN — IBUPROFEN 400 MG: 400 TABLET ORAL at 11:31

## 2024-02-04 RX ADMIN — MICONAZOLE NITRATE: 2 POWDER TOPICAL at 15:26

## 2024-02-04 RX ADMIN — ENOXAPARIN SODIUM 40 MG: 40 INJECTION SUBCUTANEOUS at 05:05

## 2024-02-04 RX ADMIN — LEVOTHYROXINE SODIUM 250 MCG: 100 TABLET ORAL at 11:00

## 2024-02-04 RX ADMIN — SENNOSIDES AND DOCUSATE SODIUM 2 TABLET: 50; 8.6 TABLET ORAL at 11:30

## 2024-02-04 RX ADMIN — ENOXAPARIN SODIUM 40 MG: 40 INJECTION SUBCUTANEOUS at 20:02

## 2024-02-04 ASSESSMENT — ACTIVITIES OF DAILY LIVING (ADL)
ADLS_ACUITY_SCORE: 30
ADLS_ACUITY_SCORE: 27
ADLS_ACUITY_SCORE: 29
ADLS_ACUITY_SCORE: 30
ADLS_ACUITY_SCORE: 29
ADLS_ACUITY_SCORE: 30
ADLS_ACUITY_SCORE: 27
ADLS_ACUITY_SCORE: 29
ADLS_ACUITY_SCORE: 30

## 2024-02-04 NOTE — PROGRESS NOTES
[facility]    Medicine Progress Note - Hospitalist Service    Date of Admission:  1/27/2024    Assessment & Plan     Yodit Harvey is a 43 year old female with past medical history significant for hypothyroidism and morbid obesity admitted on 1/27/2024 with dizziness and falls, found to be in SVT. She is also found to have mild hypoxia and compensated hypercapnia.  She was given 2 doses of adenosine in the ER and return to normal sinus rhythm    Underwent chest x-ray which was unremarkable but was low yield due to her body habitus, CT PE protocol did not show any evidence of PE and echo on admission showed EF of 55 to 60% with mild RV volume overload and in the LV diastolic function was indeterminate.  Patient was seen by cardiology and they recommended no diuretics.  She had mild troponin elevation which was likely due to demand due to underlying hypoxia.      She was being monitored and patient developed respiratory acidosis with hypercapnia and pulmonary team was consulted and she was started on BiPAP.  Patient had great suspicion for obstructive sleep apnea/obesity hypoventilation syndrome.  She was transferred to ICU and blood gases were being monitored and she clinically improved and mentation was back to normal.  Social work team was consulted and the patient needed to meet 3 out of 3 criteria as to qualify for home BiPAP.    Patient had success with ABGs and her PaCO2 went from baseline 48-59 and qualified for BiPAP per pulmonary team.  Orders have been placed for outpatient home BiPAP.    Patient also has significant hypothyroidism and has been noncompliant with her levothyroxine and has been started on PTA levothyroxine with plan to have her follow as outpatient with repeat checks in the next 1 to 2 months.    She also has baseline anemia and hemoglobin on presentation was 10.5 and this seems chronic and iron panel and B12 have been negative.  She will need to follow this as  an outpatient    Patient has been seen by physical therapy and Occupational Therapy and at time of discharge she will be going home with home health        Respiratory failure with hypoxemia and hypercapnia likely due to OHS and JL  Respiratory acidosis-resolved  Hypercapnia- suspect chronic, likely OHS and JL   History of recent COVID-19 infection in December 2023  Encephalopathy likely due to hypercapnia-Resolved  -On admission patient endorsed some shortness of breath following conversion to NSR. O2 sats in the ED down to mid 80s at times. VBG showed elevated pCO2 to 62 but with a normal pH, so this is presumed chronic.   -CXR was unremarkable but limited due to body habitus.   - CTPE negative for pulmonary embolism. Mild dependent probable atelectasis vs less likely infiltrate.   -Echo on admission showed EF of 55 to 60%, LV diastolic function is indeterminate and mild RV volume overload  - she was being monitored on the cardiology floor and patient VBG on 1/29 were consistent with severe respiratory acidosis, hypercapnia and worsening encephalopathy and she was transferred to ICU where she was seen by the ICU attending and along with pulmonary and patient was continued with BiPAP with improvement in her blood gases with improvement in mentation and hypercapnia improved to baseline  -Status one dose of Lasix in the ICU  -Elevated proBNP as per cardiology is due to obesity hypoventilation syndrome and use diuresis on as-needed basis depending on renal function  -Transferred back to [facility] on 1/31/2024 and using BiPAP during nighttime  -Patient seems to have met the criteria for home BiPAP(with baseline ABGs and ABGs without BiPAP during nighttime)  -Orders for outpatient BiPAP were placed by Dr. Galvez from the pulmonary team   -also she had overnight oximetry and needs 2 L During nighttime  -Clinically on exam patient was on room air and she is Aox3  -Will still need official sleep study as outpatient and orders  placed        SVT   Dizziness, falls likely 2/2 above   -Pt presented to the ED after two falls. The first fall she reports was a trip and fall, however when getting up she started feeling dizzy so sat down. When she got up she again started to feel dizzy and was assisted to the ground by her . She thinks though that she might have passed out briefly during the second fall. When EMS arrived her HR was 177.   - EKG in the ED showed SVT. She was given adenosine x2 and she eventually returned to a normal sinus rhythm.   -Patient evaluated by the cardiology team and initial differentials included heart failure with preserved ejection fraction versus high-output heart failure and they were not concerned about ischemia and they recommended to treat her underlying thyroid issues  -Patient was being monitored in the hospital and troponins peaked at 170 and they have trended down   -Echo on admission showed EF of 55 to 60%, LV diastolic function is indeterminate and mild RV volume overload  -Heart rate have been stable during the course of hospitalization    Troponin elevation likely due to demand  -On admission presented without any chest pain and troponins peaked at around 170s and trended down   - TTE and Cardiology consult as noted above       acute kidney injury-resolved  -Creatinine on admission is 1.47. No prior creatinine on file.   -Renal function has been fluctuating and is on the higher side and is 1.05  -Will monitor daily and diuretics should be used cautiously      Mild hypokalemia-resolved     Uncontrolled hypothyroidism   Pt with known hx of hypothyroidism. PTA is on levothyroxine 250mcg daily, but has not been taking for years and prior to that was only intermittently taking  - TSH is 144 and T4 is 0.18   -Continue with PTA levothyroxine at 250 mcg    -Will recommend to follow-up with endocrinology     Mild chronic macrocytic anemia   -Hemoglobin on admission was 10.6 and in EMR last recorded value is  "in 2016 when it was 11.6  -B12, folate and iron studies are adequate  -I do not see any bleeding but I have noticed that she had a lot of oozing from the midline yesterday and also has been having multiple blood draws and will check FOBT  -I think the acute drop in the hospital can be due to multiple blood draws but continue to monitor and there is no evidence of any active bleeding  - peripheral smear sent and results are pending  --Hemoglobin has been relatively stable around 9.5    Constipation  -Continue with senna and have ordered as needed MiraLAX and bisacodyl and her hypothyroidism might be contributing and encouraged to drink prune juice      Hyperglycemia  -  HbA1c during hospitalization is 5.7    Depressed mood  -No suicidal or homicidal ideation  -Seen by psychiatry and no medications offered and patient is very motivated to get better as far as her health is concerned    Headache-resolved likely due to CO2 narcosis     Morbid obesity   -Body mass index is 82.18 kg/m .  -Pt reports she has gained over 200lbs since the start of COVID.   - Discussed briefly recommendations for weight loss   - Treating hypothyroidism should help   - Would also recommend referral to endocrinology or comprehensive weight management clinic.        Disposition  -Home with home care with RN, PT, OT likely tomorrow if home BiPAP is arranged        Diet: Regular Diet Adult    DVT Prophylaxis: Pneumatic Compression Devices, subcu Lovenox  Gonzales Catheter: Not present  Lines: PRESENT           Cardiac Monitoring: ACTIVE order. Indication: Tachyarrhythmias, acute (48 hours)  Code Status: Full Code      Clinically Significant Risk Factors                         # Severe Obesity: Estimated body mass index is 78.44 kg/m  as calculated from the following:    Height as of this encounter: 1.626 m (5' 4\").    Weight as of this encounter: 207.3 kg (457 lb).      # Financial/Environmental Concerns: none         Disposition Plan      Expected " Discharge Date: 02/05/2024      Destination: home with help/services            Alma Christopher MD  Hospitalist Service  [facility]  Securely message with [employer] (more info)  Text page via [employer] Paging/Directory   _    I am off service from tomorrow morning and her care will be taken over by hospital medicine team  _____________________________________________________________________    Interval History     Seen today and patient was sitting in the chair and she is alert oriented x 3 and has no fever or chills.  She does have constipation and bowel regimen was changed.  I discussed with her nurse who reached out to RT and BiPAP settings were placed.  I did discuss with the nurse that orders for home BiPAP have been placed by the pulmonary team today    Physical Exam   Vital Signs: Temp: 98.3  F (36.8  C) Temp src: Axillary BP: 93/77 Pulse: 59   Resp: 22 SpO2: 98 % O2 Device: Nasal cannula Oxygen Delivery: 3 LPM  Weight: 457 lbs 0 oz        General: aox3  Respiratory:ctla  Cardiovascular: Regular rate , S1 and S2 normal   Abdomen:   soft , non tender , non distended and bowel sound present and she is on nasal cannula  Neurologic: No facial droop  Musculoskeletal: Normal Range of motion over upper and lower extremities bilaterally   Psychiatric: cooperative      Medical Decision Making           Time spent in care of patient is 45 minutes and reviewed labs and discussed plan of care in detail with the patient, nursing staff,  and will reach out to the pulmonary team    Data     I have personally reviewed the following data over the past 24 hrs:    5.4  \   9.5 (L)   / 153     N/A N/A N/A /  N/A   3.8 N/A N/A \       Imaging results reviewed over the past 24 hrs:   No results found for this or any previous visit (from the past 24 hour(s)).

## 2024-02-04 NOTE — CONSULTS
Care Management Follow Up    Length of Stay (days): 8    Expected Discharge Date: 02/05/2024     Concerns to be Addressed: discharge planning     Patient plan of care discussed at interdisciplinary rounds: Yes    Anticipated Discharge Disposition: Home Care     Anticipated Discharge Services: None  Anticipated Discharge DME: Walker    Patient/family educated on Medicare website which has current facility and service quality ratings:    Education Provided on the Discharge Plan: Yes  Patient/Family in Agreement with the Plan: yes    Referrals Placed by CM/SW: Homecare  Private pay costs discussed: Not applicable    Additional Information:  Writer talked to discharging MD per review of labs/notes from RT patient does Qualify for bipap.  Writer called RT Alejandra, she noted that they would need to review the order she does not have access to a computer and will be reviewing in the a.m. writer did review the DME order per [employer] however Alejandra said they may need additional information.  Alejandra stated she will call the HC RN 2/5 with updated information for discharge planning.  Patient is likely ready for discharge to home 2/5 with homecare and approval for bipap and set up.      Dee Rand RN, BSN, ACM   Care Transitions Specialist  [facility]  Care Transitions Specialist  [address] [address]  [email or web address]  Office: [phone] Fax: [phone]  [facility]

## 2024-02-04 NOTE — PROGRESS NOTES
A&O pleasant, cooperative. BiPAP overnight. Up SBA walker to bathroom. Constipation, senna x1 given. R midline, unit collect. Turn and reposition. C/o R leg burning if in one position for too long. Pt states she slept well overnight.

## 2024-02-04 NOTE — PROGRESS NOTES
ANOTHER ABG WAS DRAWN OFF BIPAP AND PATIENT'S PACO2 WENT FROM 48 TO 59, SHE ALREADY HAD A QUALIFING PFT SO THIS PATIENT DOES QUALIFY FOR A BIPAP S () UNDER THE OBESITY HYPOVENTILATION DIAGNOSIS. WE NOW NEED A BIPAP S RX AND A MEDICAL NECESSITY FOR BIPAP AND THIS PATIENT WILL BE READY FOR SETUP UPON DISCHARGE FROM THE HOSPITAL. I LEFT A MESSAGE FOR SUN TO CALL ME BACK.    AUDRA FLORES-RRT  [phone]

## 2024-02-04 NOTE — PLAN OF CARE
Goal Outcome Evaluation:         No changes, was on 3lnc until 1130 after ABGs were obtained.   Sats dropped to 30% while sleeping on 3 LNC.   With Bipap 90-98%.  Up to the BR x 2 with assist  of 1 & walker.    updated by RN and hospitalist as well.   SR/SB.

## 2024-02-05 ENCOUNTER — APPOINTMENT (OUTPATIENT)
Dept: PHYSICAL THERAPY | Facility: CLINIC | Age: 44
End: 2024-02-05
Payer: COMMERCIAL

## 2024-02-05 ENCOUNTER — APPOINTMENT (OUTPATIENT)
Dept: OCCUPATIONAL THERAPY | Facility: CLINIC | Age: 44
End: 2024-02-05
Payer: COMMERCIAL

## 2024-02-05 LAB
ERYTHROCYTE [DISTWIDTH] IN BLOOD BY AUTOMATED COUNT: 16.1 % (ref 10–15)
HCT VFR BLD AUTO: 28.3 % (ref 35–47)
HGB BLD-MCNC: 8.8 G/DL (ref 11.7–15.7)
MCH RBC QN AUTO: 31.4 PG (ref 26.5–33)
MCHC RBC AUTO-ENTMCNC: 31.1 G/DL (ref 31.5–36.5)
MCV RBC AUTO: 101 FL (ref 78–100)
PLATELET # BLD AUTO: 152 10E3/UL (ref 150–450)
POTASSIUM SERPL-SCNC: 3.8 MMOL/L (ref 3.4–5.3)
RBC # BLD AUTO: 2.8 10E6/UL (ref 3.8–5.2)
WBC # BLD AUTO: 4.8 10E3/UL (ref 4–11)

## 2024-02-05 PROCEDURE — 94660 CPAP INITIATION&MGMT: CPT

## 2024-02-05 PROCEDURE — 250N000013 HC RX MED GY IP 250 OP 250 PS 637: Performed by: HOSPITALIST

## 2024-02-05 PROCEDURE — 210N000002 HC R&B HEART CARE

## 2024-02-05 PROCEDURE — 85027 COMPLETE CBC AUTOMATED: CPT | Performed by: HOSPITALIST

## 2024-02-05 PROCEDURE — 99233 SBSQ HOSP IP/OBS HIGH 50: CPT | Performed by: INTERNAL MEDICINE

## 2024-02-05 PROCEDURE — 999N000157 HC STATISTIC RCP TIME EA 10 MIN

## 2024-02-05 PROCEDURE — 97535 SELF CARE MNGMENT TRAINING: CPT | Mod: GO

## 2024-02-05 PROCEDURE — 97116 GAIT TRAINING THERAPY: CPT | Mod: GP

## 2024-02-05 PROCEDURE — 250N000011 HC RX IP 250 OP 636: Performed by: HOSPITALIST

## 2024-02-05 PROCEDURE — 97530 THERAPEUTIC ACTIVITIES: CPT | Mod: GP

## 2024-02-05 PROCEDURE — 84132 ASSAY OF SERUM POTASSIUM: CPT | Performed by: INTERNAL MEDICINE

## 2024-02-05 RX ORDER — LEVOTHYROXINE SODIUM 125 UG/1
250 TABLET ORAL DAILY
Qty: 60 TABLET | Refills: 1 | Status: SHIPPED | OUTPATIENT
Start: 2024-02-05 | End: 2024-02-05

## 2024-02-05 RX ORDER — LEVOTHYROXINE SODIUM 125 UG/1
250 TABLET ORAL
Qty: 60 TABLET | Refills: 1 | Status: SHIPPED | OUTPATIENT
Start: 2024-02-06 | End: 2024-03-20

## 2024-02-05 RX ADMIN — ENOXAPARIN SODIUM 40 MG: 40 INJECTION SUBCUTANEOUS at 09:31

## 2024-02-05 RX ADMIN — MICONAZOLE NITRATE: 2 POWDER TOPICAL at 21:28

## 2024-02-05 RX ADMIN — MICONAZOLE NITRATE: 2 POWDER TOPICAL at 09:31

## 2024-02-05 RX ADMIN — ENOXAPARIN SODIUM 40 MG: 40 INJECTION SUBCUTANEOUS at 21:26

## 2024-02-05 RX ADMIN — LEVOTHYROXINE SODIUM 250 MCG: 100 TABLET ORAL at 09:31

## 2024-02-05 RX ADMIN — FOLIC ACID 1 MG: 1 TABLET ORAL at 09:31

## 2024-02-05 ASSESSMENT — ACTIVITIES OF DAILY LIVING (ADL)
ADLS_ACUITY_SCORE: 33
ADLS_ACUITY_SCORE: 30
ADLS_ACUITY_SCORE: 33
ADLS_ACUITY_SCORE: 29
ADLS_ACUITY_SCORE: 33
ADLS_ACUITY_SCORE: 30
ADLS_ACUITY_SCORE: 30
ADLS_ACUITY_SCORE: 33

## 2024-02-05 NOTE — PROGRESS NOTES
[facility]    Medicine Progress Note - Hospitalist Service       Date of Admission:  1/27/2024    Assessment & Plan   Yodit Harvey is a 43 year old female with hx of untreated JL/OHS, hypothyroidism, morbid obesity, and non-compliance who presented on 1/27/2024 for evaluation of dizziness with recurrent falls, and was found to be in SVT. She converted to sinus rhythm following 2 doses of adenosine, however was noted to be mildly hypoxic afterward. Hospital course subsequently complicated by hypercapnic respiratory failure with respiratory acidosis and associated encephalopathy attributed to untreated JL and OHS; improved with biPAP     Acute respiratory with hypoxemia and hypercapnia due to untreated JL and obesity hypoventilation syndrome  Acute metabolic encephalopathy due to CO2 narcosis  Symptomatic SVT   Elevated troponin due to demand ischemia, NSTEMI type II  SUELLEN, resolved  Hypokalemia, resolved  Hypothyroidism  Chronic macrocytic anemia  Constipation  Depressed mood  Morbid obesity (BMI 82.18)    - Otherwise medically ready for discharge, but needs repeat overnight oximetry tonight in order to qualify for nocturnal O2 (ordered)          Diet: Regular Diet Adult  Diet    DVT Prophylaxis: Enoxaparin (Lovenox) SQ  Gonzales Catheter: Not present  Code Status: Full Code         Disposition: Expected discharge home tomorrow    The patient's care was discussed with the Bedside Nurse, Care Coordinator/, and Patient.    Tamica Conroy MD  Hospitalist Service  [facility]  Contact information available via [facility] Paging/Directory    ______________________________________________________________________    Interval History   No acute events overnight. Patient offers no complaints; tolerating biPAP while sleeping. Otherwise medically ready for discharge, but overnight oximetry study was completed >48 hours ago, and home care agency needs updated  oximetry study within 48 hours.      Medical Decision Making   55 MINUTES SPENT BY ME on the date of service doing chart review, history, exam, documentation & further activities per the note.      Data reviewed today: I reviewed all medications, new labs and imaging results over the last 24 hours. I personally reviewed no images or EKG's today.    Physical Exam   Vital Signs: Temp: 98.4  F (36.9  C) Temp src: Oral BP: 103/67 Pulse: 65   Resp: 16 SpO2: 96 % O2 Device: None (Room air) Oxygen Delivery: 1 LPM  Weight: 457 lbs 0 oz  Constitutional: Resting in bed, NAD  HEENT: Sclera white, MMM  Respiratory: Breathing non-labored. Lungs CTAB - no wheezes, crackles, or rhonchi  Cardiovascular: Heart sounds distant, RRR. Trace pedal edema  GI: +BS, abd soft/NT  Skin/Integument: No rash  Musculoskeletal: Normal muscle bulk and tone  Neuro: Alert and appropriate, RIVERO  Psych: Calm and cooperative    Data   Recent Labs   Lab 02/05/24  0538 02/04/24  0515 02/03/24  0609 02/02/24  0551 02/01/24  1401 02/01/24  1117 02/01/24  0425 01/31/24  2004 01/31/24  1536   WBC 4.8 5.4 5.4 4.5  --    < > 4.4  --  4.6   HGB 8.8* 9.5* 9.7* 8.7*  --    < > 8.5*  --  8.5*   * 102* 100 101*  --    < > 101*  --  102*    153 161 149*  --    < > 147*  147*  --  145*   NA  --   --   --  139  --   --  140  --  141   POTASSIUM 3.8 3.8 4.0 3.7  --    < > 3.4  3.3*  --  3.4   CHLORIDE  --   --   --  101  --   --  100  --  102   CO2  --   --   --  31*  --   --  30*  --  32*   BUN  --   --   --  21.8*  --   --  19.5  --  19.8   CR  --  0.99*  --  1.05*  --   --  1.05*  --  1.07*   ANIONGAP  --   --   --  7  --   --  10  --  7   BECKY  --   --   --  9.3  --   --  9.0  --  8.7   GLC  --   --   --  108* 89  --  89   < > 226*   ALBUMIN  --   --   --   --   --   --  3.8  --   --    PROTTOTAL  --   --   --   --   --   --  6.8  --   --    BILITOTAL  --   --   --   --   --   --  0.4  --   --    ALKPHOS  --   --   --   --   --   --  42  --   --    ALT   --   --   --   --   --   --  19  --   --    AST  --   --   --   --   --   --  33  --   --     < > = values in this interval not displayed.         No results found for this or any previous visit (from the past 24 hour(s)).    Medications    - MEDICATION INSTRUCTIONS -        enoxaparin ANTICOAGULANT  40 mg Subcutaneous Q12H    folic acid  1 mg Oral Daily    levothyroxine  250 mcg Oral QAM AC    miconazole   Topical BID    sodium chloride (PF)  10 mL Intracatheter Q8H    sodium chloride (PF)  10-40 mL Intracatheter Q7 Days    sodium chloride (PF)  3 mL Intracatheter Q8H

## 2024-02-05 NOTE — PROGRESS NOTES
I attest that I have seen and evaluated Yodit Harvey face to face. She has a chronic illness of E66.2 - Morbid (severe) obesity with alveolar hypoventilation. This patient would greatly benefit from a home Non Invasive Ventilator device for nocturnal and daytime use with portability for recovery breaths. This device would aide in ventilator support to improve pulmonary status and decrease work of breathing. I strongly believe therapy is necessary to improve future outcomes and decreased hospital readmissions.     I, the undersigned, certify that the above prescribed supplies are medically necessary for this patient and is both reasonable and necessary in reference to accepted standards of medical and necessary in reference to accepted standards of medical practice in the treatment of this patient's condition and is not prescribed as a convenience.    SHELIA Conroy MD  Hospitalist  [phone]

## 2024-02-05 NOTE — PROGRESS NOTES
Care Management Follow Up    Length of Stay (days): 9    Expected Discharge Date: 02/05/2024     Concerns to be Addressed: discharge planning     Patient plan of care discussed at interdisciplinary rounds: Yes    Anticipated Discharge Disposition: Home Care     Anticipated Discharge Services: home care RN, PT and OT  Anticipated Discharge DME: Meredith Munroe    Patient/family educated on Medicare website which has current facility and service quality ratings:    Education Provided on the Discharge Plan: Yes  Patient/Family in Agreement with the Plan: yes    Referrals Placed by CM/SW: Homecare  Private pay costs discussed: Not applicable    Additional Information:  Spoke with Sathish at [facility] regading BiPap for home.  BiPap Order for discharge is entered and supporting progress note for medical necessity is charted.  Alejandra with [facility] is bringing BiPap for home to hospital at 1pm and will review with pt and her , Anmol, prior to discharge.    Rosibel Haider RN, BS  Care Coordinator  [email or web address]

## 2024-02-05 NOTE — PLAN OF CARE
Yodit is alert, oriented, pleasant, cooperative. VSS on room air while awake; BiPAP while napping/sleeping. Will repeat home O2 study tonight to qualify for home use. BiPAP delivered and introduced to pt at bedside this afternoon. Up independently or calls as needed. Anticipate discharge to home tomorrow with homecare.

## 2024-02-05 NOTE — DISCHARGE SUMMARY
[facility]  Hospitalist Discharge Summary      Date of Admission:  1/27/2024  Date of Discharge:  2/6/2024  Discharging Provider: Tamica Conroy MD      Discharge Diagnoses   Acute respiratory with hypoxemia and hypercapnia due to untreated JL and obesity hypoventilation syndrome  Acute metabolic encephalopathy due to CO2 narcosis  Symptomatic SVT   Elevated troponin due to demand ischemia, NSTEMI type II  SUELLEN, resolved  Hypokalemia, resolved  Hypothyroidism  Chronic macrocytic anemia  Constipation  Depressed mood  Morbid obesity (BMI 82.18)    Follow-ups Needed After Discharge   Follow-up Appointments     Follow-up and recommended labs and tests       Follow up with primary care provider, Lexi Salazar, within 1 week for   hospital follow-up.  Recommend repeat thyroid studies in 4 weeks.  Follow   up with Pulmonology as scheduled          Discharge Disposition   Discharged to home  Condition at discharge: Stable    Hospital Course   Yodit Harvey is a 43 year old female with hx of untreated JL/OHS, hypothyroidism, morbid obesity, and non-compliance who presented on 1/27/2024 for evaluation of dizziness with recurrent falls, and was found to be in SVT. She converted to sinus rhythm following 2 doses of adenosine, however was noted to be mildly hypoxic afterward. CXR was unremarkable but likely low yield due to her body habitus; CT PE protocol did not show any evidence of PE. TTE on admission showed EF of 55 to 60% with mild RV volume overload, LV diastolic function indeterminate. Cardiology was consulted, and did not feel diuretics were warranted. Troponin was mildly elevated, but attributed to demand ischemia in setting of underlying hypoxia.    Hospital course subsequently complicated by hypercapnic respiratory failure with respiratory acidosis and associated encephalopathy. Pulmonology was consulted, and felt presentation was consistent with JL/OHS. She improved with biPAP.  She is now discharging home with biPAP with 2L o2 to be used with the device.      Patient also has prescription for levothyroxine 250 mcg daily though has been non-compliant. TSH ~145 this admission. She has been resumed on her levothyroxine as previously prescribed; Rx refilled at discharge    Depressed mood was also noted this admission. Pt denied SI/HI. Psychiatry was consulted though did not feel medications were indicated at this time. Patient seems very motivated to improve her health moving forward.    Importance of medical compliance has been discussed at length with the patient. Home RN/PT/OT ordered at discharge    Consultations This Hospital Stay   CARDIOLOGY IP CONSULT  PULMONARY IP CONSULT  INTENSIVIST IP CONSULT  PSYCHIATRY IP CONSULT  WOUND OSTOMY CONTINENCE NURSE  IP CONSULT  LYMPHEDEMA THERAPY IP CONSULT  PHYSICAL THERAPY ADULT IP CONSULT  VASCULAR ACCESS ADULT IP CONSULT  INTERVENTIONAL RADIOLOGY ADULT/PEDS IP CONSULT  CARE MANAGEMENT / SOCIAL WORK IP CONSULT    Code Status   Full Code    Time Spent on this Encounter   I, Tamica Conroy MD, personally saw the patient today and spent 45 minutes discharging this patient.       Tamica Conroy MD  [facility]  [address] [place] [address], [address] [address]  Phone: [phone]  ______________________________________________________________________    Physical Exam   Vital Signs: Temp: 98.4  F (36.9  C) Temp src: Oral BP: 103/67 Pulse: 65   Resp: 16 SpO2: 96 % O2 Device: None (Room air) Oxygen Delivery: 1 LPM  Weight: 457 lbs 0 oz    Constitutional: Resting in bed, NAD  HEENT: Sclera white, conjunctiva clear, EOMI, MMM  Respiratory: Breathing non-labored. Lungs CTAB - no wheezes/crackles/rhonchi  Cardiovascular: Heart sounds distant, RRR. Trace pedal edema  GI: +BS. Abd soft/NT  Skin: Warm and dry. No rash.  Musculoskeletal: Normal muscle bulk and tone  Neurologic: Alert and appropriate.  MAT  Psychiatric: Calm and cooperative    Primary Care Physician   Lexi Salazar    Discharge Orders      Home CPAP Order    If providing a ResMed device for this [place] patient, please add [facility] as an Integrator in [facility] along with patient's MRN: [id number] and CSN: [id number].     Home Care Referral      Activity    Your activity upon discharge: activity as tolerated     Reason for your hospital stay    Supraventricular tachycardia which has resolved. Respiratory failure due to untreated sleep apnea     Follow-up and recommended labs and tests     Follow up with primary care provider, Lexi Salazar, within 1 week for hospital follow-up.  Recommend repeat thyroid studies in 4 weeks.  Follow up with Pulmonology as scheduled     Diet    Follow this diet upon discharge: Regular diet       Significant Results and Procedures   Most Recent 3 CBC's:  Recent Labs   Lab Test 02/05/24  0538 02/04/24  0515 02/03/24  0609   WBC 4.8 5.4 5.4   HGB 8.8* 9.5* 9.7*   * 102* 100    153 161     Most Recent 3 BMP's:  Recent Labs   Lab Test 02/05/24  0538 02/04/24  0515 02/03/24  0609 02/02/24  0551 02/01/24  1401 02/01/24  1117 02/01/24  0425 01/31/24 2004 01/31/24  1536   NA  --   --   --  139  --   --  140  --  141   POTASSIUM 3.8 3.8 4.0 3.7  --    < > 3.4  3.3*  --  3.4   CHLORIDE  --   --   --  101  --   --  100  --  102   CO2  --   --   --  31*  --   --  30*  --  32*   BUN  --   --   --  21.8*  --   --  19.5  --  19.8   CR  --  0.99*  --  1.05*  --   --  1.05*  --  1.07*   ANIONGAP  --   --   --  7  --   --  10  --  7   BECKY  --   --   --  9.3  --   --  9.0  --  8.7   GLC  --   --   --  108* 89  --  89   < > 226*    < > = values in this interval not displayed.   ,   Results for orders placed or performed during the hospital encounter of 01/27/24   XR Ankle Left G/E 3 Views    Narrative    EXAM: XR ANKLE LEFT G/E 3 VIEWS  LOCATION: [facility]  DATE:  1/27/2024    INDICATION: Fall. Pain  COMPARISON: None.      Impression    IMPRESSION: Soft tissue edema. No visible fracture or dislocation. Plantar calcaneal spur. Several corticated calcifications plantar aspect of the midfoot presumably chronic.   Chest XR,  PA & LAT    Narrative    EXAM: XR CHEST 2 VIEWS  LOCATION: [facility]  DATE: 1/27/2024    INDICATION: SOB  COMPARISON: None.      Impression    IMPRESSION: Lateral view is underpenetrated due to body habitus.    Lungs are clear. No signs of pneumonia or failure. Heart is of normal size. No effusions.   CT Chest Pulmonary Embolism w Contrast    Narrative    EXAM: CT CHEST PULMONARY EMBOLISM W CONTRAST  LOCATION: [facility]  DATE: 1/27/2024    INDICATION: Hypoxia.  COMPARISON: None.  TECHNIQUE: CT chest pulmonary angiogram during arterial phase injection of IV contrast. Multiplanar reformats and MIP reconstructions were performed. Dose reduction techniques were used.   CONTRAST: 83mL Isovue 370    FINDINGS:  ANGIOGRAM CHEST: Pulmonary arteries are normal caliber and negative for pulmonary emboli. Thoracic aorta is negative for dissection. No CT evidence of right heart strain.    LUNGS AND PLEURA: Mild dependent probable atelectasis versus less likely infiltrate. No effusions.    MEDIASTINUM/AXILLAE: No adenopathy. No aneurysm.    CORONARY ARTERY CALCIFICATION: None.    UPPER ABDOMEN: No acute findings.    MUSCULOSKELETAL: No frankly destructive bony lesions.      Impression    IMPRESSION:  1.  No pulmonary embolism demonstrated.  2.  Mild dependent probable atelectasis versus less likely infiltrate.   IR PICC Placement > 5 Yrs of Age    Narrative    IR PICC PLACEMENT > 5 YEARS OF AGE 1/30/2024 5:10 PM    HISTORY: 43-year-old woman with need for long-term IV access. Attempts  were made for access by IV team, though unsuccessful. Request made for  placement in IR. Patient is greater than 500 pounds, difficult  to  position and document location.    TECHNIQUE: Patient was brought to interventional radiology department  after informed consent obtained. Patient was placed in a supine  position. Maximal Sterile Barrier Technique Utilized: Cap AND mask AND  sterile gown AND sterile gloves AND sterile full body drape AND hand  hygiene AND skin preparation 2% chlorhexidine for cutaneous antisepsis  (or acceptable alternative antiseptics). Sterile Ultrasound Technique  Utilized ?Sterile gel AND sterile probe covers. 1% lidocaine was used  for local anesthetic to identify the basilic vein in the upper arm,  patency confirmed, and ultrasound image stored for documentation.  Micropuncture kit was used to access the basilic vein. A peel-away  sheath was placed and a PICC was advanced until the right atrial/SVC  junction, somewhat difficult to document as patient was partially on  table, though unable to fully migrate to the table given body habitus.  The triple-lumen PICC is 50 cm in length with 2 cm external to the  skin surface. The 3 lumens aspirated normally and flushed with normal  saline. Of note, the PICC micropuncture access kit was used to access  the basilic vein, without difficulty.    Sedation: None  Fluoroscopic time: 1.6 minutes  Air Kerma: 57.5 mGy  Local anesthetic: 8 mL of 1% lidocaine. Four spot fluoroscopic images  obtained demonstrate placement of PICC tip at the RA/SVC junction.      Impression    IMPRESSION: Successful placement of triple lumen PICC in the right  upper arm. PICC is ready for immediate use.    GERMAN KULKARNI MD         SYSTEM ID:  [id number]   XR Chest Port 1 View    Narrative    CHEST ONE VIEW  1/31/2024 1:55 PM     HISTORY: Verify PICC .    COMPARISON: Chest radiograph 1/27/2024. Fluoroscopic image 1/30/2024.      Impression    IMPRESSION: Patient is rotated.     PICC tip projects in the region of the brachiocephalic  confluence/upper SVC. This could be advanced by approximately 8-9 cm.  This  appears retracted from the recent fluoroscopic image given  differences in technique.     Left midlung atelectasis. No focal consolidation, pleural effusion or  pneumothorax. Cardiomediastinal silhouette is unremarkable.    VLADIMIR SHETH MD         SYSTEM ID:  [id number]   Echocardiogram Complete     Value    LVEF  55-60%    [facility]    [id number]  [id number]  [id number]  2010^NEGAR^EDMAR^BETH     [facility]  [facility]  Echocardiography Laboratory  [address]  [address]  [place] [address]  Phone [phone]  Fax [phone]     Name: BRENDA HAYWOOD  MRN: [id number]  : 1980  Study Date: 2024 08:25 AM  Age: 43 yrs  Gender: Female  Patient Location: [facility]  Reason For Study: Paroxsysmal SVT  Ordering Physician: EDMAR BILLS  Performed By: Karon Stiles     BSA: 2.9 m2  Height: 65 in  Weight: 493 lb  HR: 72  BP: 104/59 mmHg  ______________________________________________________________________________  Procedure  Complete Portable Echo Adult. Optison (NDC #[id number]) given intravenously.  ______________________________________________________________________________  Interpretation Summary     Left ventricular systolic function is normal.  The visual ejection fraction is 55-60%.  The right ventricle is mildly dilated.  Mildly decreased right ventricular systolic function  The study was technically difficult. There is no comparison study available.  ______________________________________________________________________________  Left Ventricle  The left ventricle is normal in size. Left ventricular systolic function is  normal. The visual ejection fraction is 55-60%. Left ventricular diastolic  function is indeterminate. Paradoxical septal motion is consistent with right  ventricular volume overload. No regional wall motion abnormalities noted.     Right Ventricle  The right ventricle is mildly dilated. Mildly decreased right  ventricular  systolic function.     Atria  The left atrium is not well visualized. Right atrium not well visualized.     Mitral Valve  The mitral valve is not well visualized. There is trace mitral regurgitation.     Tricuspid Valve  No tricuspid regurgitation. Right ventricular systolic pressure could not be  approximated due to inadequate tricuspid regurgitation. IVC diameter <2.1 cm  collapsing >50% with sniff suggests a normal RA pressure of 3 mmHg.     Aortic Valve  The aortic valve is not well visualized. No aortic stenosis is present.     Pulmonic Valve  The pulmonic valve is not well visualized.     Vessels  The aortic root is normal size.     Pericardium  There is no pericardial effusion.     Rhythm  Sinus rhythm was noted.     ______________________________________________________________________________  MMode/2D Measurements & Calculations  asc Aorta Diam: 3.2 cm  Asc Ao diam index BSA (cm/m2): 1.1  Asc Ao diam index Ht(cm/m): 1.9     Doppler Measurements & Calculations  MV E max phu: 76.5 cm/sec  MV A max phu: 52.9 cm/sec  MV E/A: 1.4  MV dec slope: 505.3 cm/sec2  MV dec time: 0.15 sec  PA acc time: 0.11 sec     ______________________________________________________________________________  Report approved by: Orlin Forde 01/28/2024 01:28 PM             Discharge Medications   Current Discharge Medication List        CONTINUE these medications which have CHANGED    Details   levothyroxine (SYNTHROID/LEVOTHROID) 125 MCG tablet Take 2 tablets (250 mcg) by mouth every morning (before breakfast)  Qty: 60 tablet, Refills: 1    Associated Diagnoses: Hypothyroidism, unspecified type           CONTINUE these medications which have NOT CHANGED    Details   levonorgestrel (MIRENA) 20 MCG/24HR IUD 1 each by Intrauterine route once - inserted 2/18/19 [facility]           Allergies   No Known Allergies

## 2024-02-05 NOTE — PROGRESS NOTES
I attest that I have seen and evaluated Yodit Harvey face to face. She has a chronic illness of E66.2 - Morbid (severe) obesity with alveolar hypoventilation. This patient would greatly benefit from a BIPAP device for nocturnal and daytime use with portability for recovery breaths. This device would aide in ventilator support to improve pulmonary status and decrease work of breathing. I strongly believe therapy is necessary to improve future outcomes and decreased hospital readmissions.     I, the undersigned, certify that the above prescribed supplies are medically necessary for this patient and is both reasonable and necessary in reference to accepted standards of medical and necessary in reference to accepted standards of medical practice in the treatment of this patient's condition and is not prescribed as a convenience.    SHELIA Conroy MD  Hospitalist  [phone]   Patient came in for lab draw, drawn without incident    2 Gold  1 Lavender

## 2024-02-05 NOTE — PLAN OF CARE
Alert and oriented x 4. VS stable, and no complaints of pain. She was up in the chair and moved with stand by assist and a walker. While she was up she maintained her O2 sats well on room air. When she got into bed she required 1L NC to maintain her sats. She had been constipated this morning but got 2 Senna and has had many stools since. Plan for increased activity and possible discharge tomorrow.

## 2024-02-05 NOTE — PROGRESS NOTES
A&O pleasant, cooperative. BiPAP overnight. 1-3L in bed while awake, RA while sitting up in wheelchair. Up SBA walker to bathroom. Loose stools after senna and miralax, held this HS. R midline, unit collect. Turn and reposition. C/o R leg burning if in one position for too long, relief with turning and movement. Appears to have slept well overnight. Pt anticipates discharge today.

## 2024-02-05 NOTE — PROGRESS NOTES
Care Management Follow Up    Length of Stay (days): 9    Expected Discharge Date: 02/05/2024     Concerns to be Addressed: discharge planning     Patient plan of care discussed at interdisciplinary rounds: Yes    Anticipated Discharge Disposition: Home Care     Anticipated Discharge Services: None  Anticipated Discharge DME: Walker, Bipap, home oxygen    Patient/family educated on Medicare website which has current facility and service quality ratings:    Education Provided on the Discharge Plan: Yes  Patient/Family in Agreement with the Plan: yes    Referrals Placed by CM/SW: Homecare  Private pay costs discussed: Not applicable    Additional Information:  Per discussion with bedside nurse, pt using Bipap at hospital with FiO2 45%.  Order for home BiPap without oxygen at discharge.  Spoke with Ivelisse at [facility] [phone] regarding oxygen at discharge for Bipap and discussed current settings here while in hospital.  Per [facility], pt needs new overnight oximetry to qualify for home oxygen at night with Bipap.  Previous overnight oximetry testing done more than 48hr from discharge, has to be less than 48 hrs to meet criteria for insurance coverage.  Per Ivelisse, pt has to drop below 88% on room air during oximetry test to qualify for home oxygen.     Updated hospitalist with need for new overnight oximetry to qualify for home oxygen with Bipap at night.  Hospitalist to order overnight oximetry, pt will stay tonight for testing.  Discharge held due to need for new test.    Rosibel Haider RN, BS  Care Coordinator  [email or web address]

## 2024-02-05 NOTE — PROGRESS NOTES
Care Management Discharge Note    Discharge Date: 02/05/2024       Discharge Disposition: Home with Home Care    Discharge Services: Home care RN, PT and OT with [facility]    Discharge DME: Walker, BiPap    Discharge Transportation: family or friend will provide (Pt typically drives)    Private pay costs discussed: Not applicable    Does the patient's insurance plan have a 3 day qualifying hospital stay waiver?  No    PAS Confirmation Code:    Patient/family educated on Medicare website which has current facility and service quality ratings:      Education Provided on the Discharge Plan: Yes  Persons Notified of Discharge Plans: pt, bedside RN, hospitalist, [facility]  Patient/Family in Agreement with the Plan: yes    Handoff Referral Completed: Yes    Additional Information:  Pt discharging to home with [facility] for home care RN, PT and OT-discharge orders sent via Epic fax to agency.    Bipap being delivered to hospital this afternoon at 1pm and pt spouse to be here for delivery and education from [facility].    Rosibel Haider RN, BS  Care Coordinator  [email or web address]

## 2024-02-06 ENCOUNTER — APPOINTMENT (OUTPATIENT)
Dept: OCCUPATIONAL THERAPY | Facility: CLINIC | Age: 44
End: 2024-02-06
Payer: COMMERCIAL

## 2024-02-06 ENCOUNTER — TELEPHONE (OUTPATIENT)
Dept: OBGYN | Facility: CLINIC | Age: 44
End: 2024-02-06
Payer: COMMERCIAL

## 2024-02-06 ENCOUNTER — APPOINTMENT (OUTPATIENT)
Dept: PHYSICAL THERAPY | Facility: CLINIC | Age: 44
End: 2024-02-06
Payer: COMMERCIAL

## 2024-02-06 VITALS
DIASTOLIC BLOOD PRESSURE: 51 MMHG | WEIGHT: 293 LBS | HEIGHT: 64 IN | BODY MASS INDEX: 50.02 KG/M2 | RESPIRATION RATE: 18 BRPM | OXYGEN SATURATION: 96 % | TEMPERATURE: 97.4 F | SYSTOLIC BLOOD PRESSURE: 96 MMHG | HEART RATE: 62 BPM

## 2024-02-06 LAB
ERYTHROCYTE [DISTWIDTH] IN BLOOD BY AUTOMATED COUNT: 16.1 % (ref 10–15)
HCT VFR BLD AUTO: 28 % (ref 35–47)
HGB BLD-MCNC: 8.8 G/DL (ref 11.7–15.7)
MCH RBC QN AUTO: 31.9 PG (ref 26.5–33)
MCHC RBC AUTO-ENTMCNC: 31.4 G/DL (ref 31.5–36.5)
MCV RBC AUTO: 101 FL (ref 78–100)
PLATELET # BLD AUTO: 150 10E3/UL (ref 150–450)
POTASSIUM SERPL-SCNC: 3.8 MMOL/L (ref 3.4–5.3)
RBC # BLD AUTO: 2.76 10E6/UL (ref 3.8–5.2)
WBC # BLD AUTO: 4.7 10E3/UL (ref 4–11)

## 2024-02-06 PROCEDURE — 97535 SELF CARE MNGMENT TRAINING: CPT | Mod: GO

## 2024-02-06 PROCEDURE — 85027 COMPLETE CBC AUTOMATED: CPT | Performed by: HOSPITALIST

## 2024-02-06 PROCEDURE — 250N000013 HC RX MED GY IP 250 OP 250 PS 637: Performed by: HOSPITALIST

## 2024-02-06 PROCEDURE — 99239 HOSP IP/OBS DSCHRG MGMT >30: CPT | Performed by: INTERNAL MEDICINE

## 2024-02-06 PROCEDURE — 94762 N-INVAS EAR/PLS OXIMTRY CONT: CPT

## 2024-02-06 PROCEDURE — 250N000011 HC RX IP 250 OP 636: Performed by: HOSPITALIST

## 2024-02-06 PROCEDURE — 97530 THERAPEUTIC ACTIVITIES: CPT | Mod: GP

## 2024-02-06 PROCEDURE — 999N000157 HC STATISTIC RCP TIME EA 10 MIN

## 2024-02-06 PROCEDURE — 84132 ASSAY OF SERUM POTASSIUM: CPT | Performed by: INTERNAL MEDICINE

## 2024-02-06 PROCEDURE — 97116 GAIT TRAINING THERAPY: CPT | Mod: GP

## 2024-02-06 RX ADMIN — IBUPROFEN 400 MG: 400 TABLET ORAL at 08:23

## 2024-02-06 RX ADMIN — FOLIC ACID 1 MG: 1 TABLET ORAL at 08:23

## 2024-02-06 RX ADMIN — ENOXAPARIN SODIUM 40 MG: 40 INJECTION SUBCUTANEOUS at 08:23

## 2024-02-06 RX ADMIN — LEVOTHYROXINE SODIUM 250 MCG: 100 TABLET ORAL at 08:23

## 2024-02-06 ASSESSMENT — ACTIVITIES OF DAILY LIVING (ADL)
ADLS_ACUITY_SCORE: 29

## 2024-02-06 NOTE — PLAN OF CARE
Physical Therapy Discharge Summary    Reason for therapy discharge:    Discharged to home with home therapy.    Progress towards therapy goal(s). See goals on Care Plan in [facility] electronic health record for goal details.  Goals met    Therapy recommendation(s):    Continued therapy is recommended.  Rationale/Recommendations:  To further increase independence with mobility.

## 2024-02-06 NOTE — PROGRESS NOTES
I started oximetry study on the patient while on RA. Her sats dropped to mid 70s.  The nurse put her on BIPAP 20/8 20 45%.    She spent the rest of the night on BIPAP.    Néstor Hyde, RT  2/6/2024

## 2024-02-06 NOTE — TELEPHONE ENCOUNTER
Called pt and left another message informing of tomorrow's clinic visit cancelled w Dr Salazar - let us know message rec'd.    Neva Villa RN on 2/6/2024 at 4:13 PM

## 2024-02-06 NOTE — PLAN OF CARE
A&OX4, RA, BP soft, complained of right thigh pain while sitting on the wheelchair, better when laying down in bed. Up with SBA with walker. Tele- SR, regular diet. Midline in place.    Events overnight.  Oximetry study overnight, desated to 78% on RA, pt was asleep. BIPAP in overnight

## 2024-02-06 NOTE — TELEPHONE ENCOUNTER
----- Message from Lexi Salazar MD sent at 2/5/2024  7:28 PM CST -----  Patient has an appointment with me this Wednesday and is totally overdue for everything, wasn't taking her thyroid meds, her weight has gotten severely out of control, depression, etc and she stated when called that I'm the only doctor that she'll see.    I was willing to certainly at least see her to try to get some things in place and back on track, but she just had an admission for encephalopathy and NSTEMI from o2 deprivation and SVT and just discharged. Her TSH was 145!!!!!    I think her most recent issues are so significant and SO far out of any possible scope of mine, that it isn't appropriate for me to be her first f/up appointment 2 days after all of this.  She absolutely needs to see a PCP, STAT, and they need to be the ones to see her and establish care within the week of her d/c    Once those issues are managed and under someones care, then anything gyn related or even mood related, I am happy to see her for down the road. But now that this very serious event just occurred I can't remotely be her first outpt visit!!    Can we call her and let her know that and  Tell her I am ABSOLUTELY not abandoning her and will see her but in a few weeks, but I need her to do the first few outpt appts with an internal med person b/c her questions and needs on Wednesday after such a huge event are going to be beyond even remotely what I can handle for her as the first line    She can do a visit in a month or two after some of this other stuff settles down    AJ        Called pt and left a detailed voice message informing we are cancelling 2/7/24 appointment w Dr Salazar as she feels her issues have become more than a GYN should manage. She wishes the patient well and to continue f/up w a PCP going forward.  Noted that pt is in patient at this time.    Neva Villa, RN on 2/6/2024 at 9:04 AM

## 2024-02-06 NOTE — PROGRESS NOTES
Oximetry study started at 2245.  Pt desated below 88% on RA a few minutes after the study began, pt is still awake  2303: Pt desated to 78% on RA, sleeping, placed on BIPAP ~ 2315

## 2024-02-06 NOTE — PROGRESS NOTES
I certify that this patient, Yodit Harvey has been under my care (or a nurse practitioner or physican's assistant working with me). This is the face-to-face encounter for oxygen medical necessity.      At the time of this encounter supplemental oxygen is reasonable and necessary and is expected to improve the patient's condition in a home setting.       Patient has continued oxygen desaturation due to Chronic Respiratory Failure with Hypoxia J96.11.    If portability is ordered, is the patient mobile within the home? Yes    SHELIA Conroy MD  Hospitalist  [phone]

## 2024-02-06 NOTE — PLAN OF CARE
Goal Outcome Evaluation:         Recd discharge order, pt has bipap, home O2 will deliver to pt's home. Given walker for home use. Midline and 2 other PIV discontinued. Pt/ have good understanding of discharge instructions. Discharge to home with  and all belongings.

## 2024-02-06 NOTE — PROGRESS NOTES
Care Management Discharge Note    Discharge Date: 02/06/2024       Discharge Disposition: Home Care    Discharge Services: None    Discharge DME: Walker    Discharge Transportation: family or friend will provide (Pt typically drives)    Private pay costs discussed: Not applicable    Does the patient's insurance plan have a 3 day qualifying hospital stay waiver?  No    PAS Confirmation Code:    Patient/family educated on Medicare website which has current facility and service quality ratings:      Education Provided on the Discharge Plan: Yes  Persons Notified of Discharge Plans: patient, bedside RN  Patient/Family in Agreement with the Plan: yes    Handoff Referral Completed: no    Additional Information:  Pt to discharge home today with [facility] home[facility].  Writer arranged follow up with new PCP and sleep medicine.  Appointments added to AVS.  [facility] to deliver home oxygen equipment to patient's home once she arrives there.    Yamileth Lazo RN Care Coordinator  [facility]  [phone]

## 2024-02-07 ENCOUNTER — PATIENT OUTREACH (OUTPATIENT)
Dept: CARE COORDINATION | Facility: CLINIC | Age: 44
End: 2024-02-07

## 2024-02-07 ENCOUNTER — OFFICE VISIT (OUTPATIENT)
Dept: INTERNAL MEDICINE | Facility: CLINIC | Age: 44
End: 2024-02-07
Payer: COMMERCIAL

## 2024-02-07 VITALS
SYSTOLIC BLOOD PRESSURE: 106 MMHG | BODY MASS INDEX: 50.02 KG/M2 | TEMPERATURE: 97.9 F | HEART RATE: 73 BPM | OXYGEN SATURATION: 98 % | DIASTOLIC BLOOD PRESSURE: 57 MMHG | HEIGHT: 64 IN | WEIGHT: 293 LBS

## 2024-02-07 DIAGNOSIS — E66.01 MORBID OBESITY WITH BMI OF 50.0-59.9, ADULT (H): ICD-10-CM

## 2024-02-07 DIAGNOSIS — Z11.59 NEED FOR HEPATITIS C SCREENING TEST: ICD-10-CM

## 2024-02-07 DIAGNOSIS — Z12.11 SCREEN FOR COLON CANCER: ICD-10-CM

## 2024-02-07 DIAGNOSIS — Z13.220 SCREENING FOR LIPID DISORDERS: ICD-10-CM

## 2024-02-07 DIAGNOSIS — J30.81 ALLERGIC RHINITIS DUE TO ANIMALS: ICD-10-CM

## 2024-02-07 DIAGNOSIS — Z12.4 CERVICAL CANCER SCREENING: ICD-10-CM

## 2024-02-07 DIAGNOSIS — E03.9 ACQUIRED HYPOTHYROIDISM: Primary | ICD-10-CM

## 2024-02-07 DIAGNOSIS — G47.33 OBSTRUCTIVE SLEEP APNEA SYNDROME: ICD-10-CM

## 2024-02-07 DIAGNOSIS — Z12.31 VISIT FOR SCREENING MAMMOGRAM: ICD-10-CM

## 2024-02-07 DIAGNOSIS — I89.0 LYMPHEDEMA: ICD-10-CM

## 2024-02-07 PROCEDURE — 90471 IMMUNIZATION ADMIN: CPT | Performed by: INTERNAL MEDICINE

## 2024-02-07 PROCEDURE — 90715 TDAP VACCINE 7 YRS/> IM: CPT | Performed by: INTERNAL MEDICINE

## 2024-02-07 PROCEDURE — 90472 IMMUNIZATION ADMIN EACH ADD: CPT | Performed by: INTERNAL MEDICINE

## 2024-02-07 PROCEDURE — 99495 TRANSJ CARE MGMT MOD F2F 14D: CPT | Mod: 25 | Performed by: INTERNAL MEDICINE

## 2024-02-07 PROCEDURE — 90686 IIV4 VACC NO PRSV 0.5 ML IM: CPT | Performed by: INTERNAL MEDICINE

## 2024-02-07 RX ORDER — CETIRIZINE HYDROCHLORIDE 10 MG/1
10 TABLET ORAL DAILY
Qty: 90 TABLET | Refills: 3 | Status: SHIPPED | OUTPATIENT
Start: 2024-02-07

## 2024-02-07 ASSESSMENT — ACTIVITIES OF DAILY LIVING (ADL): DEPENDENT_IADLS:: CLEANING;COOKING;LAUNDRY;SHOPPING;MEAL PREPARATION;TRANSPORTATION

## 2024-02-07 NOTE — PROGRESS NOTES
"  Assessment & Plan     Acquired hypothyroidism  Continue current dose of levothyroxine  Lab check ordered for 1 month    Obstructive sleep apnea syndrome  Continue with BiPAP and follow-up with sleep clinic    Lymphedema  Referral placed to physical therapy    Allergic rhinitis due to animals  Zyrtec refilled    Screen for colon cancer  Referral placed for colonoscopy    Need for hepatitis C screening test  Ordered mammogram ordered    Visit for screening mammogram  Mammogram ordered    Cervical cancer screening  Patient to follow-up with gynecology for Pap smear    Screening for lipid disorders  Due for lipid panel, order placed, patient can do with labs next month      Morbid obesity with BMI of 50.0-59.9, adult (H)  Weight loss clinic referral placed            MED REC REQUIRED  Post Medication Reconciliation Status: discharge medications reconciled, continue medications without change  BMI  Estimated body mass index is 78.44 kg/m  as calculated from the following:    Height as of this encounter: 1.626 m (5' 4\").    Weight as of this encounter: 207.3 kg (457 lb).   Weight management plan: Patient referred to endocrine and/or weight management specialty      Follow-up in 1 month with labs.    Kristen Monsivais is a 43 year old, presenting for the following health issues:  Hospital F/U    Weight loss clinic   Lipedema on the legs   Home care visits- after an initial follow up   Zyrtec- allergy to dogs     Hospital Follow-up Visit:    Hospital/Nursing Home/IP Rehab Facility: [facility]  Date of Admission: 1/27/2024  Date of Discharge: 2/06/2024  Reason(s) for Admission: Hypoxia     Was your hospitalization related to COVID-19? No   Problems taking medications regularly:  None  Medication changes since discharge: None  Problems adhering to non-medication therapy:  None    Summary of hospitalization:  [facility] discharge summary reviewed  Diagnostic Tests/Treatments " "reviewed.  Follow up needed: Physical therapy  Other Healthcare Providers Involved in Patient s Care:         Homecare  Update since discharge: improved.         Plan of care communicated with patient and family           Patient was admitted to [facility].  Had SVT on admission.  Also had uncontrolled hypothyroidism with TSH of 144.  Patient has a longstanding history of hypothyroidism since 2003 but stopped taking her medications 1 year ago.  She was also found to have hypercapnia and is now using BiPAP.  She has a follow-up scheduled with sleep medicine clinic.    Had COVID in December 2023.    Has home care pending to start.    Has follow-up with gynecology for Pap smear.    Requesting refill prescription for Zyrtec to treat allergy to dogs.          Review of Systems  Constitutional, HEENT, cardiovascular, pulmonary, GI, , musculoskeletal, neuro, skin, endocrine and psych systems are negative, except as otherwise noted.      Objective    /57   Pulse 73   Temp 97.9  F (36.6  C) (Temporal)   Ht 1.626 m (5' 4\")   Wt (!) 207.3 kg (457 lb)   SpO2 98%   BMI 78.44 kg/m    Body mass index is 78.44 kg/m .  Physical Exam   GENERAL: alert and no distress  NECK: no adenopathy, no asymmetry, masses, or scars  RESP: lungs clear to auscultation - no rales, rhonchi or wheezes  CV: regular rate and rhythm, normal S1 S2, no S3 or S4, no murmur, click or rub, no peripheral edema  ABDOMEN: soft, nontender, no hepatosplenomegaly, no masses and bowel sounds normal  MS: Bilateral lymphedema lower extremities                Signed Electronically by: Brianda Richardson MD    "

## 2024-02-07 NOTE — PROGRESS NOTES
Clinic Care Coordination Contact  [facility]/Voicemail    Clinical Data: Care Coordinator Outreach    Outreach Documentation Number of Outreach Attempt   2/7/2024   9:25 AM 1     Left message on patient's voicemail with call back information and requested return call.    Plan: Care Coordinator will try to reach patient again in 1-2 business days.     Karon Reyes RN, BSN, PHN  Primary Care / Care Coordinator   [facility]  E-mail [email or web address]   [phone]

## 2024-02-07 NOTE — PLAN OF CARE
Occupational Therapy Discharge Summary    Reason for therapy discharge:    Discharged to home with home therapy.    Progress towards therapy goal(s). See goals on Care Plan in [facility] electronic health record for goal details.  Goals partially met.  Barriers to achieving goals:   discharge from facility.    Therapy recommendation(s):    Continued therapy is recommended.  Rationale/Recommendations:  Pt lives in a house with her family and typically independent in some ADLs and IADLs. Pt limited by decreased activity tolerance and IND in I/ADLs. Home with assist in I/ADLs (shower transfers, home management tasks, shopping) and home OT to address safety and IND in I/ADLs.

## 2024-02-08 ENCOUNTER — PATIENT OUTREACH (OUTPATIENT)
Dept: CARE COORDINATION | Facility: CLINIC | Age: 44
End: 2024-02-08

## 2024-02-08 ASSESSMENT — ACTIVITIES OF DAILY LIVING (ADL): DEPENDENT_IADLS:: CLEANING;COOKING;LAUNDRY;SHOPPING;MEAL PREPARATION;TRANSPORTATION

## 2024-02-08 NOTE — PROGRESS NOTES
Clinic Care Coordination Contact  [facility]/Voicemail    Clinical Data: Care Coordinator Outreach    Outreach Documentation Number of Outreach Attempt   2/7/2024   9:25 AM 1   2/8/2024  12:33 PM 2     Left message on patient's voicemail with call back information and requested return call.    Plan: Care Coordinator will send care coordination introduction letter with care coordinator contact information and explanation of care coordination services [facility]. Care Coordinator will do no further outreaches at this time.     Karon Reyes RN, BSN, PHN  Primary Care / Care Coordinator   [facility]  E-mail [email or web address]   [phone]

## 2024-02-08 NOTE — LETTER
[facility] CARE COORDINATION  [address] [facility] [address]    February 8, 2024    Yodit Durant Da  [address] [facility] [address]      Dear Yodit,    I am a clinic care coordinator who works with Brianda Richardson MD with the [facility]. I recently tried to call and was unable to reach you. Below is a description of clinic care coordination and how I can further assist you.       The clinic care coordination team is made up of a registered nurse, , financial resource worker and community health worker who understand the health care system. The goal of clinic care coordination is to help you manage your health and improve access to the health care system. Our team works alongside your provider to assist you in determining your health and social needs. We can help you obtain health care and community resources, providing you with necessary information and education. We can work with you through any barriers and develop a care plan that helps coordinate and strengthen the communication between you and your care team.  Our services are voluntary and are offered without charge to you personally.    Please feel free to contact me with any questions or concerns regarding care coordination and what we can offer.      We are focused on providing you with the highest-quality healthcare experience possible.    Sincerely,      Karon Reyes RN, BSN, PHN  Primary Care / Care Coordinator   [facility]  E-mail [email or web address]   [phone]

## 2024-02-09 ENCOUNTER — PATIENT OUTREACH (OUTPATIENT)
Dept: INTERNAL MEDICINE | Facility: CLINIC | Age: 44
End: 2024-02-09
Payer: COMMERCIAL

## 2024-02-09 NOTE — TELEPHONE ENCOUNTER
What type of discharge? Inpatient  Risk of Hospital admission or ED visit: 36.7%  Is a TCM episode required? No  When should the patient follow up with PCP? 14 days of discharge.    Justice L. Phoenix, RN  [facility]

## 2024-02-19 ENCOUNTER — TELEPHONE (OUTPATIENT)
Dept: GASTROENTEROLOGY | Facility: CLINIC | Age: 44
End: 2024-02-19
Payer: COMMERCIAL

## 2024-02-19 ENCOUNTER — TELEPHONE (OUTPATIENT)
Dept: OBGYN | Facility: CLINIC | Age: 44
End: 2024-02-19
Payer: COMMERCIAL

## 2024-02-19 NOTE — TELEPHONE ENCOUNTER
Pre Assessment RN Review    Focused Assessments    Cardiac Review    Patient was admitted on 1/27/24 for resp failure and symptomatic SVT. With two doses of adenosine, she converted to sinus rhythm. She also had a elevated troponin, EF was 55-60%. Required no cardiac follow up post discharge.       Scheduling Status & Recommendations    Delay scheduling, awaiting clinical input. Message sent to ordering provider as cardiology is not listed.         Nancy Emanuel RN on 2/19/2024 at 10:34 AM

## 2024-02-19 NOTE — TELEPHONE ENCOUNTER
This is not an OBGYN situation after her recent hospitalization. Has not been seen by Dr Salazar since 2019    Called Meli and she states order for home care was from Dr Lexi Salazar  This could not be as she has not been seen by Dr Salazar since 2019.

## 2024-02-19 NOTE — TELEPHONE ENCOUNTER
[facility] Call Center    Phone Message    May a detailed message be left on voicemail: yes     Reason for Call: Other: Meli from [facility] called requesting visit notes, med list, current allergies, and dx's. Meli can be contacted at [phone] or faxed info to [phone].      Action Taken: Message routed to:  Other: [facility]    Travel Screening: Not Applicable

## 2024-02-19 NOTE — TELEPHONE ENCOUNTER
"Endoscopy Scheduling Screen    Have you had a positive Covid test in the last 14 days?  No    Are you active on MyChart?   Yes    What insurance is in the chart?  Other:  [employer]    Ordering/Referring Provider:     KEILA LITTLE      (If ordering provider performs procedure, schedule with ordering provider unless otherwise instructed. )    BMI: Estimated body mass index is 78.44 kg/m  as calculated from the following:    Height as of 2/7/24: 1.626 m (5' 4\").    Weight as of 2/7/24: 207.3 kg (457 lb).     Sedation Ordered  moderate sedation.   If patient BMI > 50 do not schedule in [facility].    If patient BMI > 45 do not schedule at [facility].    Are you taking methadone or Suboxone?  No    Have you had difficulties, pain, or discomfort during past endoscopy procedures?  No    Are you taking any prescription medications for pain 3 or more times per week?   NO - No RN review required.    Do you have a history of malignant hyperthermia or adverse reaction to anesthesia?  No    (Females) Are you currently pregnant?   No     Have you been diagnosed or told you have pulmonary hypertension?   No    Do you have an LVAD?  No    Have you been told you have moderate to severe sleep apnea?  Yes (RN Review required for scheduling unless scheduling in Hospital.)    Have you been told you have COPD, asthma, or any other lung disease?  Yes     What breathing problems do you have?  Sleep apnea- bipap      Do you use home oxygen?  Yes (Hospital Only) - while sleeping.    Have your breathing problems required an ED visit or hospitalization in the last year?  Yes (RN Review required for scheduling unless scheduling in Hospital.)    Do you have any heart conditions?  No     Have you ever had an organ transplant?   No    Have you ever had or are you awaiting a heart or lung transplant?   No    Have you had a stroke or transient ischemic attack (TIA aka \"mini stroke\" in the last 6 months?   No    Have you been diagnosed with or been told you have " "cirrhosis of the liver?   No    Are you currently on dialysis?   No    Do you need assistance transferring?   Yes (Hospital Only)    BMI: Estimated body mass index is 78.44 kg/m  as calculated from the following:    Height as of 2/7/24: 1.626 m (5' 4\").    Weight as of 2/7/24: 207.3 kg (457 lb).     Is patients BMI > 40 and scheduling location [facility]?  No    Do you take an injectable medication for weight loss or diabetes (excluding insulin)?  No    Do you take the medication Naltrexone?  No    Do you take blood thinners?  No       Prep   Are you currently on dialysis or do you have chronic kidney disease?  No    Do you have a diagnosis of diabetes?  No    Do you have a diagnosis of cystic fibrosis (CF)?  No    On a regular basis do you go 3 -5 days between bowel movements?  No    BMI > 40?  Yes (Extended Prep)    Preferred Pharmacy:    [facility] - [place], [place] - [address] [place] [address]  Phone: [phone] Fax: [phone]    Final Scheduling Details  RN request hold on scheduling. Will call pt back to schedule when approval is given.  "

## 2024-02-20 NOTE — TELEPHONE ENCOUNTER
Reached out to Brianda Richardson to delay in scheduling.     Brianda Richardson, Nancy Thompson RN  Can be delayed for 6 months.  Thank You.       Nancy Emanuel, RN on 2/20/2024 at 8:42 AM

## 2024-02-20 NOTE — TELEPHONE ENCOUNTER
Spoke to Meli to follow-up. Meli states she needs patient's F2F from Home Care referral that will include current medication list, allergies, and diagnoses.     Per chart review, patient has Home Care Referral placed 2/5/24 that includes F2F information. Re-faxed this referral to [facility] [place] at [phone].

## 2024-02-22 ENCOUNTER — MEDICAL CORRESPONDENCE (OUTPATIENT)
Dept: HEALTH INFORMATION MANAGEMENT | Facility: CLINIC | Age: 44
End: 2024-02-22

## 2024-03-11 ENCOUNTER — MYC MEDICAL ADVICE (OUTPATIENT)
Dept: INTERNAL MEDICINE | Facility: CLINIC | Age: 44
End: 2024-03-11
Payer: COMMERCIAL

## 2024-03-14 ENCOUNTER — LAB (OUTPATIENT)
Dept: LAB | Facility: CLINIC | Age: 44
End: 2024-03-14
Payer: COMMERCIAL

## 2024-03-14 DIAGNOSIS — Z13.220 SCREENING FOR LIPID DISORDERS: ICD-10-CM

## 2024-03-14 DIAGNOSIS — E03.9 ACQUIRED HYPOTHYROIDISM: ICD-10-CM

## 2024-03-14 DIAGNOSIS — Z11.59 NEED FOR HEPATITIS C SCREENING TEST: ICD-10-CM

## 2024-03-14 PROCEDURE — 84443 ASSAY THYROID STIM HORMONE: CPT

## 2024-03-14 PROCEDURE — 80061 LIPID PANEL: CPT

## 2024-03-14 PROCEDURE — 84439 ASSAY OF FREE THYROXINE: CPT

## 2024-03-14 PROCEDURE — 86803 HEPATITIS C AB TEST: CPT

## 2024-03-14 PROCEDURE — 36415 COLL VENOUS BLD VENIPUNCTURE: CPT

## 2024-03-15 LAB
CHOLEST SERPL-MCNC: 143 MG/DL
FASTING STATUS PATIENT QL REPORTED: YES
HCV AB SERPL QL IA: NONREACTIVE
HDLC SERPL-MCNC: 38 MG/DL
LDLC SERPL CALC-MCNC: 77 MG/DL
NONHDLC SERPL-MCNC: 105 MG/DL
T4 FREE SERPL-MCNC: 1.88 NG/DL (ref 0.9–1.7)
TRIGL SERPL-MCNC: 140 MG/DL
TSH SERPL DL<=0.005 MIU/L-ACNC: 10.2 UIU/ML (ref 0.3–4.2)

## 2024-03-19 ENCOUNTER — MYC MEDICAL ADVICE (OUTPATIENT)
Dept: INTERNAL MEDICINE | Facility: CLINIC | Age: 44
End: 2024-03-19
Payer: COMMERCIAL

## 2024-03-19 DIAGNOSIS — E03.9 HYPOTHYROIDISM, UNSPECIFIED TYPE: ICD-10-CM

## 2024-03-19 NOTE — PROGRESS NOTES
Yodit is a 43 year old  female who presents for annual exam.     Besides routine health maintenance,  she would like to discuss referral for lipidemia .    HPI:  The patient's PCP is Dr. Brianda Richardson MD.      Patient presents for an annual visit w/  and hasn't seen me since     Patient with known hypothyroidism for years and has had several times over the years where she stops taking her meds and has been profoundly hypothyroid.    This just recently happened again b/c hadn't been in for so long.  Had gained a lot of weight, mobility was very hard, was depressed and admittedly not wanting to be seen b/c knew her weight would be brought up and didn't want to face it, so just stopped taking meds and doing any visits    Then had  voice changes and she started slurring her words.confusion, syncope, palpitations and finally brought to the ER.  Found to have SVT and had adenosine cardioversion and then either a 2nd adenosine one or an electrical one and went to [facility]. Had wanted to d/c her from ER but ended up admitted and in the ICU with hypoxia and hypercapenia and multiple electrolyte issues, etc  After that hosp she is now established with a new PCP and just saw her very recently since so many high risk complex med issues to manage.    Her thyroid is much better with her last TSH at 10.2. unsure why Ft4 is being tested since she's on levoxyl now, and patient and  asked that since it was a bit high. She is currently on 250mcg of levoxyl and has planned f/up for labs with PCP in near future again    Patient had menorrhagia for years and got a mirena  and has had complete amenorrhea with that which is great.  Patient thought her mom had cervical cancer(in notes states ovarian) but now has found out from her sisters that it was likely uterine. She  at age 48 and so Yodit was very young and didn't know the details. Patient's mom was definitely overweight from what she recalls but  doesn't think it was that bad. Unsure of any other details though.     Patient inquired about a referral for her lymphedema. D/t weight she has severe edema of her legs with inner thigh hard, hot, very painful areas that she feels are getting worse since discharge vs better.  Feels that this is the biggest impact on her QOL as can't walk hardly at all. Using a wheelchair if has to leave the house and only very short distances around the house and wondering if something can be done for that so she can try to move more.  Receives home nurse cares and anmol being an ER nurse is very helpful to her as well    Uses oxygen only at night and doing Bipap at night,  would like to switch to CPAP only once weight is down.    Reported occasional diarrhea but not significant.  Didn't mention it initially, until exam, but is having vaginal discharge and itching. No abx now but thinks she maybe had some during her hospitalization??    Patient's weight has also gotten out of hand. BMI is 78. We've discussed bariatric surgery on many occasions in the past, but she felt that that isn't for her and really didn't want to do that b/c the idea scared her. States she doesn't eat much as it is but also last time we discussed this her BMI was 50 and she did not have this level of impact on her QOL     Anmol just recently started on Wegovy and has lost 50# in just a few weeks. They are on same insurance and his is approved despite not being Diabetic.  Had labs done and lipids and A1c/glucose were all normal      GYNECOLOGIC HISTORY:    No LMP recorded. (Menstrual status: IUD).    Her current contraception method is: IUD.  She  reports that she has never smoked. She has never used smokeless tobacco.    Patient is sexually active.  STD testing offered?  Declined  Last PHQ-9 score on record =       3/20/2024     2:52 PM   PHQ-9 SCORE   PHQ-9 Total Score 7     Last GAD7 score on record =       3/20/2024     2:52 PM   MELINA-7 SCORE   Total Score 2      Alcohol Score = 1    HEALTH MAINTENANCE:  Cholesterol:   Recent Labs   Lab Test 24  1007 16  1014   CHOL 143 215*   HDL 38* 51   LDL 77 142*   TRIG 140 110     Last Mammo:  19 , Result: Normal, Next Mammo: Today     Pap:   Lab Results   Component Value Date    PAP NIL 2019     Colonoscopy:  NA, Result: Not applicable, Next Colonoscopy: 43 years.  Dexa:  NA    Health maintenance updated:  Yes     HISTORY:  OB History    Para Term  AB Living   3 2 2 0 1 2   SAB IAB Ectopic Multiple Live Births   1 0 0 0 2      # Outcome Date GA Lbr Misael/2nd Weight Sex Delivery Anes PTL Lv   3 Term 11 39w0d  4.423 kg (9 lb 12 oz) F CS-Unspec EPI  PAULY      Birth Comments: Followed and delivered by [facility]. Difficult getting spinal in due to pt's weight so an epidural was placed.      Name: Ebonie   2 Term 12/10/08 39w0d  3.6 kg (7 lb 15 oz) M CS-Unspec Spinal  PAULY      Birth Comments: Followed and delivered by Martin.  Primary c/s done for suspected macrosomia      Name: Flaquito   1 SAB 06     SAB         Birth Comments: chemical pregnancy       Patient Active Problem List   Diagnosis    Morbid obesity with BMI of 70 and over, adult (H)    Family history of ovarian cancer    Hypothyroidism    Presence of intrauterine contraceptive device (IUD)    Genital herpes    SVT (supraventricular tachycardia)    Hypoxia    Obstructive sleep apnea syndrome    Allergic rhinitis due to animals    Lymphedema associated with obesity     Past Surgical History:   Procedure Laterality Date     SECTION  12/10/08, 11    HC TOOTH EXTRACTION W/FORCEP      IR PICC PLACEMENT > 5 YRS OF AGE  2024    ZZHC CREATE EARDRUM OPENING,GEN ANESTH        Social History     Tobacco Use    Smoking status: Never    Smokeless tobacco: Never   Substance Use Topics    Alcohol use: Yes     Alcohol/week: 0.0 standard drinks of alcohol      Problem (# of Occurrences) Relation (Name,Age of Onset)    Thyroid Disease  "(1) Sister    Breast Cancer (1) Maternal Grandmother: 2013    Uterine Cancer (1) Mother (48):  age 48              Current Outpatient Medications   Medication Sig    cetirizine (ZYRTEC) 10 MG tablet Take 1 tablet (10 mg) by mouth daily    fluconazole (DIFLUCAN) 150 MG tablet Take 1 tablet (150 mg) by mouth every 3 days for 3 doses    levonorgestrel (MIRENA) 20 MCG/24HR IUD 1 each by Intrauterine route once - inserted 19 FCFW    levothyroxine (SYNTHROID/LEVOTHROID) 125 MCG tablet Take 2 tablets (250 mcg) by mouth every morning (before breakfast)     No current facility-administered medications for this visit.     Allergies   Allergen Reactions    Dogs Itching and Rash     And Cats- eyes swelling and watery        Past medical, surgical, social and family histories were reviewed and updated in [facility].    EXAM:  /88   Ht 1.626 m (5' 4\")   Wt (!) 207.3 kg (457 lb)   Breastfeeding No   BMI 78.44 kg/m     BMI: Body mass index is 78.44 kg/m .    PHYSICAL EXAM:  Constitutional:   Appearance: Well nourished, well developed, alert, in no acute distress  Neck:  Lymph Nodes:  No lymphadenopathy present    Thyroid:  Gland size normal, nontender, no nodules or masses present  on palpation  Chest:  Respiratory Effort:  Breathing unlabored, CTAB  Cardiovascular:    Heart: Auscultation:  Regular rate, normal rhythm, no murmurs present  Breasts: APPEARANCE NORMAL AND SYMMETRIC, NO LESIONS NO PUCKERING OR RASHES, NO MASSES OR ABNORMALITIES PALPATED, NO KATIE NOTED  Gastrointestinal:   Abdominal Examination:  Abdomen nontender to palpation, tone normal without rigidity or guarding, no masses present, umbilicus without lesions   Liver and Spleen:  No hepatomegaly present, liver nontender to palpation    Hernias:  No hernias present  Lymphatic: Lymph Nodes:  No other lymphadenopathy present  Skin:  General Inspection:  HAS NO SI OF INTERTRIGO OR RASHES NOTED IN AXILLA, GROIN, UNDER PANUS  HER LEG EDEMA IS SIGNIFICANT " AND HAS LARGE POCKETS OF HARD, WARM, RED EDEMA IN HER INNER THIGHS FROM IT.    Neurologic:    Mental Status:  Oriented X3.  Normal strength and tone, sensory exam                grossly normal, mentation intact and speech normal.    Psychiatric:   Mentation appears normal and affect normal/bright.         Pelvic Exam:  External Genitalia:     Normal appearance for age, no discharge present, no tenderness present, no inflammatory lesions present, color normal  Vagina:    Normal vaginal vault without central or paravaginal defects, NOTED CLUMPY WHITE DISCHARGE PRESENT C/W YEAST,   Bladder:     Nontender to palpation  Urethra:   Urethral Body:  Urethra palpation normal, urethra structural support normal   Urethral Meatus:  No erythema or lesions present  Cervix:     Appearance healthy, BUT COULD ONLY SEE THE VERY ANTERIOR PORTION OF HER CERVIX ENOUGH TO COLLECT PAP B/C EVEN LONG PLASTIC SPECULUM WAS TOO WIDE FOR HER COMFORT AND LENGTH WAS JUST SHORT BUT THE METAL LONG SPECULUM WAS TOO WIDE no lesions present, nontender to palpation, no bleeding present, string present  Uterus:     Nontender to palpation, no masses present, position anteflexed, mobility: normal  Adnexa:     No adnexal tenderness present, no adnexal masses present  Perineum:     Perineum within normal limits, no evidence of trauma, no rashes or skin lesions present  Anus:     Anus within normal limits, no hemorrhoids present  Inguinal Lymph Nodes:     No lymphadenopathy present  Pubic Hair:     Normal pubic hair distribution for age  Genitalia and Groin:     No rashes present, no lesions present, no areas of discoloration, no masses present    COUNSELING:   Reviewed preventive health counseling, as reflected in patient instructions       Regular exercise       Healthy diet/nutrition       Immunizations  Declined: Covid-19 due to Concerns about side effects/safety           Contraception    BMI: Body mass index is 78.44 kg/m .  Weight management plan:  Discussed healthy diet and exercise guidelines Patient was referred to their PCP to discuss a diet and exercise plan.    ASSESSMENT:  43 year old female with satisfactory annual exam.    ICD-10-CM    1. Encounter for gynecological examination without abnormal finding  Z01.419 Pap thin layer screen with HPV - recommended age 30 - 65 years     HPV Hold (Lab Only)      2. Presence of intrauterine contraceptive device (IUD)  Z97.5       3. Morbid obesity with BMI of 70 and over, adult (H)  E66.01     Z68.45       4. Yeast vaginitis  B37.31 fluconazole (DIFLUCAN) 150 MG tablet      5. Lymphedema associated with obesity  I89.0     E66.9       6. Acquired hypothyroidism  E03.9       7. Obstructive sleep apnea syndrome  G47.33       8. SVT (supraventricular tachycardia)  I47.10           PLAN:    Pap/hpv today and is/was right on schedule for it  Can follow routine ASCCP guidelines as no risk factors for HPV and not specifically immune suppressed, and comfort at current BMI is difficult for her, however should still do annual breast pelvic exams at the least since today's speculum exam was somewhat limited    Mammo today    Fasting labs and management of all of her complex medical issues deferred to PCP.   She will likely continue to address many of the things that haven't been addressed yet b/c of her acute needs s/p hospitalization.    Do not feel that free t4 needs to be monitored when she is on levothyroxine, and only TSH is warranted, so would not worry about the very slightly high ft4  Her TSH is definitely getting much more optimized and can see where she's at in another 4-6 weeks when due for retesting    Discussed that I can place referral to lymphedema clinic to work on wrapping and may need to discuss lasix with her PCP  Likely too cautious to start that already b/c did have some acute renal compromise with slightly high creatinine on last check  Offered a repeat creat/GFR so that if normal could contact PCP with  that and consider some lasix but they were short on time and so deferred that repeat to their PCP appt    Spent a while discussing her weight and all the many impacts on her life and QOL and now profoundly so on mental health that lead to noncompliance with thyroid meds which would have been an easy intervention to avoid all the came after it, and patient admits that that is very true and was feeling desperate and depressed and scared to be seen b/c of weight    Discussed weight management options and feel very strongly that she should ask her PCP for Rx for GLP-1  Given alen has coverage for BMI >40 presumably, since he's not diabetic, this is a very good sign that she'd qualify as well and could get her life back  She is now much more in agreement with this b/c admits that what happened really scared her and got her back in a head space of getting healthy and living her life and being here for her kids.  Have referred them to discuss this with PCP asap    From a gyn standpoint she has amenorrhea with her mirena IUD which is great  It is now 5 yrs since she had it placed and is now FDA approved for 8 yrs for contraception but if not needed for that then could leave it even longer if bleeding patterns are favorable.  Is typically s.a and has been but of course given weight and recent health issues, have not been so she is good for at least 3 more years on this  Could feel the strings on bimanual but couldn't quite see them with speculum    Patient has obvious yeast infection on exam so will Rx diflucan x3 doses, every other day for that and if sx not improving, or do and then recur, can contact me    Commended her for taking the first steps and committing to now doing the right things, staying compliant with appts and meds, and working on the health changes that will return her QOL to her.    30 minutes in addition to the routine annual preventative exam,  were spent on direct management of the patient's other  medical issues as above as well as chart review including: imaging, lab work, previous visit notes by this provider, and other provider notes, as well as chart completion on the same DOS      Lexi Salazar MD

## 2024-03-20 ENCOUNTER — ANCILLARY PROCEDURE (OUTPATIENT)
Dept: MAMMOGRAPHY | Facility: CLINIC | Age: 44
End: 2024-03-20
Payer: COMMERCIAL

## 2024-03-20 ENCOUNTER — OFFICE VISIT (OUTPATIENT)
Dept: OBGYN | Facility: CLINIC | Age: 44
End: 2024-03-20
Payer: COMMERCIAL

## 2024-03-20 VITALS
DIASTOLIC BLOOD PRESSURE: 88 MMHG | HEIGHT: 64 IN | SYSTOLIC BLOOD PRESSURE: 126 MMHG | BODY MASS INDEX: 50.02 KG/M2 | WEIGHT: 293 LBS

## 2024-03-20 DIAGNOSIS — I89.0 LYMPHEDEMA ASSOCIATED WITH OBESITY: ICD-10-CM

## 2024-03-20 DIAGNOSIS — E03.9 ACQUIRED HYPOTHYROIDISM: ICD-10-CM

## 2024-03-20 DIAGNOSIS — G47.33 OBSTRUCTIVE SLEEP APNEA SYNDROME: ICD-10-CM

## 2024-03-20 DIAGNOSIS — B37.31 YEAST VAGINITIS: ICD-10-CM

## 2024-03-20 DIAGNOSIS — Z12.31 VISIT FOR SCREENING MAMMOGRAM: ICD-10-CM

## 2024-03-20 DIAGNOSIS — E66.9 LYMPHEDEMA ASSOCIATED WITH OBESITY: ICD-10-CM

## 2024-03-20 DIAGNOSIS — E66.01 MORBID OBESITY WITH BMI OF 70 AND OVER, ADULT (H): ICD-10-CM

## 2024-03-20 DIAGNOSIS — Z97.5 PRESENCE OF INTRAUTERINE CONTRACEPTIVE DEVICE (IUD): ICD-10-CM

## 2024-03-20 DIAGNOSIS — Z01.419 ENCOUNTER FOR GYNECOLOGICAL EXAMINATION WITHOUT ABNORMAL FINDING: Primary | ICD-10-CM

## 2024-03-20 DIAGNOSIS — I47.10 SVT (SUPRAVENTRICULAR TACHYCARDIA) (H): ICD-10-CM

## 2024-03-20 PROCEDURE — 99386 PREV VISIT NEW AGE 40-64: CPT | Performed by: OBSTETRICS & GYNECOLOGY

## 2024-03-20 PROCEDURE — 99214 OFFICE O/P EST MOD 30 MIN: CPT | Mod: 25 | Performed by: OBSTETRICS & GYNECOLOGY

## 2024-03-20 PROCEDURE — G0145 SCR C/V CYTO,THINLAYER,RESCR: HCPCS | Performed by: OBSTETRICS & GYNECOLOGY

## 2024-03-20 PROCEDURE — 77063 BREAST TOMOSYNTHESIS BI: CPT | Mod: TC | Performed by: RADIOLOGY

## 2024-03-20 PROCEDURE — 87624 HPV HI-RISK TYP POOLED RSLT: CPT | Performed by: OBSTETRICS & GYNECOLOGY

## 2024-03-20 PROCEDURE — 77067 SCR MAMMO BI INCL CAD: CPT | Mod: TC | Performed by: RADIOLOGY

## 2024-03-20 RX ORDER — LEVOTHYROXINE SODIUM 125 UG/1
250 TABLET ORAL
Qty: 60 TABLET | Refills: 1 | Status: SHIPPED | OUTPATIENT
Start: 2024-03-20 | End: 2024-08-03

## 2024-03-20 RX ORDER — FLUCONAZOLE 150 MG/1
150 TABLET ORAL
Qty: 3 TABLET | Refills: 0 | Status: SHIPPED | OUTPATIENT
Start: 2024-03-20 | End: 2024-03-27

## 2024-03-20 ASSESSMENT — ANXIETY QUESTIONNAIRES
GAD7 TOTAL SCORE: 2
3. WORRYING TOO MUCH ABOUT DIFFERENT THINGS: SEVERAL DAYS
2. NOT BEING ABLE TO STOP OR CONTROL WORRYING: SEVERAL DAYS
5. BEING SO RESTLESS THAT IT IS HARD TO SIT STILL: NOT AT ALL
1. FEELING NERVOUS, ANXIOUS, OR ON EDGE: NOT AT ALL
7. FEELING AFRAID AS IF SOMETHING AWFUL MIGHT HAPPEN: NOT AT ALL
6. BECOMING EASILY ANNOYED OR IRRITABLE: NOT AT ALL
IF YOU CHECKED OFF ANY PROBLEMS ON THIS QUESTIONNAIRE, HOW DIFFICULT HAVE THESE PROBLEMS MADE IT FOR YOU TO DO YOUR WORK, TAKE CARE OF THINGS AT HOME, OR GET ALONG WITH OTHER PEOPLE: NOT DIFFICULT AT ALL
GAD7 TOTAL SCORE: 2

## 2024-03-20 ASSESSMENT — PATIENT HEALTH QUESTIONNAIRE - PHQ9
5. POOR APPETITE OR OVEREATING: NOT AT ALL
SUM OF ALL RESPONSES TO PHQ QUESTIONS 1-9: 7

## 2024-03-22 DIAGNOSIS — E03.9 ACQUIRED HYPOTHYROIDISM: Primary | ICD-10-CM

## 2024-03-24 PROBLEM — I89.0 LYMPHEDEMA ASSOCIATED WITH OBESITY: Status: ACTIVE | Noted: 2024-03-24

## 2024-03-24 PROBLEM — E66.01 MORBID OBESITY WITH BMI OF 50.0-59.9, ADULT (H): Status: RESOLVED | Noted: 2019-02-07 | Resolved: 2024-03-24

## 2024-03-24 PROBLEM — E66.9 LYMPHEDEMA ASSOCIATED WITH OBESITY: Status: ACTIVE | Noted: 2024-03-24

## 2024-03-25 LAB
BKR LAB AP GYN ADEQUACY: NORMAL
BKR LAB AP GYN INTERPRETATION: NORMAL
BKR LAB AP HPV REFLEX: NORMAL
BKR LAB AP PREVIOUS ABNORMAL: NORMAL
PATH REPORT.COMMENTS IMP SPEC: NORMAL
PATH REPORT.COMMENTS IMP SPEC: NORMAL
PATH REPORT.RELEVANT HX SPEC: NORMAL

## 2024-03-27 LAB
HUMAN PAPILLOMA VIRUS 16 DNA: NEGATIVE
HUMAN PAPILLOMA VIRUS 18 DNA: NEGATIVE
HUMAN PAPILLOMA VIRUS FINAL DIAGNOSIS: NORMAL
HUMAN PAPILLOMA VIRUS OTHER HR: NEGATIVE

## 2024-04-16 ENCOUNTER — MYC MEDICAL ADVICE (OUTPATIENT)
Dept: INTERNAL MEDICINE | Facility: CLINIC | Age: 44
End: 2024-04-16
Payer: COMMERCIAL

## 2024-04-16 ENCOUNTER — MEDICAL CORRESPONDENCE (OUTPATIENT)
Dept: HEALTH INFORMATION MANAGEMENT | Facility: CLINIC | Age: 44
End: 2024-04-16
Payer: COMMERCIAL

## 2024-04-16 DIAGNOSIS — I89.0 LYMPHEDEMA ASSOCIATED WITH OBESITY: Primary | ICD-10-CM

## 2024-04-16 DIAGNOSIS — E66.9 LYMPHEDEMA ASSOCIATED WITH OBESITY: Primary | ICD-10-CM

## 2024-04-19 DIAGNOSIS — Z53.9 DIAGNOSIS NOT YET DEFINED: Primary | ICD-10-CM

## 2024-04-19 PROCEDURE — G0179 MD RECERTIFICATION HHA PT: HCPCS | Performed by: INTERNAL MEDICINE

## 2024-04-26 ENCOUNTER — MEDICAL CORRESPONDENCE (OUTPATIENT)
Dept: HEALTH INFORMATION MANAGEMENT | Facility: CLINIC | Age: 44
End: 2024-04-26
Payer: COMMERCIAL

## 2024-04-29 ENCOUNTER — THERAPY VISIT (OUTPATIENT)
Dept: PHYSICAL THERAPY | Facility: CLINIC | Age: 44
End: 2024-04-29
Attending: INTERNAL MEDICINE
Payer: COMMERCIAL

## 2024-04-29 DIAGNOSIS — E66.9 LYMPHEDEMA ASSOCIATED WITH OBESITY: ICD-10-CM

## 2024-04-29 DIAGNOSIS — I89.0 LYMPHEDEMA ASSOCIATED WITH OBESITY: ICD-10-CM

## 2024-04-29 PROCEDURE — 97162 PT EVAL MOD COMPLEX 30 MIN: CPT | Mod: GP | Performed by: PHYSICAL THERAPIST

## 2024-04-29 PROCEDURE — 97535 SELF CARE MNGMENT TRAINING: CPT | Mod: GP | Performed by: PHYSICAL THERAPIST

## 2024-04-29 NOTE — PROGRESS NOTES
PHYSICAL THERAPY EVALUATION  Type of Visit: Evaluation    See electronic medical record for Abuse and Falls Screening details.    Subjective       Presenting condition or subjective complaint: Lymphedema, feet used to swell when traveling in the care or flying but not constant for a few years.  Then been maybe since last summer that the thighs have been getting hard.    Date of onset: 21    Relevant medical history: Overweight; Significant weakness; Sleep disorder like apnea; Thyroid problems. Recent hospitalization 24-24 for ARF, metabolic encephalopathy, SVTs (resoved, causing falls), hx of sacral fracture and spinal back pain.  Dates & types of surgery: Ear tubes,  x2    Prior diagnostic imaging/testing results:       Prior therapy history for the same diagnosis, illness or injury: Yes  , tried some compression socks in the past but couldn't get a good fit.     Prior Level of Function  Transfers: Assistive equipment  Ambulation: Assistive equipment  ADL: Assistive equipment and person, Showering at MIL walk in shower  IADL: Laundry, Meal preparation, Medication management  Sleeping in a recliner chair currently.  PT at home 2x/week    Living Environment  Social support: With a significant other or spouse   Type of home: House; Basement   Stairs to enter the home: Yes 3 Is there a railing: No   Ramp: No   Stairs inside the home: Yes 12 Is there a railing: Yes   Help at home: Self Cares (home health aide/personal care attendant, family, etc)  Equipment owned: Straight Cane; Walker with wheels; Standard wheelchair     Employment: No    Hobbies/Interests:      Patient goals for therapy: Care for lymphedema, help reduction of lymphedema, walk, drive, reduce pain    Pain assessment: Pain present Right side in the lobe.  Occasionally throbbing does get blisters on th lobes.     Objective       EDEMA EVALUATION  Additional history:  Body part affected by edema: Both legs and feet  If cancer  related, treatment:    If not cancer related, problems with veins or cause of swelling:    Distance able to walk: Not far, household distance  Time able to stand: A couple minutes  Sensation problems in hands/feet: No    Edema etiology: Insidious, immobility, obesity, lipedema    FUNCTIONAL SCALES  LLIS: 59    Cognitive Status Examination  Orientation: Oriented to person, place and time   Level of Consciousness: Alert  Follows Commands and Answers Questions: 100% of the time  Personal Safety and Judgement: Intact  Memory: Intact    EDEMA  Skin Condition: Dryness, Intact, Non-pitting, Pitting.  Pt with bilaterally LE and upper arm swelling.  Arms skin not observed.  Thigh with bluish hue and cool on anterior aspect with nodular feel as in lipedema..  Medial thigh pitting edema with fibrosis and lobules present with deep skin fold between. Fold above L knee with red bumps, pt will use Nystatin powder at home. CLT recommended use of interdry as well in the folds.  Mild pitting at feet but per pt and spouse, large dorsal hump present by end of day.  Non-pittng in the lower legs at time of evaluation.  Hyperkeratosis noted on the feet.  Scar:  no observed  Stemmer Sign: +  Ulceration: No, pt reports weeping in at times from blistered skin opening    GIRTH MEASUREMENTS: Refer to separate girth measurement flowsheet.     VOLUME LE 10-50cm above heel  Right LE (mL) 46498.69   Left LE (mL) 22224.92   LE Volume Comparison RLE volume greater than LLE volume     RANGE OF MOTION:  impaired due to body habitus  STRENGTH:  functionall deficits based on demands, pt needing help to lift legs onto mat and trunk to sit up to side  POSTURE: WFL  PALPATION: Tender in back of medial thighs  ACTIVITIES OF DAILY LIVING: Spouse assists as needed, pt home during the day often alone  BED MOBILITY: Max Assist  TRANSFERS: Independent  GAIT/LOCOMOTION: Independent with WW, only a few feet observed,side steps without device  BALANCE:  not  assessed today  SENSATION: LE Sensation WNL  COORDINATION: WNL  MUSCLE TONE: WNL    Assessment & Plan   CLINICAL IMPRESSIONS  Medical Diagnosis: Lymphedema associated with obesity    Treatment Diagnosis: Lymphedema   Impression/Assessment: Patient is a 43 year old female with edema  complaints.  The following significant findings have been identified: Pain, Decreased ROM/flexibility, Decreased strength, Edema, Impaired gait, Impaired muscle performance, and Decreased activity tolerance. These impairments interfere with their ability to perform self care tasks, work tasks, recreational activities, household chores, driving , household mobility, and community mobility as compared to previous level of function.     Clinical Decision Making (Complexity):  Clinical Presentation: Evolving/Changing  Clinical Presentation Rationale: based on medical and personal factors listed in PT evaluation  Clinical Decision Making (Complexity): Moderate complexity    PLAN OF CARE  Treatment Interventions:  Interventions: Manual Therapy, Therapeutic Activity, Therapeutic Exercise, Self-Care/Home Management, Gradient Compression Bandaging, Fit for garments    Long Term Goals     PT Goal 1  Goal Identifier: Lymphedema Home Program  Goal Description: Pt will be independent with drainage exercise, self massage, and skin care to best manage swelling to decrease symptoms (assist of spouse as needed but mostly independent)  Target Date: 07/27/24  PT Goal 2  Goal Identifier: Volume  Goal Description: Pt will have 10% reduction in B volume for improved tissue integrity, decreased risk of infection, improved mobility and improved fit of shoes.  Target Date: 08/26/24  PT Goal 3  Goal Identifier: LLIS  Goal Description: Pt will have at least 8 point reduction in score on subsequent assessment to reflect the MCID in impact of swelling on quality of life.  Goal Progress: initial: 59/72  Target Date: 10/25/24  PT Goal 4  Goal Identifier:  Maintenance  Goal Description: Pt will use compression garments as instructed AND home management tools/program for long term management of chronic condition to prevent tissue fibrosis, infection, wound and other secondary sequelae  Target Date: 10/25/24  PT Goal 5  Goal Identifier: Mobility  Goal Description: Pt will be able to ambulate 300' with appropriate assistive device and drive care herself for independence  in community and getting to appointments  Target Date: 10/25/24      Frequency of Treatment: start 1x/week until garments, then 3x/week as able  for up to 8 weeks, taper as clinically appropriate  Duration of Treatment: 6mo    Recommended Referrals to Other Professionals:   Education Assessment:   Learner/Method: Patient;Significant Other (Spouse, Anmol, is ED RN and very supportive)  Education Comments: etiology, plan    Risks and benefits of evaluation/treatment have been explained.   Patient/Family/caregiver agrees with Plan of Care.     Evaluation Time:     PT Eval, Moderate Complexity Minutes (82252): 30       Signing Clinician: Gema Palmer, PT

## 2024-05-02 ASSESSMENT — SLEEP AND FATIGUE QUESTIONNAIRES
HOW LIKELY ARE YOU TO NOD OFF OR FALL ASLEEP WHILE SITTING AND TALKING TO SOMEONE: WOULD NEVER DOZE
HOW LIKELY ARE YOU TO NOD OFF OR FALL ASLEEP WHILE LYING DOWN TO REST IN THE AFTERNOON WHEN CIRCUMSTANCES PERMIT: SLIGHT CHANCE OF DOZING
HOW LIKELY ARE YOU TO NOD OFF OR FALL ASLEEP WHILE SITTING QUIETLY AFTER LUNCH WITHOUT ALCOHOL: WOULD NEVER DOZE
HOW LIKELY ARE YOU TO NOD OFF OR FALL ASLEEP WHILE SITTING AND READING: SLIGHT CHANCE OF DOZING
HOW LIKELY ARE YOU TO NOD OFF OR FALL ASLEEP WHILE WATCHING TV: SLIGHT CHANCE OF DOZING
HOW LIKELY ARE YOU TO NOD OFF OR FALL ASLEEP WHILE SITTING INACTIVE IN A PUBLIC PLACE: WOULD NEVER DOZE
HOW LIKELY ARE YOU TO NOD OFF OR FALL ASLEEP IN A CAR, WHILE STOPPED FOR A FEW MINUTES IN TRAFFIC: WOULD NEVER DOZE
HOW LIKELY ARE YOU TO NOD OFF OR FALL ASLEEP WHEN YOU ARE A PASSENGER IN A CAR FOR AN HOUR WITHOUT A BREAK: SLIGHT CHANCE OF DOZING

## 2024-05-02 NOTE — PROGRESS NOTES
Outpatient Sleep Medicine Consultation:      Name: Yodit Harvey MRN# [id number]   Age: 43 year old YOB: 1980     Date of Consultation: May 2, 2024  Consultation is requested by: No referring provider defined for this encounter. No ref. provider found  Primary care provider: Brianda Richardson       Reason for Sleep Consult:     Yodit Harvey is sent by No ref. provider found for a sleep consultation regarding recent hospitalization with respiratory failure, was discharged with BiPAP    Patient s Reason for visit  Yodit Harvey main reason for visit: Use of bi-pap  Patient states problem(s) started: When i was in hospital end if January.  Yodit Harvey's goals for this visit: To continue use of bi-pap           Assessment and Plan:     Summary Sleep Diagnoses and Recommendations:  (E66.2) Obesity hypoventilation syndrome (H)  (primary encounter diagnosis), (E66.01,  Z68.45) Morbid obesity with BMI of 70 and over, adult (H)  Comment: Delores presents to establish care for obesity hypoventilation syndrome.  She was admitted to the emergency department in January with acute hypoxic and hypercapnic respiratory failure and encephalopathy.  She also had untreated hypothyroidism with a TSH of 144.  Her BMI is 78 (wt 457#).  She had oximetry that showed periods of sustained O2 baseline in the 60s and an ABG with the CO2 of 59.  That came down to 43 on BiPAP.  She was discharged on spontaneous BiPAP 20/8 cm and 2 L/min supplemental O2 at night (although she has it set at 3 L/min).  She is back on levothyroxine and her TSH is down to 10.  Her weight does not appear to be changed since January.  She is receiving treatment for lymphedema.  She is using BiPAP nightly, meeting compliance goals, and benefiting from use. She is sleeping much better with BiPAP and no longer falling asleep inadvertently in the daytime.  Her download shows no significant apnea.  Her residual AHI is 3/hr (half central  apnea).  She sleeps in a recliner at 15-30 degrees.  She does not tolerate sleeping flat.  Plan: Comprehensive Sleep Study, Overnight oximetry study        Continue BiPAP 20/8 cm with 3 LPM supplemental O2 bled in.  I will have her do overnight oximetry on this treatment to verify no residual hypoxemia or evidence of apnea.   I have placed an order for titration PSG starting on the current settings and reducing the O2 or BiPAP pressure settings as tolerated. She will be working on weight loss until then.  A prescription was written for new supplies. We reviewed recommendations for cleaning and replacing supplies.         (G47.00) Insomnia, unspecified type  Comment: She has difficulty falling asleep 1-2 nights per week.  Her bedtime can vary between midnight and 4 AM based on the download.  She may get up between 7:30 AM and afternoon.  Plan: We reviewed the concepts of circadian and homeostatic sleep drives.  She was encouraged to pick an 8-hour window of time to be in bed for sleep, trying to keep a consistent bedtime and wake time every day.  Avoid naps and sleeping in.       Comorbid Diagnoses:  BMI 78, lymphedema, hypothyroidism    Summary Counseling:    Sleep Testing Reviewed  Obstructive Sleep Apnea Reviewed  Complications of Untreated Sleep Apnea Reviewed      Patient will follow up as soon as possible after sleep study.  If there is a significant delay between the sleep study and the follow-up, we will review the study results over MyChart and initiate treatment before the follow up.  Bennett Goltz, PA-C      Total time spent reviewing medical records, history and physical examination, review of previous testing and interpretation as well as documentation on this date: 72 min    CC: No ref. provider found          History of Present Illness:   She presents with her .     Yodit Harvey presents for further evaluation/management of suspected JL and obesity hypoventilation syndrome. She was  admitted with hypoxic and hypercapnic respiratory failure in January. She had stopped taking her thyroid medication and her TSH was up to 145. ABG was 59 (when off of BiPAP, 48 on BiPAP). She has had qualifying PFT. She is on BIPAP S () 20/8 cm since her hospitalization for the diagnosis of obesity hypoventilation syndrome. She was also prescribed 3 lpm O2 at night.    She has been doing much better with BiPAP. Prior to BiPAP, she was having a lot of daytime drowsiness. She took a lot of naps and would doze off while talking to people, any time of day. Her  would observe loud snoring and pauses in breathing. With BiPAP, she is awake all day and rarely takes a nap. She sleeps through the night soundly.    She is using an Kate full face mask. She rarely gets mask leak. She tightens it and it resolves. She is tolerating the pressure well. No snoring is observed with the mask. She will have a dry mouth when she wakes and having a little water takes care of it. The water chamber rarely runs out of water. No condensation in the hose or mask. She has not been waking with headaches much anymore. It has largely subsided.    Her weight appears to be unchanged since her hospital stay in January. She is doing PT for lymphedema. She is watching her diet.   Her daytime SpO2 on room air is usually 93-96%.    The compliance data shows that the patient used the BiPAP for 30/30 nights, 97% of nights for >4 hours.  The 95th% pressure is 20/8 cm.  The 95th% leak is 1.3 lpm.  The average nightly usage is 7:21.  The average AHI is 3/hr (1.3/hr central, 1.5/hr obstructive).     Past Sleep Evaluations:  Oximetry results were obtained for the date of 2/2/24.  The patient was on room air.  The study showed a valid recording time of 6:35 with a 4% desaturation index of 32.8/hr.  The mean oxygen saturation was 89.8% and the lowest SpO2 was 12%.  The patient spent 1 hour 33 minutes below 88% SpO2. Her O2 baseline was in the 60's  for sustained periods.     SLEEP-WAKE SCHEDULE:     Work/School Days: Patient goes to school/work: No   Usually gets into bed at 11/12 midnight, sometimes 2-4 AM  Takes patient about Not too long to fall asleep  Has trouble falling asleep 1-2 nights nights per week  Wakes up in the middle of the night 1 times.  Wakes up due to Snorting self awake;External stimuli (bed partner, pets, noise, etc);Use the bathroom  She has trouble falling back asleep 0-1 times a week.   It usually takes Depends to get back to sleep  Patient is usually up at 7:30/8 sometimes as late as noon.  Uses alarm: Yes    Weekends/Non-work Days/All Other Days:  Usually gets into bed at 12   Takes patient about Depends to fall asleep  Patient is usually up at Depends  Uses alarm: Yes    Sleep Need  Patient gets  5-8 hours sleep on average   Patient thinks she needs about 8 sleep    Yodit Harvey prefers to sleep in this position(s): Recliner elevation varies, 15-30 degrees roughly. She does not like to sleep flat. She used to only sleep prone, but that was difficult with weight gain.  Patient states they do the following activities in bed: Other  She will watch TV and be on her electronics in the recliner. Turns them off for sleep.     Naps  Patient takes a purposeful nap Not often only when get a couple hours of sleep at night times a week and naps are usually 1 hour in duration  She feels better after a nap: Yes  She dozes off unintentionally 0 days per week  Patient has had a driving accident or near-miss due to sleepiness/drowsiness: No      SLEEP DISRUPTIONS:    Breathing/Snoring  Patient snores:Yes  Other people complain about her snoring: Yes  Patient has been told she stops breathing in her sleep:Yes  She has issues with the following: Morning headaches;Morning mouth dryness. Morning headaches have largely subsided. no nocturnal heartburn or reflux. no nasal congestion at night.    Movement:  Patient gets pain, discomfort, with an urge  to move:  Yes sometimes her tailbone hurts or gets sciatica on the right. She then has to adjust her position. No restless legs symptoms.  It happens when she is resting:  No  It happens more at night:  No  Patient has been told she kicks her legs at night:  No     Behaviors in Sleep:  Yodit Harvey has experienced the following behaviors while sleeping:   Prior to BiPAP she would have panic/choke awakenings. She does not move in her sleep with BiPAP.   Pt denies bruxism, sleep talking, sleep walking, and dream enactment behavior. Pt denies sleep paralysis, hypnagogue and cataplexy.       Is there anything else you would like your sleep provider to know: Just need the oxygen to keep my levels up so they don't drop again      CAFFEINE AND OTHER SUBSTANCES:    Patient consumes caffeinated beverages per day:  1 20 oz Sunkist throughout the day, often with dinner  Last caffeine use is usually: Evening  List of any prescribed or over the counter stimulants that patient takes: None  List of any prescribed or over the counter sleep medication patient takes: None  List of previous sleep medications that patient has tried: None  Patient drinks alcohol to help them sleep: No  Patient drinks alcohol near bedtime: No    Family History:  Patient has a family member been diagnosed with a sleep disorder: Yes  Sleep apnea -not blood related     Social History:  She is not working currently   She lives with her  and 2 kids (15 and 12).        SCALES:    EPWORTH SLEEPINESS SCALE         5/2/2024     8:28 PM    [place] Sleepiness Scale ( KAYLEE Rankin  [id number]<br>ESS - [place]/English - Final version - 21 Nov 07 - [facility] Research [place].)   Sitting and reading Slight chance of dozing   Watching TV Slight chance of dozing   Sitting, inactive in a public place (e.g. a theatre or a meeting) Would never doze   As a passenger in a car for an hour without a break Slight chance of dozing   Lying down to rest in the afternoon when  circumstances permit Slight chance of dozing   Sitting and talking to someone Would never doze   Sitting quietly after a lunch without alcohol Would never doze   In a car, while stopped for a few minutes in traffic Would never doze   Lincoln Score (MC) 4   Lincoln Score (Sleep) 4         INSOMNIA SEVERITY INDEX (PRITI)          5/2/2024     8:14 PM   Insomnia Severity Index (PRITI)   Difficulty falling asleep 1   Difficulty staying asleep 1   Problems waking up too early 0   How SATISFIED/DISSATISFIED are you with your CURRENT sleep pattern? 1   How NOTICEABLE to others do you think your sleep problem is in terms of impairing the quality of your life? 0   How WORRIED/DISTRESSED are you about your current sleep problem? 1   To what extent do you consider your sleep problem to INTERFERE with your daily functioning (e.g. daytime fatigue, mood, ability to function at work/daily chores, concentration, memory, mood, etc.) CURRENTLY? 0   PRITI Total Score 4       Guidelines for Scoring/Interpretation:  Total score categories:  0-7 = No clinically significant insomnia   8-14 = Subthreshold insomnia   15-21 = Clinical insomnia (moderate severity)  22-28 = Clinical insomnia (severe)  Used via courtesy of www.Dealstreetth.va.gov with permission from Deep Oviedo PhD., [facility]      STOP BANG         5/2/2024     8:29 PM   STOP BANG Questionnaire (  2008, the American Society of Anesthesiologists, Inc. [employer]   1. Snoring - Do you snore loudly (louder than talking or loud enough to be heard through closed doors)? Yes   2. Tired - Do you often feel tired, fatigued, or sleepy during daytime? Yes   3. Observed - Has anyone observed you stop breathing during your sleep? Yes   4. Blood pressure - Do you have or are you being treated for high blood pressure? No   5. BMI - BMI more than 35 kg/m2? Yes   6. Age - Age over 50 yr old? No   7. Neck circumference - Neck circumference greater than 40 cm? Yes  "  8. Gender - Gender male? No   STOP BANG Score (MC): 6 (High risk of JL)         GAD7        3/20/2024     2:52 PM   MELINA-7    1. Feeling nervous, anxious, or on edge 0   2. Not being able to stop or control worrying 1   3. Worrying too much about different things 1   4. Trouble relaxing 0   5. Being so restless that it is hard to sit still 0   6. Becoming easily annoyed or irritable 0   7. Feeling afraid, as if something awful might happen 0   MELINA-7 Total Score 2   If you checked any problems, how difficult have they made it for you to do your work, take care of things at home, or get along with other people? Not difficult at all         CAGE-AID         No data to display                CAGE-AID reprinted with permission from the [place] Medical Journal, ANGELO Song. and CRIS Summers, \"Conjoint screening questionnaires for alcohol and drug abuse\" [place] Medical Journal 94: 135-140, 1995.      PATIENT HEALTH QUESTIONNAIRE-9 (PHQ - 9)        3/20/2024     2:52 PM   PHQ-9 ([employer])   1.  Little interest or pleasure in doing things 1   2.  Feeling down, depressed, or hopeless 0   3.  Trouble falling or staying asleep, or sleeping too much 0   4.  Feeling tired or having little energy 2   5.  Poor appetite or overeating 0   6.  Feeling bad about yourself - or that you are a failure or have let yourself or your family down 1   7.  Trouble concentrating on things, such as reading the newspaper or watching television 0   8.  Moving or speaking so slowly that other people could have noticed. Or the opposite - being so fidgety or restless that you have been moving around a lot more than usual 3   9.  Thoughts that you would be better off dead, or of hurting yourself in some way 0   PHQ-9 Total Score 7   If you checked off any problems, how difficult have these problems made it for you to do your work, take care of things at home, or get along with other people? Somewhat difficult   6.  Feeling bad about yourself 1   7.  " Trouble concentrating 0   8.  Moving slowly or restless 3   9.  Suicidal or self-harm thoughts 0   Difficulty at work, home, or with people Somewhat difficult       Developed by Luis Judd, Maribel Conroy, Juancarlos Hayes and colleagues, with an educational abril from [employer]. No permission required to reproduce, translate, display or distribute.        Allergies:    Allergies   Allergen Reactions    Dogs Itching and Rash     And Cats- eyes swelling and watery        Medications:    Current Outpatient Medications   Medication Sig Dispense Refill    cetirizine (ZYRTEC) 10 MG tablet Take 1 tablet (10 mg) by mouth daily 90 tablet 3    levonorgestrel (MIRENA) 20 MCG/24HR IUD 1 each by Intrauterine route once - inserted 2/18/19 [facility]      levothyroxine (SYNTHROID/LEVOTHROID) 125 MCG tablet Take 2 tablets (250 mcg) by mouth every morning (before breakfast) 60 tablet 1       Problem List:  Patient Active Problem List    Diagnosis Date Noted    Lymphedema associated with obesity 03/24/2024     Priority: Medium    Obstructive sleep apnea syndrome 02/07/2024     Priority: Medium    Allergic rhinitis due to animals 02/07/2024     Priority: Medium    SVT (supraventricular tachycardia) (H24) 01/27/2024     Priority: Medium    Hypoxia 01/27/2024     Priority: Medium    Genital herpes 05/15/2019     Priority: Medium     first outbreak.  with known cold sores for years and PCR pos for type I and antibody just barely positive at first outbreak with known exposure about 3-4 weeks prior to her outbreak      Presence of intrauterine contraceptive device (IUD) 02/18/2019     Priority: Medium     mirena placed by [facility]/S confirmation at [facility] immediately after with correct placement      Morbid obesity with BMI of 70 and over, adult (H) 03/03/2016     Priority: Medium    Family history of ovarian cancer 03/03/2016     Priority: Medium    Hypothyroidism 03/03/2016     Priority: Medium        Past Medical/Surgical  History:  Past Medical History:   Diagnosis Date    Family history of uterine cancer     mom- age 46/48?    Genital herpes 05/15/2019    first outbreak.  with known cold sores for years and PCR pos for type I and antibody just barely positive at first outbreak with known exposure about 3-4 weeks prior to her outbreak    Hypothyroidism, unspecified hypothyroidism type [E03.9] 2016    Lipidemia     Menorrhagia with regular cycle 2016    Morbid obesity, unspecified obesity type (H) 2016    Obstructive sleep apnea syndrome 2024    Presence of intrauterine contraceptive device (IUD) 2019    mirena placed by [facility]/S confirmation at [facility] immediately after with correct placement    Sleep apnea      Past Surgical History:   Procedure Laterality Date     SECTION  12/10/08, 11    HC TOOTH EXTRACTION W/FORCEP      IR PICC PLACEMENT > 5 YRS OF AGE  2024    ZZHC CREATE EARDRUM OPENING,GEN ANESTH         Social History:  Social History     Socioeconomic History    Marital status:      Spouse name: Benjie    Number of children: 2    Years of education: Not on file    Highest education level: Not on file   Occupational History    Occupation: Advocate     Employer: [employer]     Comment: [employer]   Tobacco Use    Smoking status: Never    Smokeless tobacco: Never   Substance and Sexual Activity    Alcohol use: Yes     Alcohol/week: 0.0 standard drinks of alcohol    Drug use: No    Sexual activity: Yes     Partners: Male     Birth control/protection: Condom, I.U.D.     Comment: mirena   Other Topics Concern    Parent/sibling w/ CABG, MI or angioplasty before 65F 55M? Not Asked   Social History Narrative    Not on file     Social Determinants of Health     Financial Resource Strain: High Risk (2022)    [facility]    Financial  "Resource Strain     Difficulty of Paying Living Expenses: Not on file     Difficulty of Paying Living Expenses: Not on file   Food Insecurity: Not on file   Transportation Needs: Not on file   Physical Activity: Not on file   Stress: Not on file   Social Connections: Unknown (2023)    Received from [facility] [employer] [facility] [address], [facility]    Social Connections     Frequency of Communication with Friends and Family: Not on file   Interpersonal Safety: Not on file   Housing Stability: Not on file       Family History:  Family History   Problem Relation Age of Onset    Breast Cancer Maternal Grandmother         2013    Uterine Cancer Mother 48         age 48    Thyroid Disease Sister        Review of Systems:  A complete review of systems reviewed by me is negative with the exeption of what has been mentioned in the history of present illness.        Physical Examination:  Vitals: Ht 1.626 m (5' 4\")   BMI 78.44 kg/m    BMI= Body mass index is 78.44 kg/m .           GENERAL APPEARANCE: healthy, alert, no distress, and cooperative  EYES: Eyes grossly normal to inspection and wearing glasses  HENT: not well visualized over video  NECK: no asymmetry, masses, or scars  RESP: no apparent respiratory distress (retractions or difficulty speaking in full sentences), no audible wheeze or cough          Data: All pertinent previous laboratory data reviewed     Recent Labs   Lab Test 24  0545 24  0538 24  0515 24  0609 24  0551 24  1401 24  1117 24  0425   NA  --   --   --   --  139  --   --  140   POTASSIUM 3.8 3.8 3.8   < > 3.7  --    < > 3.4  3.3*   CHLORIDE  --   --   --   --  101  --   --  100   CO2  --   --   --   --  31*  --   --  30*   ANIONGAP  --   --   --   --  7  --   --  10   GLC  --   --   --   --  108* 89  --  89   BUN  --   --   --   --  21.8*  --   --  19.5   CR  --   --  0.99*  --  1.05*  --   --  " "1.05*   BECKY  --   --   --   --  9.3  --   --  9.0    < > = values in this interval not displayed.       Recent Labs   Lab Test 02/06/24  0545   WBC 4.7   RBC 2.76*   HGB 8.8*   HCT 28.0*   *   MCH 31.9   MCHC 31.4*   RDW 16.1*          Recent Labs   Lab Test 02/01/24  0425   PROTTOTAL 6.8   ALBUMIN 3.8   BILITOTAL 0.4   ALKPHOS 42   AST 33   ALT 19       TSH   Date Value   03/14/2024 10.20 uIU/mL (H)   01/27/2024 144.90 uIU/mL (H)   11/07/2019 128.85 mU/L (H)   07/12/2016 0.65 mU/L       No results found for: \"UAMP\", \"UBARB\", \"BENZODIAZEUR\", \"UCANN\", \"UCOC\", \"OPIT\", \"UPCP\"    Iron Sat Index   Date/Time Value Ref Range Status   01/27/2024 03:16 PM 16 15 - 46 % Final     Ferritin   Date/Time Value Ref Range Status   01/27/2024 03:16  6 - 175 ng/mL Final   03/03/2016 10:14 AM 9 (L) 12 - 150 ng/mL Final       pH Arterial (no units)   Date Value   02/03/2024 7.31 (L)   02/03/2024 7.36     pO2 Arterial (mm Hg)   Date Value   02/03/2024 114 (H)   02/03/2024 94     pCO2 Arterial (mm Hg)   Date Value   02/03/2024 59 (H)   02/03/2024 54 (H)     Bicarbonate Arterial (mmol/L)   Date Value   02/03/2024 30 (H)   02/03/2024 30 (H)     Base Excess/Deficit Arterial (mmol/L)   Date Value   02/03/2024 2.7   02/03/2024 4.1 (H)       @LABRCNTIPR(phv:4,pco2v:4,po2v:4,hco3v:4,evangelist:4,o2per:4)@    Echocardiology: No results found for this or any previous visit (from the past 4320 hour(s)).    Chest x-ray:   Chest XR,  PA & LAT 01/27/2024    Narrative  EXAM: XR CHEST 2 VIEWS  LOCATION: [facility]  DATE: 1/27/2024    INDICATION: SOB  COMPARISON: None.    Impression  IMPRESSION: Lateral view is underpenetrated due to body habitus.    Lungs are clear. No signs of pneumonia or failure. Heart is of normal size. No effusions.      Chest CT:   CT Chest Pulmonary Embolism w Contrast 01/27/2024    Narrative  EXAM: CT CHEST PULMONARY EMBOLISM W CONTRAST  LOCATION: [facility]  DATE: " "1/27/2024    INDICATION: Hypoxia.  COMPARISON: None.  TECHNIQUE: CT chest pulmonary angiogram during arterial phase injection of IV contrast. Multiplanar reformats and MIP reconstructions were performed. Dose reduction techniques were used.  CONTRAST: 83mL Isovue 370    FINDINGS:  ANGIOGRAM CHEST: Pulmonary arteries are normal caliber and negative for pulmonary emboli. Thoracic aorta is negative for dissection. No CT evidence of right heart strain.    LUNGS AND PLEURA: Mild dependent probable atelectasis versus less likely infiltrate. No effusions.    MEDIASTINUM/AXILLAE: No adenopathy. No aneurysm.    CORONARY ARTERY CALCIFICATION: None.    UPPER ABDOMEN: No acute findings.    MUSCULOSKELETAL: No frankly destructive bony lesions.    Impression  IMPRESSION:  1.  No pulmonary embolism demonstrated.  2.  Mild dependent probable atelectasis versus less likely infiltrate.      PFT: Most Recent [facility] Pulmonary Function Testing    No results found for: \"20001\"        Bennett Ezra Goltz, PA-C, [place] 5/2/2024          "

## 2024-05-03 ENCOUNTER — VIRTUAL VISIT (OUTPATIENT)
Dept: SLEEP MEDICINE | Facility: CLINIC | Age: 44
End: 2024-05-03
Payer: COMMERCIAL

## 2024-05-03 VITALS — HEIGHT: 64 IN | BODY MASS INDEX: 78.44 KG/M2

## 2024-05-03 DIAGNOSIS — E66.2 OBESITY HYPOVENTILATION SYNDROME (H): Primary | ICD-10-CM

## 2024-05-03 DIAGNOSIS — G47.00 INSOMNIA, UNSPECIFIED TYPE: ICD-10-CM

## 2024-05-03 DIAGNOSIS — E66.01 MORBID OBESITY WITH BMI OF 70 AND OVER, ADULT (H): ICD-10-CM

## 2024-05-03 PROCEDURE — 99205 OFFICE O/P NEW HI 60 MIN: CPT | Mod: 95 | Performed by: PHYSICIAN ASSISTANT

## 2024-05-03 ASSESSMENT — PAIN SCALES - GENERAL: PAINLEVEL: NO PAIN (0)

## 2024-05-03 NOTE — NURSING NOTE
Is the patient currently in the state of [place]? YES    Visit mode:VIDEO    If the visit is dropped, the patient can be reconnected by: VIDEO VISIT: Text to cell phone:   Telephone Information:   Mobile [phone]       Will anyone else be joining the visit? NO  (If patient encounters technical issues they should call [phone]    How would you like to obtain your AVS? MyChart    Are changes needed to the allergy or medication list? No    Are refills needed on medications prescribed by this physician? NO    Reason for visit: Consult    Karl ARANA

## 2024-05-03 NOTE — PROGRESS NOTES
Virtual Visit Details    Type of service:  Video Visit   Start Time: 1:01 PM   End Time: 2:03 PM    Originating Location (pt. Location): Home    Distant Location (provider location):  Off-site  Platform used for Video Visit: Kuldeep

## 2024-05-03 NOTE — Clinical Note
Ebonie, Can you please make sure the oximetry order gets sent to [facility]? Thanks.  Jose C

## 2024-05-03 NOTE — PATIENT INSTRUCTIONS
General recommendations for sleep problems (Insomnia)  Allow 2-4 weeks to see results     Establish a regular sleep schedule    Most people only need 7-8 hours of sleep.  Don't be in bed longer than you need     to sleep or you will end up spending more time awake in bed. This trains your    brain to think of the bed as a place to not sleep.  Go to bed at same time each night   Get up at same time each day - Set an alarm everyday (even weekends). This is one of    the most important tips. It prevents you from relying on your insomnia to get you    up on time for your day. That actually reinforces insomnia. It also will help your    body get into a pattern where you start feeling tired at a consistent time each    night.  The body functions best when you keep a consistent routine.  Avoid sleeping-in and napping. Anytime you sleep during the day, you will be less tired at    night. You may be tired enough to fall asleep, but you will wake more in the    middle of the night because you will have met your sleep need before the night is    done.   Cut down time in bed (if not asleep, get up)- Use your bed only for sleep and sex    Anytime you spend time in bed doing activities other than sleep (reading,    watching TV, working, playing on the computer or phone, or even just laying in    bed trying to sleep), you are training  your brain to think of the bed as a place to    do activities other than sleep. If you are not falling asleep within 20-30 minutes,    get out of bed. While out of bed, avoid bright lights. Avoid work or chores. Being    productive in the middle of the night reinforces waking up at night. Find relaxing,    not particularly entertaining activities like reading, listening to music, or relaxation    exercises. Go back to bed if you start feeling groggy, or after about 30 minutes,    even if not feeling very tired. Sometimes, just getting out of bed stops the pattern    of getting frustrated about  laying in bed not sleeping, and that can help you fall    asleep.   Avoid trying to force yourself to sleep- sleep is not like everything else. The harder you    work at most things, the more you can accomplish. The harder you work at    sleep, the less you will sleep.     Make the bedroom comfortable - quiet, dark and cool are better. Consider ear plugs    (silicon). Use dark blinds or wear an eye mask if needed     Make a relaxing routine prior to bedtime  Relaxation exercises:   Progressive muscle relaxation: Relax each muscle group individually    Begin with your feet, flex, then relax. Try to imagine your feet feeling heavy and sinking into the bed. Move to your calves, do the same thing. Work through each muscle group toward your head.    Relaxing Mental Imagery: Try to imagine a trip that you took and found relaxing, or imagine a day at the beach. Try to walk yourself through the day in your mind as if you were dreaming it. Try to imagine sensing the different experiences, such as feeling sand between your toes, the heat of the sun on your skin, seeing the waves crashing the shore, the smell of the salt water, etc.     Deal with your worries before bedtime    Set aside a worry time around dinner time for 10-15 minutes. Write down the    things that are on your mind. Plan time in the coming days to address those    issues. Brainstorm ideas on how you will deal with them. Try to identify issues    that are out of your control, and try to let those issues go.  Listen to relaxation tapes   Classical Music or Nature sounds   Back Massage   Get regular exercise each day (at least 1-2 hours before bedtime)   Take medications only as directed   Eat a light bedtime snack or warm drink   Warm milk   Warm herbal tea (non-caffeinated)       Things to avoid   No overstimulating activities just before bed   No competitive games before bedtime   No exciting television programs before bedtime   Avoid caffeine after lunchtime    Avoid chocolate   Do not use alcohol to induce sleep (worsens Insomnia)   Do not take someone else's sleeping pills   Do not look at the clock when awakening   Do not turn on light when getting up to use bathroom, use a nightlight     Online Programs   www.SHUTi.me (pronounced shut eye). There is a fee for this program. Enter the code  [facility]  if you decide to enroll in this program.    www.sleepIO.com (pronounced sleep ee oh). There is a fee for this program. Enter the code  [facility]  if you decide to enroll in this program.     Suggested Resources  Insomnia Treatment Books   Overcoming Insomnia by Austyn Marley and Molly Aguayo (2008)  No More Sleepless Nights by Liu Gutierrez and Rossy Obando (1996)  Say Chioma to Insomnia by Krunal Palma (2009)  The Insomnia Workbook by Karon Mae and Deep Oviedo (2009)  The Insomnia Answer by Art Smart and Garry Lima (2006)      Stress Management and Relaxation Books  The Relaxation and Stress Reduction Workbook by Luz Maria Ko, Purnima Esposito and Benjie Cooper (2008)  Stress Management Workbook: Techniques and Self-Assessment Procedures by Sofie Curry and Robert Castorena (1997)  A Mindfulness-Based Stress Reduction Workbook by Calvin Maxwell and Emilia Ellis (2010)  The Complete Stress Management Workbook by Cosme Sanchez, Caden Arevalo and Santos Ryan (1996)  Assert Yourself by Leann Pompa and Fei Pompa (1977)    Relaxation Resources for Computer Download   These websites offer resources to help you relax. This list is for information only. [facility] is not responsible for the quality of services or the actions of any person or organization.  Progressive Muscle Relaxation (PMR):   http://www.[employer]iac Therapeuticsstudio.com/progressive-muscle-relaxation-exercise.html   http://studentsupport.[facility]/counseling/resources/self-help/relaxation-and-stress-management/   Deep Breathing  Exercises:  http://www.[employer]/breathing-awareness.html     Meditation:   www.[employer]  www.theUB Accessguided-meditation-site.com You may have to pay for some of these resources.    Guided Imagery:  http://www.[employer]icer[employer]s/guided-imagery-scripts.html   http://[employer]/library/cuubjgbcdo-rhhhxo-oyovvdx/   Consider phone apps such as: Calm, Headspace or Insight Timer.    Counseling / Behavioral Health  [facility]  Visit www.[place].org or call [phone] to find a clinic close to you.  Or call [phone] for [facility].

## 2024-05-04 ENCOUNTER — MYC REFILL (OUTPATIENT)
Dept: INTERNAL MEDICINE | Facility: CLINIC | Age: 44
End: 2024-05-04
Payer: COMMERCIAL

## 2024-05-04 DIAGNOSIS — E03.9 HYPOTHYROIDISM, UNSPECIFIED TYPE: ICD-10-CM

## 2024-05-06 RX ORDER — LEVOTHYROXINE SODIUM 125 UG/1
250 TABLET ORAL
Qty: 60 TABLET | Refills: 1 | OUTPATIENT
Start: 2024-05-06

## 2024-05-07 ENCOUNTER — MYC MEDICAL ADVICE (OUTPATIENT)
Dept: OBGYN | Facility: CLINIC | Age: 44
End: 2024-05-07
Payer: COMMERCIAL

## 2024-05-08 ENCOUNTER — TELEPHONE (OUTPATIENT)
Dept: INTERNAL MEDICINE | Facility: CLINIC | Age: 44
End: 2024-05-08
Payer: COMMERCIAL

## 2024-05-08 NOTE — TELEPHONE ENCOUNTER
Home Care is calling regarding an established patient with [facility].       Requesting orders from: Brianda Richardson  Provider is following patient: Yes  Is this a 60-day recertification request?  No    Orders Requested    Physical Therapy  Request for continuation of care with decrease in frequency   Goals have been met/progressing. Strength, balance walking and pain.  Frequency:  1x/wk for 8 wks         Verbal orders given.  Home Care will send orders for provider to sign.  Confirmed ok to leave a detailed message with call back.  Contact information confirmed and updated as needed.    Lidia Blanchard RN

## 2024-05-10 ENCOUNTER — MEDICAL CORRESPONDENCE (OUTPATIENT)
Dept: HEALTH INFORMATION MANAGEMENT | Facility: CLINIC | Age: 44
End: 2024-05-10
Payer: COMMERCIAL

## 2024-05-20 ENCOUNTER — THERAPY VISIT (OUTPATIENT)
Dept: PHYSICAL THERAPY | Facility: CLINIC | Age: 44
End: 2024-05-20
Attending: INTERNAL MEDICINE
Payer: COMMERCIAL

## 2024-05-20 ENCOUNTER — TELEPHONE (OUTPATIENT)
Dept: OCCUPATIONAL THERAPY | Facility: CLINIC | Age: 44
End: 2024-05-20

## 2024-05-20 DIAGNOSIS — I89.0 LYMPHEDEMA ASSOCIATED WITH OBESITY: Primary | ICD-10-CM

## 2024-05-20 DIAGNOSIS — E66.9 LYMPHEDEMA ASSOCIATED WITH OBESITY: Primary | ICD-10-CM

## 2024-05-20 PROCEDURE — 97140 MANUAL THERAPY 1/> REGIONS: CPT | Mod: GP | Performed by: PHYSICAL THERAPIST

## 2024-05-20 PROCEDURE — 97535 SELF CARE MNGMENT TRAINING: CPT | Mod: GP | Performed by: PHYSICAL THERAPIST

## 2024-05-20 PROCEDURE — 97110 THERAPEUTIC EXERCISES: CPT | Mod: GP | Performed by: PHYSICAL THERAPIST

## 2024-06-20 ENCOUNTER — MEDICAL CORRESPONDENCE (OUTPATIENT)
Dept: HEALTH INFORMATION MANAGEMENT | Facility: CLINIC | Age: 44
End: 2024-06-20
Payer: COMMERCIAL

## 2024-06-21 DIAGNOSIS — Z53.9 DIAGNOSIS NOT YET DEFINED: Primary | ICD-10-CM

## 2024-06-21 PROCEDURE — G0179 MD RECERTIFICATION HHA PT: HCPCS | Performed by: INTERNAL MEDICINE

## 2024-06-27 ENCOUNTER — THERAPY VISIT (OUTPATIENT)
Dept: PHYSICAL THERAPY | Facility: CLINIC | Age: 44
End: 2024-06-27
Attending: INTERNAL MEDICINE
Payer: COMMERCIAL

## 2024-06-27 DIAGNOSIS — E66.9 LYMPHEDEMA ASSOCIATED WITH OBESITY: Primary | ICD-10-CM

## 2024-06-27 DIAGNOSIS — I89.0 LYMPHEDEMA ASSOCIATED WITH OBESITY: Primary | ICD-10-CM

## 2024-06-27 PROCEDURE — 97535 SELF CARE MNGMENT TRAINING: CPT | Mod: GP | Performed by: PHYSICAL THERAPIST

## 2024-06-27 PROCEDURE — 97140 MANUAL THERAPY 1/> REGIONS: CPT | Mod: GP | Performed by: PHYSICAL THERAPIST

## 2024-07-01 ENCOUNTER — TELEPHONE (OUTPATIENT)
Dept: PHYSICAL THERAPY | Facility: CLINIC | Age: 44
End: 2024-07-01
Payer: COMMERCIAL

## 2024-07-19 ENCOUNTER — THERAPY VISIT (OUTPATIENT)
Dept: PHYSICAL THERAPY | Facility: CLINIC | Age: 44
End: 2024-07-19
Attending: INTERNAL MEDICINE
Payer: COMMERCIAL

## 2024-07-19 DIAGNOSIS — I89.0 LYMPHEDEMA ASSOCIATED WITH OBESITY: Primary | ICD-10-CM

## 2024-07-19 DIAGNOSIS — E66.9 LYMPHEDEMA ASSOCIATED WITH OBESITY: Primary | ICD-10-CM

## 2024-07-19 PROCEDURE — 97110 THERAPEUTIC EXERCISES: CPT | Mod: GP | Performed by: PHYSICAL THERAPIST

## 2024-07-19 PROCEDURE — 97535 SELF CARE MNGMENT TRAINING: CPT | Mod: GP | Performed by: PHYSICAL THERAPIST

## 2024-07-22 ENCOUNTER — THERAPY VISIT (OUTPATIENT)
Dept: PHYSICAL THERAPY | Facility: CLINIC | Age: 44
End: 2024-07-22
Attending: INTERNAL MEDICINE
Payer: COMMERCIAL

## 2024-07-22 DIAGNOSIS — E66.9 LYMPHEDEMA ASSOCIATED WITH OBESITY: Primary | ICD-10-CM

## 2024-07-22 DIAGNOSIS — I89.0 LYMPHEDEMA ASSOCIATED WITH OBESITY: Primary | ICD-10-CM

## 2024-07-22 PROCEDURE — 97535 SELF CARE MNGMENT TRAINING: CPT | Mod: GP | Performed by: PHYSICAL THERAPIST

## 2024-07-25 ENCOUNTER — MEDICAL CORRESPONDENCE (OUTPATIENT)
Dept: HEALTH INFORMATION MANAGEMENT | Facility: CLINIC | Age: 44
End: 2024-07-25
Payer: COMMERCIAL

## 2024-07-25 DIAGNOSIS — Z53.9 DIAGNOSIS NOT YET DEFINED: Primary | ICD-10-CM

## 2024-07-25 PROCEDURE — G0180 MD CERTIFICATION HHA PATIENT: HCPCS | Performed by: INTERNAL MEDICINE

## 2024-08-02 ENCOUNTER — LAB (OUTPATIENT)
Dept: LAB | Facility: CLINIC | Age: 44
End: 2024-08-02
Payer: COMMERCIAL

## 2024-08-02 DIAGNOSIS — E03.9 ACQUIRED HYPOTHYROIDISM: ICD-10-CM

## 2024-08-02 LAB
T4 FREE SERPL-MCNC: 1.13 NG/DL (ref 0.9–1.7)
TSH SERPL DL<=0.005 MIU/L-ACNC: 16.9 UIU/ML (ref 0.3–4.2)

## 2024-08-02 PROCEDURE — 36415 COLL VENOUS BLD VENIPUNCTURE: CPT

## 2024-08-02 PROCEDURE — 84443 ASSAY THYROID STIM HORMONE: CPT

## 2024-08-02 PROCEDURE — 84439 ASSAY OF FREE THYROXINE: CPT

## 2024-08-03 ENCOUNTER — MYC REFILL (OUTPATIENT)
Dept: INTERNAL MEDICINE | Facility: CLINIC | Age: 44
End: 2024-08-03
Payer: COMMERCIAL

## 2024-08-03 DIAGNOSIS — E03.9 HYPOTHYROIDISM, UNSPECIFIED TYPE: ICD-10-CM

## 2024-08-05 ENCOUNTER — MYC MEDICAL ADVICE (OUTPATIENT)
Dept: INTERNAL MEDICINE | Facility: CLINIC | Age: 44
End: 2024-08-05
Payer: COMMERCIAL

## 2024-08-06 RX ORDER — LEVOTHYROXINE SODIUM 125 UG/1
250 TABLET ORAL
Qty: 60 TABLET | Refills: 1 | Status: SHIPPED | OUTPATIENT
Start: 2024-08-06

## 2024-08-12 ENCOUNTER — THERAPY VISIT (OUTPATIENT)
Dept: PHYSICAL THERAPY | Facility: CLINIC | Age: 44
End: 2024-08-12
Attending: INTERNAL MEDICINE
Payer: COMMERCIAL

## 2024-08-12 DIAGNOSIS — I89.0 LYMPHEDEMA ASSOCIATED WITH OBESITY: Primary | ICD-10-CM

## 2024-08-12 DIAGNOSIS — E66.9 LYMPHEDEMA ASSOCIATED WITH OBESITY: Primary | ICD-10-CM

## 2024-08-12 PROCEDURE — 97535 SELF CARE MNGMENT TRAINING: CPT | Mod: GP | Performed by: PHYSICAL THERAPIST

## 2024-08-16 ENCOUNTER — MEDICAL CORRESPONDENCE (OUTPATIENT)
Dept: HEALTH INFORMATION MANAGEMENT | Facility: CLINIC | Age: 44
End: 2024-08-16
Payer: COMMERCIAL

## 2024-08-28 NOTE — NURSING NOTE
BRIAN order faxed to [facility]. My Chart message sent to patient:  Yuri Monsivais,    This is a quick message following up on your 5/3/2024 appointment with Bennett Goltz, PA-C.  It looks like Bennett Goltz, PA-C ordered a sleep study and a follow-up appointment with him to go over the results of sleep study.     If this is something you would like to move forward in scheduling, please call us at [phone].  Thank you!    [facility]  Marilynn Ortega, JOE

## 2024-09-11 ENCOUNTER — DOCUMENTATION ONLY (OUTPATIENT)
Dept: SLEEP MEDICINE | Facility: CLINIC | Age: 44
End: 2024-09-11
Payer: COMMERCIAL

## 2024-09-11 NOTE — PROGRESS NOTES
BRIAN RX SIGNED BY: BENNETT GOLTZ  DATE: 5/3/24  STUDY PERFORMED ON (PAP/O2/RA): PAP AND O2  DOWNLOAD METHOD (BREATHE/NONIN): NONIN   PROVIDER FAX:    DID YOU DOCUMENT CONTACT ATTEMPTS IN [facility]? YES  AFTER FIRST ATTEMPT PLEASE VERIFY PHONE NUMBER IN [employer] MATCHES PHONE NUMBER IN EPIC.    8/28 GF - REC PPWK AND PLACED INTO OXYGEN BATCH  8/28/2024 EMANUEL- LVM TO BECKY BACK [phone] TO SCHEDULE PICKUP  9/3/2024 - NP - LVM ASKING HER TO CALL BACK TO LET US KNOW WHERE SHE WOULD LIKE TO PU THE BRIAN FROM.  9/6-LP-CALLED AND LVM  9/11 AV- CALLED AN LVM FOR PT, WE HAVE MADE 4 ATTEMPTS TO CONTACT PT. SENT MESSAGE TO [facility] PROVIDER AND CLOSING NOTE

## 2024-10-01 ENCOUNTER — MYC MEDICAL ADVICE (OUTPATIENT)
Dept: SLEEP MEDICINE | Facility: CLINIC | Age: 44
End: 2024-10-01
Payer: COMMERCIAL

## 2024-10-07 ENCOUNTER — DOCUMENTATION ONLY (OUTPATIENT)
Dept: INTERNAL MEDICINE | Facility: CLINIC | Age: 44
End: 2024-10-07
Payer: COMMERCIAL

## 2024-10-07 DIAGNOSIS — E03.9 HYPOTHYROIDISM, UNSPECIFIED TYPE: Primary | ICD-10-CM

## 2024-10-07 NOTE — PROGRESS NOTES
Yodit Harvey has an upcoming lab appointment:    Future Appointments   Date Time Provider Department Center   10/16/2024 10:45 AM [facility]     Patient is scheduled for the following lab(s):   Scheduling and Arrival Information   Scheduling Notes: Blood work for thyroid levels   Made On: 10/7/2024 12:00 PM By: KATELYN BARNEY       There is no order available. Please review and place either future orders or HMPO (Review of Health Maintenance Protocol Orders), as appropriate.    There are no preventive care reminders to display for this patient.    If no labs are needed at this time please have the Care Team contact patient regarding this information and cancel lab appointment. Thank you.     Adriana VALDES

## 2024-10-16 ENCOUNTER — LAB (OUTPATIENT)
Dept: LAB | Facility: CLINIC | Age: 44
End: 2024-10-16
Payer: COMMERCIAL

## 2024-10-16 DIAGNOSIS — E03.9 HYPOTHYROIDISM, UNSPECIFIED TYPE: ICD-10-CM

## 2024-10-16 LAB
T4 FREE SERPL-MCNC: 1.21 NG/DL (ref 0.9–1.7)
TSH SERPL DL<=0.005 MIU/L-ACNC: 9.28 UIU/ML (ref 0.3–4.2)

## 2024-10-16 PROCEDURE — 84439 ASSAY OF FREE THYROXINE: CPT

## 2024-10-16 PROCEDURE — 84443 ASSAY THYROID STIM HORMONE: CPT

## 2024-10-16 PROCEDURE — 36415 COLL VENOUS BLD VENIPUNCTURE: CPT

## 2024-10-18 ENCOUNTER — MYC REFILL (OUTPATIENT)
Dept: INTERNAL MEDICINE | Facility: CLINIC | Age: 44
End: 2024-10-18
Payer: COMMERCIAL

## 2024-10-18 DIAGNOSIS — E03.9 HYPOTHYROIDISM, UNSPECIFIED TYPE: ICD-10-CM

## 2024-10-18 RX ORDER — LEVOTHYROXINE SODIUM 125 UG/1
250 TABLET ORAL
Qty: 60 TABLET | Refills: 1 | Status: SHIPPED | OUTPATIENT
Start: 2024-10-18

## 2024-11-06 ENCOUNTER — MEDICAL CORRESPONDENCE (OUTPATIENT)
Dept: HEALTH INFORMATION MANAGEMENT | Facility: CLINIC | Age: 44
End: 2024-11-06
Payer: COMMERCIAL

## 2024-12-22 ENCOUNTER — MYC MEDICAL ADVICE (OUTPATIENT)
Dept: INTERNAL MEDICINE | Facility: CLINIC | Age: 44
End: 2024-12-22
Payer: COMMERCIAL

## 2024-12-22 DIAGNOSIS — E03.9 HYPOTHYROIDISM, UNSPECIFIED TYPE: ICD-10-CM

## 2024-12-23 NOTE — TELEPHONE ENCOUNTER
TSH   Date Value Ref Range Status   10/16/2024 9.28 (H) 0.30 - 4.20 uIU/mL Final   11/07/2019 128.85 (H) 0.40 - 4.00 mU/L Final

## 2024-12-24 RX ORDER — LEVOTHYROXINE SODIUM 125 UG/1
250 TABLET ORAL
Qty: 90 TABLET | Refills: 1 | Status: SHIPPED | OUTPATIENT
Start: 2024-12-24

## 2025-01-03 ENCOUNTER — DOCUMENTATION ONLY (OUTPATIENT)
Dept: SLEEP MEDICINE | Facility: CLINIC | Age: 45
End: 2025-01-03
Payer: COMMERCIAL

## 2025-01-03 DIAGNOSIS — J96.11 CHRONIC RESPIRATORY FAILURE WITH HYPOXIA (H): Primary | ICD-10-CM

## 2025-01-07 NOTE — TELEPHONE ENCOUNTER
----- Message from Alfonso MARTINES sent at 1/6/2025  3:09 PM CST -----  Please sign pended oxygen Rx for annual renewal. No follow up appt needed.

## 2025-02-18 ENCOUNTER — PATIENT OUTREACH (OUTPATIENT)
Dept: CARE COORDINATION | Facility: CLINIC | Age: 45
End: 2025-02-18
Payer: COMMERCIAL

## 2025-03-04 ENCOUNTER — PATIENT OUTREACH (OUTPATIENT)
Dept: CARE COORDINATION | Facility: CLINIC | Age: 45
End: 2025-03-04
Payer: COMMERCIAL

## 2025-05-03 ENCOUNTER — HEALTH MAINTENANCE LETTER (OUTPATIENT)
Age: 45
End: 2025-05-03

## 2025-05-12 ENCOUNTER — LAB (OUTPATIENT)
Dept: LAB | Facility: CLINIC | Age: 45
End: 2025-05-12
Payer: COMMERCIAL

## 2025-05-12 DIAGNOSIS — E03.9 HYPOTHYROIDISM, UNSPECIFIED TYPE: ICD-10-CM

## 2025-05-12 PROCEDURE — 84443 ASSAY THYROID STIM HORMONE: CPT

## 2025-05-12 PROCEDURE — 84439 ASSAY OF FREE THYROXINE: CPT

## 2025-05-12 PROCEDURE — 36415 COLL VENOUS BLD VENIPUNCTURE: CPT

## 2025-05-13 LAB
T4 FREE SERPL-MCNC: 1.11 NG/DL (ref 0.9–1.7)
TSH SERPL DL<=0.005 MIU/L-ACNC: 5.97 UIU/ML (ref 0.3–4.2)

## 2025-05-14 ENCOUNTER — RESULTS FOLLOW-UP (OUTPATIENT)
Dept: INTERNAL MEDICINE | Facility: CLINIC | Age: 45
End: 2025-05-14
Payer: COMMERCIAL

## 2025-05-14 DIAGNOSIS — E03.9 HYPOTHYROIDISM, UNSPECIFIED TYPE: ICD-10-CM

## 2025-05-15 RX ORDER — LEVOTHYROXINE SODIUM 125 UG/1
250 TABLET ORAL
Qty: 90 TABLET | Refills: 1 | Status: SHIPPED | OUTPATIENT
Start: 2025-05-15

## 2025-08-26 ENCOUNTER — MYC REFILL (OUTPATIENT)
Dept: INTERNAL MEDICINE | Facility: CLINIC | Age: 45
End: 2025-08-26
Payer: COMMERCIAL

## 2025-08-26 DIAGNOSIS — E03.9 HYPOTHYROIDISM, UNSPECIFIED TYPE: ICD-10-CM

## 2025-08-26 DIAGNOSIS — J30.81 ALLERGIC RHINITIS DUE TO ANIMALS: ICD-10-CM

## 2025-08-27 RX ORDER — CETIRIZINE HYDROCHLORIDE 10 MG/1
10 TABLET ORAL DAILY
Qty: 90 TABLET | Refills: 3 | OUTPATIENT
Start: 2025-08-27

## 2025-08-27 RX ORDER — LEVOTHYROXINE SODIUM 125 UG/1
250 TABLET ORAL
Qty: 90 TABLET | Refills: 1 | OUTPATIENT
Start: 2025-08-27

## 2025-08-28 ENCOUNTER — VIRTUAL VISIT (OUTPATIENT)
Dept: INTERNAL MEDICINE | Facility: CLINIC | Age: 45
End: 2025-08-28
Payer: COMMERCIAL

## 2025-08-28 DIAGNOSIS — J30.81 ALLERGIC RHINITIS DUE TO ANIMALS: ICD-10-CM

## 2025-08-28 DIAGNOSIS — Z12.11 SCREEN FOR COLON CANCER: Primary | ICD-10-CM

## 2025-08-28 DIAGNOSIS — E03.9 HYPOTHYROIDISM, UNSPECIFIED TYPE: ICD-10-CM

## 2025-08-28 PROBLEM — H65.90 SEROUS OTITIS MEDIA: Status: ACTIVE | Noted: 2025-08-28

## 2025-08-28 RX ORDER — CETIRIZINE HYDROCHLORIDE 10 MG/1
10 TABLET ORAL DAILY
Qty: 90 TABLET | Refills: 3 | Status: CANCELLED | OUTPATIENT
Start: 2025-08-28

## 2025-08-28 RX ORDER — LEVOTHYROXINE SODIUM 125 UG/1
250 TABLET ORAL
Qty: 60 TABLET | Refills: 0 | Status: SHIPPED | OUTPATIENT
Start: 2025-08-28

## (undated) RX ORDER — LIDOCAINE HYDROCHLORIDE 10 MG/ML
INJECTION, SOLUTION INFILTRATION; PERINEURAL
Status: DISPENSED
Start: 2024-01-30